# Patient Record
Sex: MALE | Race: WHITE | Employment: OTHER | URBAN - NONMETROPOLITAN AREA
[De-identification: names, ages, dates, MRNs, and addresses within clinical notes are randomized per-mention and may not be internally consistent; named-entity substitution may affect disease eponyms.]

---

## 2017-01-09 ENCOUNTER — OFFICE VISIT (OUTPATIENT)
Dept: FAMILY MEDICINE CLINIC | Age: 67
End: 2017-01-09

## 2017-01-09 VITALS
TEMPERATURE: 98.7 F | HEART RATE: 88 BPM | BODY MASS INDEX: 26.76 KG/M2 | HEIGHT: 72 IN | SYSTOLIC BLOOD PRESSURE: 132 MMHG | WEIGHT: 197.6 LBS | DIASTOLIC BLOOD PRESSURE: 72 MMHG

## 2017-01-09 DIAGNOSIS — J18.9 PNEUMONIA OF LEFT LOWER LOBE DUE TO INFECTIOUS ORGANISM: Primary | ICD-10-CM

## 2017-01-09 PROCEDURE — 99213 OFFICE O/P EST LOW 20 MIN: CPT | Performed by: FAMILY MEDICINE

## 2017-01-09 RX ORDER — CEFDINIR 300 MG/1
300 CAPSULE ORAL 2 TIMES DAILY
Qty: 20 CAPSULE | Refills: 0 | Status: SHIPPED | OUTPATIENT
Start: 2017-01-09 | End: 2017-01-19

## 2017-01-23 ENCOUNTER — OFFICE VISIT (OUTPATIENT)
Dept: FAMILY MEDICINE CLINIC | Age: 67
End: 2017-01-23

## 2017-01-23 VITALS
WEIGHT: 197.4 LBS | SYSTOLIC BLOOD PRESSURE: 128 MMHG | BODY MASS INDEX: 26.74 KG/M2 | TEMPERATURE: 97.9 F | DIASTOLIC BLOOD PRESSURE: 70 MMHG | HEART RATE: 88 BPM | HEIGHT: 72 IN

## 2017-01-23 DIAGNOSIS — J01.90 ACUTE NON-RECURRENT SINUSITIS, UNSPECIFIED LOCATION: Primary | ICD-10-CM

## 2017-01-23 PROCEDURE — 4004F PT TOBACCO SCREEN RCVD TLK: CPT | Performed by: FAMILY MEDICINE

## 2017-01-23 PROCEDURE — G8484 FLU IMMUNIZE NO ADMIN: HCPCS | Performed by: FAMILY MEDICINE

## 2017-01-23 PROCEDURE — 99212 OFFICE O/P EST SF 10 MIN: CPT | Performed by: FAMILY MEDICINE

## 2017-01-23 PROCEDURE — 1123F ACP DISCUSS/DSCN MKR DOCD: CPT | Performed by: FAMILY MEDICINE

## 2017-01-23 PROCEDURE — 3017F COLORECTAL CA SCREEN DOC REV: CPT | Performed by: FAMILY MEDICINE

## 2017-01-23 PROCEDURE — 4040F PNEUMOC VAC/ADMIN/RCVD: CPT | Performed by: FAMILY MEDICINE

## 2017-01-23 PROCEDURE — G8427 DOCREV CUR MEDS BY ELIG CLIN: HCPCS | Performed by: FAMILY MEDICINE

## 2017-01-23 PROCEDURE — G8420 CALC BMI NORM PARAMETERS: HCPCS | Performed by: FAMILY MEDICINE

## 2017-01-23 RX ORDER — AMOXICILLIN AND CLAVULANATE POTASSIUM 500; 125 MG/1; MG/1
1 TABLET, FILM COATED ORAL 2 TIMES DAILY
Qty: 20 TABLET | Refills: 0 | Status: SHIPPED | OUTPATIENT
Start: 2017-01-23 | End: 2017-02-02

## 2017-01-23 RX ORDER — PREDNISONE 10 MG/1
10 TABLET ORAL 2 TIMES DAILY
Qty: 14 TABLET | Refills: 0 | Status: SHIPPED | OUTPATIENT
Start: 2017-01-23 | End: 2017-03-06 | Stop reason: ALTCHOICE

## 2017-02-02 ENCOUNTER — TELEPHONE (OUTPATIENT)
Dept: FAMILY MEDICINE CLINIC | Age: 67
End: 2017-02-02

## 2017-02-02 DIAGNOSIS — J20.9 ACUTE BRONCHITIS, UNSPECIFIED ORGANISM: Primary | ICD-10-CM

## 2017-02-06 ENCOUNTER — TELEPHONE (OUTPATIENT)
Dept: FAMILY MEDICINE CLINIC | Age: 67
End: 2017-02-06

## 2017-02-13 ENCOUNTER — TELEPHONE (OUTPATIENT)
Dept: FAMILY MEDICINE CLINIC | Age: 67
End: 2017-02-13

## 2017-02-13 RX ORDER — LEVOFLOXACIN 500 MG/1
500 TABLET, FILM COATED ORAL DAILY
Qty: 7 TABLET | Refills: 0 | Status: SHIPPED | OUTPATIENT
Start: 2017-02-13 | End: 2017-02-20

## 2017-02-13 RX ORDER — PROMETHAZINE HYDROCHLORIDE AND CODEINE PHOSPHATE 6.25; 1 MG/5ML; MG/5ML
SYRUP ORAL
Qty: 120 ML | Refills: 1 | Status: SHIPPED | OUTPATIENT
Start: 2017-02-13 | End: 2017-03-06 | Stop reason: ALTCHOICE

## 2017-03-06 ENCOUNTER — OFFICE VISIT (OUTPATIENT)
Dept: FAMILY MEDICINE CLINIC | Age: 67
End: 2017-03-06

## 2017-03-06 VITALS
WEIGHT: 200.2 LBS | SYSTOLIC BLOOD PRESSURE: 122 MMHG | TEMPERATURE: 97.8 F | HEART RATE: 88 BPM | DIASTOLIC BLOOD PRESSURE: 72 MMHG | BODY MASS INDEX: 27.15 KG/M2

## 2017-03-06 DIAGNOSIS — J01.91 ACUTE RECURRENT SINUSITIS, UNSPECIFIED LOCATION: Primary | ICD-10-CM

## 2017-03-06 PROCEDURE — 99213 OFFICE O/P EST LOW 20 MIN: CPT | Performed by: FAMILY MEDICINE

## 2017-03-06 PROCEDURE — G8484 FLU IMMUNIZE NO ADMIN: HCPCS | Performed by: FAMILY MEDICINE

## 2017-03-06 PROCEDURE — G8420 CALC BMI NORM PARAMETERS: HCPCS | Performed by: FAMILY MEDICINE

## 2017-03-06 PROCEDURE — 1123F ACP DISCUSS/DSCN MKR DOCD: CPT | Performed by: FAMILY MEDICINE

## 2017-03-06 PROCEDURE — 1036F TOBACCO NON-USER: CPT | Performed by: FAMILY MEDICINE

## 2017-03-06 PROCEDURE — 3017F COLORECTAL CA SCREEN DOC REV: CPT | Performed by: FAMILY MEDICINE

## 2017-03-06 PROCEDURE — G8427 DOCREV CUR MEDS BY ELIG CLIN: HCPCS | Performed by: FAMILY MEDICINE

## 2017-03-06 PROCEDURE — 4040F PNEUMOC VAC/ADMIN/RCVD: CPT | Performed by: FAMILY MEDICINE

## 2017-03-06 RX ORDER — TRIAMCINOLONE ACETONIDE 55 UG/1
2 SPRAY, METERED NASAL DAILY
Qty: 1 INHALER | Refills: 3 | Status: SHIPPED | OUTPATIENT
Start: 2017-03-06 | End: 2019-05-21

## 2017-03-06 RX ORDER — SELENIUM SULFIDE 2.5 MG/100ML
LOTION TOPICAL
Qty: 1 BOTTLE | Refills: 2 | Status: SHIPPED | OUTPATIENT
Start: 2017-03-06 | End: 2017-04-05

## 2017-05-23 ENCOUNTER — OFFICE VISIT (OUTPATIENT)
Dept: FAMILY MEDICINE CLINIC | Age: 67
End: 2017-05-23

## 2017-05-23 VITALS
HEIGHT: 72 IN | DIASTOLIC BLOOD PRESSURE: 82 MMHG | BODY MASS INDEX: 27.74 KG/M2 | SYSTOLIC BLOOD PRESSURE: 144 MMHG | HEART RATE: 88 BPM | WEIGHT: 204.8 LBS

## 2017-05-23 DIAGNOSIS — I10 ESSENTIAL HYPERTENSION: Primary | ICD-10-CM

## 2017-05-23 DIAGNOSIS — Z23 NEED FOR PROPHYLACTIC VACCINATION AGAINST STREPTOCOCCUS PNEUMONIAE (PNEUMOCOCCUS): ICD-10-CM

## 2017-05-23 DIAGNOSIS — N52.31 ERECTILE DYSFUNCTION FOLLOWING RADICAL PROSTATECTOMY: ICD-10-CM

## 2017-05-23 PROCEDURE — 90670 PCV13 VACCINE IM: CPT | Performed by: FAMILY MEDICINE

## 2017-05-23 PROCEDURE — 99213 OFFICE O/P EST LOW 20 MIN: CPT | Performed by: FAMILY MEDICINE

## 2017-05-23 PROCEDURE — 1123F ACP DISCUSS/DSCN MKR DOCD: CPT | Performed by: FAMILY MEDICINE

## 2017-05-23 PROCEDURE — G8420 CALC BMI NORM PARAMETERS: HCPCS | Performed by: FAMILY MEDICINE

## 2017-05-23 PROCEDURE — G0009 ADMIN PNEUMOCOCCAL VACCINE: HCPCS | Performed by: FAMILY MEDICINE

## 2017-05-23 PROCEDURE — 1036F TOBACCO NON-USER: CPT | Performed by: FAMILY MEDICINE

## 2017-05-23 PROCEDURE — 3017F COLORECTAL CA SCREEN DOC REV: CPT | Performed by: FAMILY MEDICINE

## 2017-05-23 PROCEDURE — 4040F PNEUMOC VAC/ADMIN/RCVD: CPT | Performed by: FAMILY MEDICINE

## 2017-05-23 PROCEDURE — G8427 DOCREV CUR MEDS BY ELIG CLIN: HCPCS | Performed by: FAMILY MEDICINE

## 2017-05-23 RX ORDER — LORAZEPAM 1 MG/1
1 TABLET ORAL EVERY 8 HOURS PRN
Qty: 45 TABLET | Refills: 1 | Status: SHIPPED | OUTPATIENT
Start: 2017-05-23 | End: 2017-11-21 | Stop reason: SDUPTHER

## 2017-05-23 RX ORDER — SIMVASTATIN 10 MG
10 TABLET ORAL NIGHTLY
Qty: 90 TABLET | Refills: 1 | Status: SHIPPED | OUTPATIENT
Start: 2017-05-23 | End: 2017-11-21 | Stop reason: SDUPTHER

## 2017-05-23 RX ORDER — TADALAFIL 20 MG
20 TABLET ORAL PRN
Qty: 5 TABLET | Refills: 3 | Status: SHIPPED | OUTPATIENT
Start: 2017-05-23 | End: 2017-11-21 | Stop reason: SDUPTHER

## 2017-05-23 RX ORDER — LISINOPRIL AND HYDROCHLOROTHIAZIDE 25; 20 MG/1; MG/1
1 TABLET ORAL DAILY
Qty: 90 TABLET | Refills: 1 | Status: SHIPPED | OUTPATIENT
Start: 2017-05-23 | End: 2017-11-21 | Stop reason: SDUPTHER

## 2017-05-23 ASSESSMENT — ENCOUNTER SYMPTOMS
RESPIRATORY NEGATIVE: 1
GASTROINTESTINAL NEGATIVE: 1
ORTHOPNEA: 0
SHORTNESS OF BREATH: 0

## 2017-11-14 ENCOUNTER — TELEPHONE (OUTPATIENT)
Dept: FAMILY MEDICINE CLINIC | Age: 67
End: 2017-11-14

## 2017-11-14 DIAGNOSIS — I10 ESSENTIAL HYPERTENSION: Primary | ICD-10-CM

## 2017-11-14 DIAGNOSIS — C61 PROSTATE CANCER (HCC): ICD-10-CM

## 2017-11-14 DIAGNOSIS — E78.00 HYPERCHOLESTEROLEMIA: ICD-10-CM

## 2017-11-15 ENCOUNTER — TELEPHONE (OUTPATIENT)
Dept: FAMILY MEDICINE CLINIC | Age: 67
End: 2017-11-15

## 2017-11-15 ENCOUNTER — NURSE ONLY (OUTPATIENT)
Dept: FAMILY MEDICINE CLINIC | Age: 67
End: 2017-11-15
Payer: MEDICARE

## 2017-11-15 DIAGNOSIS — I10 ESSENTIAL HYPERTENSION: ICD-10-CM

## 2017-11-15 DIAGNOSIS — C61 PROSTATE CANCER (HCC): ICD-10-CM

## 2017-11-15 DIAGNOSIS — E78.00 HYPERCHOLESTEROLEMIA: ICD-10-CM

## 2017-11-15 LAB
ALBUMIN SERPL-MCNC: 4.1 G/DL (ref 3.5–5.1)
ALP BLD-CCNC: 48 U/L (ref 38–126)
ALT SERPL-CCNC: 33 U/L (ref 11–66)
ANION GAP SERPL CALCULATED.3IONS-SCNC: 9 MEQ/L (ref 8–16)
AST SERPL-CCNC: 26 U/L (ref 5–40)
BILIRUB SERPL-MCNC: 0.4 MG/DL (ref 0.3–1.2)
BUN BLDV-MCNC: 23 MG/DL (ref 7–22)
CALCIUM SERPL-MCNC: 8.7 MG/DL (ref 8.5–10.5)
CHLORIDE BLD-SCNC: 104 MEQ/L (ref 98–111)
CHOLESTEROL, TOTAL: 152 MG/DL (ref 100–199)
CO2: 27 MEQ/L (ref 23–33)
CREAT SERPL-MCNC: 0.9 MG/DL (ref 0.4–1.2)
GFR SERPL CREATININE-BSD FRML MDRD: 84 ML/MIN/1.73M2
GLUCOSE BLD-MCNC: 111 MG/DL (ref 70–108)
HDLC SERPL-MCNC: 46 MG/DL
LDL CHOLESTEROL CALCULATED: 77 MG/DL
POTASSIUM SERPL-SCNC: 4.3 MEQ/L (ref 3.5–5.2)
PROSTATE SPECIFIC ANTIGEN: < 0.02 NG/ML (ref 0–1)
SODIUM BLD-SCNC: 140 MEQ/L (ref 135–145)
TOTAL PROTEIN: 6.9 G/DL (ref 6.1–8)
TRIGL SERPL-MCNC: 145 MG/DL (ref 0–199)

## 2017-11-15 PROCEDURE — 36415 COLL VENOUS BLD VENIPUNCTURE: CPT | Performed by: FAMILY MEDICINE

## 2017-11-15 NOTE — PROGRESS NOTES
Venipuncture obtained from left Arm. Patient tolerated the procedure without complications or complaints.

## 2017-11-21 ENCOUNTER — OFFICE VISIT (OUTPATIENT)
Dept: FAMILY MEDICINE CLINIC | Age: 67
End: 2017-11-21
Payer: MEDICARE

## 2017-11-21 VITALS
SYSTOLIC BLOOD PRESSURE: 130 MMHG | BODY MASS INDEX: 28.47 KG/M2 | DIASTOLIC BLOOD PRESSURE: 78 MMHG | HEIGHT: 72 IN | HEART RATE: 88 BPM | WEIGHT: 210.2 LBS

## 2017-11-21 DIAGNOSIS — Z23 NEED FOR PROPHYLACTIC VACCINATION AND INOCULATION AGAINST INFLUENZA: ICD-10-CM

## 2017-11-21 DIAGNOSIS — J01.91 ACUTE RECURRENT SINUSITIS, UNSPECIFIED LOCATION: ICD-10-CM

## 2017-11-21 DIAGNOSIS — N52.31 ERECTILE DYSFUNCTION FOLLOWING RADICAL PROSTATECTOMY: ICD-10-CM

## 2017-11-21 DIAGNOSIS — I10 ESSENTIAL HYPERTENSION: Primary | ICD-10-CM

## 2017-11-21 PROCEDURE — G8510 SCR DEP NEG, NO PLAN REQD: HCPCS | Performed by: FAMILY MEDICINE

## 2017-11-21 PROCEDURE — G0008 ADMIN INFLUENZA VIRUS VAC: HCPCS | Performed by: FAMILY MEDICINE

## 2017-11-21 PROCEDURE — 90688 IIV4 VACCINE SPLT 0.5 ML IM: CPT | Performed by: FAMILY MEDICINE

## 2017-11-21 PROCEDURE — 3017F COLORECTAL CA SCREEN DOC REV: CPT | Performed by: FAMILY MEDICINE

## 2017-11-21 PROCEDURE — 3288F FALL RISK ASSESSMENT DOCD: CPT | Performed by: FAMILY MEDICINE

## 2017-11-21 PROCEDURE — 99213 OFFICE O/P EST LOW 20 MIN: CPT | Performed by: FAMILY MEDICINE

## 2017-11-21 PROCEDURE — 4040F PNEUMOC VAC/ADMIN/RCVD: CPT | Performed by: FAMILY MEDICINE

## 2017-11-21 PROCEDURE — 1036F TOBACCO NON-USER: CPT | Performed by: FAMILY MEDICINE

## 2017-11-21 PROCEDURE — 1123F ACP DISCUSS/DSCN MKR DOCD: CPT | Performed by: FAMILY MEDICINE

## 2017-11-21 PROCEDURE — G8484 FLU IMMUNIZE NO ADMIN: HCPCS | Performed by: FAMILY MEDICINE

## 2017-11-21 PROCEDURE — G8417 CALC BMI ABV UP PARAM F/U: HCPCS | Performed by: FAMILY MEDICINE

## 2017-11-21 PROCEDURE — G8427 DOCREV CUR MEDS BY ELIG CLIN: HCPCS | Performed by: FAMILY MEDICINE

## 2017-11-21 RX ORDER — SIMVASTATIN 10 MG
10 TABLET ORAL NIGHTLY
Qty: 90 TABLET | Refills: 1 | Status: SHIPPED | OUTPATIENT
Start: 2017-11-21 | End: 2018-05-22 | Stop reason: SDUPTHER

## 2017-11-21 RX ORDER — FLUTICASONE PROPIONATE 110 UG/1
2 AEROSOL, METERED RESPIRATORY (INHALATION) 2 TIMES DAILY
Qty: 1 INHALER | Refills: 0 | Status: SHIPPED | OUTPATIENT
Start: 2017-11-21 | End: 2018-05-22

## 2017-11-21 RX ORDER — VALACYCLOVIR HYDROCHLORIDE 500 MG/1
500 TABLET, FILM COATED ORAL 3 TIMES DAILY
Qty: 21 TABLET | Refills: 1 | Status: SHIPPED | OUTPATIENT
Start: 2017-11-21 | End: 2018-05-22 | Stop reason: SDUPTHER

## 2017-11-21 RX ORDER — TADALAFIL 20 MG
20 TABLET ORAL PRN
Qty: 5 TABLET | Refills: 3 | Status: SHIPPED | OUTPATIENT
Start: 2017-11-21 | End: 2019-05-21 | Stop reason: ALTCHOICE

## 2017-11-21 RX ORDER — LORAZEPAM 1 MG/1
1 TABLET ORAL EVERY 8 HOURS PRN
Qty: 45 TABLET | Refills: 1 | Status: SHIPPED | OUTPATIENT
Start: 2017-11-21 | End: 2018-05-22 | Stop reason: SDUPTHER

## 2017-11-21 RX ORDER — LISINOPRIL AND HYDROCHLOROTHIAZIDE 25; 20 MG/1; MG/1
1 TABLET ORAL DAILY
Qty: 90 TABLET | Refills: 1 | Status: SHIPPED | OUTPATIENT
Start: 2017-11-21 | End: 2018-05-22 | Stop reason: SDUPTHER

## 2017-11-21 ASSESSMENT — PATIENT HEALTH QUESTIONNAIRE - PHQ9
SUM OF ALL RESPONSES TO PHQ QUESTIONS 1-9: 0
SUM OF ALL RESPONSES TO PHQ9 QUESTIONS 1 & 2: 0
1. LITTLE INTEREST OR PLEASURE IN DOING THINGS: 0
2. FEELING DOWN, DEPRESSED OR HOPELESS: 0

## 2017-11-21 ASSESSMENT — ENCOUNTER SYMPTOMS
GASTROINTESTINAL NEGATIVE: 1
SHORTNESS OF BREATH: 0
CHOKING: 0
COUGH: 1
ORTHOPNEA: 0
WHEEZING: 1

## 2017-11-21 NOTE — PROGRESS NOTES
[facility] PROFESSIONAL SERVS  [facility]  [address] [id number]  Dept: [phone]  Dept Fax: [phone]  Loc: [phone]    Visit Date: 11/21/2017    Renita Jose is a 78 y.o. male who presents today for:   Chief Complaint   Patient presents with    6 Month Follow-Up    Hypertension         HPI:       For months now has had phlegm and cough. No SOB or CP. Wheezes. Sleeps well. Exercise with no resp symptoms. No history of asthma. No tobacco.  Failed Serevent before. Hypertension   This is a chronic problem. The current episode started more than 1 year ago. The problem has been resolved since onset. The problem is controlled. Pertinent negatives include no chest pain, orthopnea, palpitations or shortness of breath. Risk factors for coronary artery disease include family history and male gender. Past treatments include diuretics and ACE inhibitors. The current treatment provides significant improvement. There are no compliance problems. There is no history of kidney disease, CAD/MI, CVA, PVD or a thyroid problem. There is no history of chronic renal disease. Current Outpatient Prescriptions   Medication Sig Dispense Refill    Glucosamine-Chondroit-Vit C-Mn (GLUCOSAMINE 1500 COMPLEX PO) Take 1 capsule by mouth      LORazepam (ATIVAN) 1 MG tablet Take 1 tablet by mouth every 8 hours as needed for Anxiety (sleep) .  45 tablet 1    simvastatin (ZOCOR) 10 MG tablet Take 1 tablet by mouth nightly 90 tablet 1    lisinopril-hydrochlorothiazide (PRINZIDE;ZESTORETIC) 20-25 MG per tablet Take 1 tablet by mouth daily 90 tablet 1    CIALIS 20 MG tablet Take 1 tablet by mouth as needed for Erectile Dysfunction 1/2 to 1 tab prn 5 tablet 3    valACYclovir (VALTREX) 500 MG tablet Take 1 tablet by mouth 3 times daily 21 tablet 1    fluticasone (FLOVENT HFA) 110 MCG/ACT inhaler Inhale 2 puffs into the lungs 2 times daily 1 Inhaler 0    triamcinolone (NASACORT ALLERGY 24HR) 55 MCG/ACT nasal inhaler 2 sprays by Nasal route daily 1 Inhaler 3    Probiotic Product (PROBIOTIC DAILY PO) Take by mouth daily      Ketoconazole (NIZORAL PO) Take 200 mg by mouth Take two pills one hour before sweating, once weekly 2 weeks      aspirin 81 MG EC tablet Take 81 mg by mouth daily.  Ascorbic Acid (VITAMIN C) 500 MG tablet Take 500 mg by mouth daily.  fish oil-omega-3 fatty acids 1000 MG capsule Take  by mouth daily. 1400mg         Coenzyme Q10 (CO Q 10) 100 MG CAPS Take 200 mg by mouth daily. No current facility-administered medications for this visit. The patient has No Known Allergies. Subjective:      Review of Systems   Constitutional: Negative. Negative for activity change and appetite change. HENT: Negative. Respiratory: Positive for cough and wheezing. Negative for choking and shortness of breath. Cardiovascular: Negative. Negative for chest pain, palpitations and orthopnea. Gastrointestinal: Negative. Endocrine: Negative. Genitourinary: Negative. Musculoskeletal: Negative. Skin: Negative. Neurological: Negative. Hematological: Negative. Psychiatric/Behavioral: Negative. Negative for dysphoric mood. Objective:     /78 (Site: Left Arm, Position: Sitting, Cuff Size: Large Adult)   Pulse 88   Ht 6' (1.829 m)   Wt 210 lb 3.2 oz (95.3 kg)   BMI 28.51 kg/m²     Physical Exam   Constitutional: He is oriented to person, place, and time. He appears well-developed and well-nourished. Neck: Normal range of motion. Neck supple. No thyromegaly present. Cardiovascular: Normal rate, regular rhythm and normal heart sounds. Exam reveals no gallop and no friction rub. No murmur heard. Pulmonary/Chest: Effort normal and breath sounds normal.   Clear deep but at times a wheeze outside. Peak flow down 10%  500  (550). Abdominal: Soft. He exhibits no mass.  There is no splenomegaly or at 12:24 PM

## 2018-01-17 ENCOUNTER — TELEPHONE (OUTPATIENT)
Dept: FAMILY MEDICINE CLINIC | Age: 68
End: 2018-01-17

## 2018-01-17 NOTE — TELEPHONE ENCOUNTER
Left message for Asifferparticia Chatfield to call us with a date for any past colonoscopy he may have gotten and where so we can get a report.

## 2018-05-22 ENCOUNTER — OFFICE VISIT (OUTPATIENT)
Dept: FAMILY MEDICINE CLINIC | Age: 68
End: 2018-05-22
Payer: MEDICARE

## 2018-05-22 VITALS
BODY MASS INDEX: 28.12 KG/M2 | HEART RATE: 84 BPM | DIASTOLIC BLOOD PRESSURE: 88 MMHG | WEIGHT: 207.6 LBS | SYSTOLIC BLOOD PRESSURE: 136 MMHG | HEIGHT: 72 IN

## 2018-05-22 DIAGNOSIS — N52.31 ERECTILE DYSFUNCTION FOLLOWING RADICAL PROSTATECTOMY: ICD-10-CM

## 2018-05-22 DIAGNOSIS — E78.00 HYPERCHOLESTEROLEMIA: Primary | ICD-10-CM

## 2018-05-22 DIAGNOSIS — I10 ESSENTIAL HYPERTENSION: ICD-10-CM

## 2018-05-22 PROCEDURE — G8427 DOCREV CUR MEDS BY ELIG CLIN: HCPCS | Performed by: FAMILY MEDICINE

## 2018-05-22 PROCEDURE — 4040F PNEUMOC VAC/ADMIN/RCVD: CPT | Performed by: FAMILY MEDICINE

## 2018-05-22 PROCEDURE — 3017F COLORECTAL CA SCREEN DOC REV: CPT | Performed by: FAMILY MEDICINE

## 2018-05-22 PROCEDURE — 1123F ACP DISCUSS/DSCN MKR DOCD: CPT | Performed by: FAMILY MEDICINE

## 2018-05-22 PROCEDURE — 99213 OFFICE O/P EST LOW 20 MIN: CPT | Performed by: FAMILY MEDICINE

## 2018-05-22 PROCEDURE — G8417 CALC BMI ABV UP PARAM F/U: HCPCS | Performed by: FAMILY MEDICINE

## 2018-05-22 PROCEDURE — 1036F TOBACCO NON-USER: CPT | Performed by: FAMILY MEDICINE

## 2018-05-22 RX ORDER — TADALAFIL 20 MG
20 TABLET ORAL PRN
Qty: 5 TABLET | Refills: 3 | Status: SHIPPED | OUTPATIENT
Start: 2018-05-22 | End: 2018-11-20 | Stop reason: SDUPTHER

## 2018-05-22 RX ORDER — LORAZEPAM 1 MG/1
1 TABLET ORAL EVERY 8 HOURS PRN
Qty: 45 TABLET | Refills: 1 | Status: SHIPPED | OUTPATIENT
Start: 2018-05-22 | End: 2018-12-03 | Stop reason: SDUPTHER

## 2018-05-22 RX ORDER — LISINOPRIL AND HYDROCHLOROTHIAZIDE 25; 20 MG/1; MG/1
1 TABLET ORAL DAILY
Qty: 90 TABLET | Refills: 1 | Status: SHIPPED | OUTPATIENT
Start: 2018-05-22 | End: 2018-11-20 | Stop reason: SDUPTHER

## 2018-05-22 RX ORDER — VALACYCLOVIR HYDROCHLORIDE 500 MG/1
500 TABLET, FILM COATED ORAL 3 TIMES DAILY
Qty: 21 TABLET | Refills: 1 | Status: SHIPPED | OUTPATIENT
Start: 2018-05-22 | End: 2018-11-20 | Stop reason: SDUPTHER

## 2018-05-22 RX ORDER — SIMVASTATIN 10 MG
10 TABLET ORAL NIGHTLY
Qty: 90 TABLET | Refills: 1 | Status: SHIPPED | OUTPATIENT
Start: 2018-05-22 | End: 2018-11-20 | Stop reason: SDUPTHER

## 2018-05-22 ASSESSMENT — ENCOUNTER SYMPTOMS
GASTROINTESTINAL NEGATIVE: 1
ORTHOPNEA: 0
RESPIRATORY NEGATIVE: 1

## 2018-11-09 ENCOUNTER — TELEPHONE (OUTPATIENT)
Dept: FAMILY MEDICINE CLINIC | Age: 68
End: 2018-11-09

## 2018-11-09 DIAGNOSIS — I10 ESSENTIAL HYPERTENSION: Primary | ICD-10-CM

## 2018-11-09 DIAGNOSIS — C61 PROSTATE CANCER (HCC): ICD-10-CM

## 2018-11-09 DIAGNOSIS — E78.00 HYPERCHOLESTEROLEMIA: ICD-10-CM

## 2018-11-12 ENCOUNTER — NURSE ONLY (OUTPATIENT)
Dept: FAMILY MEDICINE CLINIC | Age: 68
End: 2018-11-12
Payer: MEDICARE

## 2018-11-12 ENCOUNTER — TELEPHONE (OUTPATIENT)
Dept: FAMILY MEDICINE CLINIC | Age: 68
End: 2018-11-12

## 2018-11-12 DIAGNOSIS — E78.00 HYPERCHOLESTEROLEMIA: ICD-10-CM

## 2018-11-12 DIAGNOSIS — C61 PROSTATE CANCER (HCC): ICD-10-CM

## 2018-11-12 DIAGNOSIS — I10 ESSENTIAL HYPERTENSION: ICD-10-CM

## 2018-11-12 LAB
ALBUMIN SERPL-MCNC: 4.2 G/DL (ref 3.5–5.1)
ALP BLD-CCNC: 49 U/L (ref 38–126)
ALT SERPL-CCNC: 31 U/L (ref 11–66)
ANION GAP SERPL CALCULATED.3IONS-SCNC: 13 MEQ/L (ref 8–16)
AST SERPL-CCNC: 28 U/L (ref 5–40)
BILIRUB SERPL-MCNC: 0.5 MG/DL (ref 0.3–1.2)
BUN BLDV-MCNC: 20 MG/DL (ref 7–22)
CALCIUM SERPL-MCNC: 9.7 MG/DL (ref 8.5–10.5)
CHLORIDE BLD-SCNC: 99 MEQ/L (ref 98–111)
CHOLESTEROL, TOTAL: 158 MG/DL (ref 100–199)
CO2: 26 MEQ/L (ref 23–33)
CREAT SERPL-MCNC: 1 MG/DL (ref 0.4–1.2)
GFR SERPL CREATININE-BSD FRML MDRD: 74 ML/MIN/1.73M2
GLUCOSE BLD-MCNC: 120 MG/DL (ref 70–108)
HDLC SERPL-MCNC: 46 MG/DL
LDL CHOLESTEROL CALCULATED: 81 MG/DL
POTASSIUM SERPL-SCNC: 4.3 MEQ/L (ref 3.5–5.2)
PROSTATE SPECIFIC ANTIGEN: < 0.02 NG/ML (ref 0–1)
SODIUM BLD-SCNC: 138 MEQ/L (ref 135–145)
TOTAL PROTEIN: 6.9 G/DL (ref 6.1–8)
TRIGL SERPL-MCNC: 155 MG/DL (ref 0–199)

## 2018-11-12 PROCEDURE — 36415 COLL VENOUS BLD VENIPUNCTURE: CPT | Performed by: FAMILY MEDICINE

## 2018-11-12 NOTE — TELEPHONE ENCOUNTER
----- Message from Giovani Van MD sent at 11/12/2018  4:20 PM EST -----  PSA not detectible and sugar slightly higher.

## 2018-11-20 ENCOUNTER — OFFICE VISIT (OUTPATIENT)
Dept: FAMILY MEDICINE CLINIC | Age: 68
End: 2018-11-20
Payer: MEDICARE

## 2018-11-20 VITALS
DIASTOLIC BLOOD PRESSURE: 78 MMHG | HEART RATE: 98 BPM | WEIGHT: 202.8 LBS | SYSTOLIC BLOOD PRESSURE: 130 MMHG | HEIGHT: 72 IN | BODY MASS INDEX: 27.47 KG/M2

## 2018-11-20 DIAGNOSIS — E78.00 HYPERCHOLESTEROLEMIA: ICD-10-CM

## 2018-11-20 DIAGNOSIS — I10 ESSENTIAL HYPERTENSION: Primary | ICD-10-CM

## 2018-11-20 DIAGNOSIS — R73.9 ELEVATED BLOOD SUGAR LEVEL: ICD-10-CM

## 2018-11-20 DIAGNOSIS — N52.31 ERECTILE DYSFUNCTION FOLLOWING RADICAL PROSTATECTOMY: ICD-10-CM

## 2018-11-20 DIAGNOSIS — Z23 NEED FOR PROPHYLACTIC VACCINATION AND INOCULATION AGAINST INFLUENZA: ICD-10-CM

## 2018-11-20 DIAGNOSIS — Z23 NEED FOR PROPHYLACTIC VACCINATION AGAINST STREPTOCOCCUS PNEUMONIAE (PNEUMOCOCCUS): ICD-10-CM

## 2018-11-20 LAB — HBA1C MFR BLD: 6.6 %

## 2018-11-20 PROCEDURE — 83036 HEMOGLOBIN GLYCOSYLATED A1C: CPT | Performed by: FAMILY MEDICINE

## 2018-11-20 PROCEDURE — 4040F PNEUMOC VAC/ADMIN/RCVD: CPT | Performed by: FAMILY MEDICINE

## 2018-11-20 PROCEDURE — 90732 PPSV23 VACC 2 YRS+ SUBQ/IM: CPT | Performed by: FAMILY MEDICINE

## 2018-11-20 PROCEDURE — G8482 FLU IMMUNIZE ORDER/ADMIN: HCPCS | Performed by: FAMILY MEDICINE

## 2018-11-20 PROCEDURE — G0008 ADMIN INFLUENZA VIRUS VAC: HCPCS | Performed by: FAMILY MEDICINE

## 2018-11-20 PROCEDURE — 1123F ACP DISCUSS/DSCN MKR DOCD: CPT | Performed by: FAMILY MEDICINE

## 2018-11-20 PROCEDURE — 1101F PT FALLS ASSESS-DOCD LE1/YR: CPT | Performed by: FAMILY MEDICINE

## 2018-11-20 PROCEDURE — G8510 SCR DEP NEG, NO PLAN REQD: HCPCS | Performed by: FAMILY MEDICINE

## 2018-11-20 PROCEDURE — 90662 IIV NO PRSV INCREASED AG IM: CPT | Performed by: FAMILY MEDICINE

## 2018-11-20 PROCEDURE — G8417 CALC BMI ABV UP PARAM F/U: HCPCS | Performed by: FAMILY MEDICINE

## 2018-11-20 PROCEDURE — 3017F COLORECTAL CA SCREEN DOC REV: CPT | Performed by: FAMILY MEDICINE

## 2018-11-20 PROCEDURE — 99213 OFFICE O/P EST LOW 20 MIN: CPT | Performed by: FAMILY MEDICINE

## 2018-11-20 PROCEDURE — 1036F TOBACCO NON-USER: CPT | Performed by: FAMILY MEDICINE

## 2018-11-20 PROCEDURE — G8427 DOCREV CUR MEDS BY ELIG CLIN: HCPCS | Performed by: FAMILY MEDICINE

## 2018-11-20 PROCEDURE — G0009 ADMIN PNEUMOCOCCAL VACCINE: HCPCS | Performed by: FAMILY MEDICINE

## 2018-11-20 RX ORDER — VALACYCLOVIR HYDROCHLORIDE 500 MG/1
500 TABLET, FILM COATED ORAL 3 TIMES DAILY
Qty: 21 TABLET | Refills: 1 | Status: SHIPPED | OUTPATIENT
Start: 2018-11-20 | End: 2020-10-01 | Stop reason: SDUPTHER

## 2018-11-20 RX ORDER — CYANOCOBALAMIN (VITAMIN B-12) 1000 MCG
1 TABLET, EXTENDED RELEASE ORAL 2 TIMES DAILY WITH MEALS
COMMUNITY
End: 2019-05-21

## 2018-11-20 RX ORDER — TADALAFIL 20 MG/1
20 TABLET ORAL PRN
Qty: 5 TABLET | Refills: 3 | Status: SHIPPED | OUTPATIENT
Start: 2018-11-20 | End: 2019-05-21 | Stop reason: ALTCHOICE

## 2018-11-20 RX ORDER — LISINOPRIL AND HYDROCHLOROTHIAZIDE 25; 20 MG/1; MG/1
1 TABLET ORAL DAILY
Qty: 90 TABLET | Refills: 1 | Status: SHIPPED | OUTPATIENT
Start: 2018-11-20 | End: 2019-05-21 | Stop reason: SDUPTHER

## 2018-11-20 RX ORDER — ALPRAZOLAM 0.25 MG/1
0.25 TABLET ORAL NIGHTLY PRN
Status: ON HOLD | COMMUNITY
End: 2020-12-06 | Stop reason: SDUPTHER

## 2018-11-20 RX ORDER — SIMVASTATIN 10 MG
10 TABLET ORAL NIGHTLY
Qty: 90 TABLET | Refills: 1 | Status: SHIPPED | OUTPATIENT
Start: 2018-11-20 | End: 2019-05-21 | Stop reason: SDUPTHER

## 2018-11-20 ASSESSMENT — ENCOUNTER SYMPTOMS
SHORTNESS OF BREATH: 0
RESPIRATORY NEGATIVE: 1
ORTHOPNEA: 0
GASTROINTESTINAL NEGATIVE: 1

## 2018-11-20 ASSESSMENT — PATIENT HEALTH QUESTIONNAIRE - PHQ9
1. LITTLE INTEREST OR PLEASURE IN DOING THINGS: 0
2. FEELING DOWN, DEPRESSED OR HOPELESS: 0
SUM OF ALL RESPONSES TO PHQ9 QUESTIONS 1 & 2: 0
SUM OF ALL RESPONSES TO PHQ QUESTIONS 1-9: 0
SUM OF ALL RESPONSES TO PHQ QUESTIONS 1-9: 0

## 2019-03-18 ENCOUNTER — OFFICE VISIT (OUTPATIENT)
Dept: FAMILY MEDICINE CLINIC | Age: 69
End: 2019-03-18
Payer: MEDICARE

## 2019-03-18 VITALS
HEART RATE: 96 BPM | SYSTOLIC BLOOD PRESSURE: 108 MMHG | DIASTOLIC BLOOD PRESSURE: 78 MMHG | BODY MASS INDEX: 28.2 KG/M2 | HEIGHT: 72 IN | WEIGHT: 208.2 LBS

## 2019-03-18 DIAGNOSIS — M10.9 ACUTE GOUT OF RIGHT ANKLE, UNSPECIFIED CAUSE: Primary | ICD-10-CM

## 2019-03-18 PROCEDURE — G8427 DOCREV CUR MEDS BY ELIG CLIN: HCPCS | Performed by: FAMILY MEDICINE

## 2019-03-18 PROCEDURE — 4040F PNEUMOC VAC/ADMIN/RCVD: CPT | Performed by: FAMILY MEDICINE

## 2019-03-18 PROCEDURE — G8417 CALC BMI ABV UP PARAM F/U: HCPCS | Performed by: FAMILY MEDICINE

## 2019-03-18 PROCEDURE — 1123F ACP DISCUSS/DSCN MKR DOCD: CPT | Performed by: FAMILY MEDICINE

## 2019-03-18 PROCEDURE — 1101F PT FALLS ASSESS-DOCD LE1/YR: CPT | Performed by: FAMILY MEDICINE

## 2019-03-18 PROCEDURE — 1036F TOBACCO NON-USER: CPT | Performed by: FAMILY MEDICINE

## 2019-03-18 PROCEDURE — 99213 OFFICE O/P EST LOW 20 MIN: CPT | Performed by: FAMILY MEDICINE

## 2019-03-18 PROCEDURE — 3017F COLORECTAL CA SCREEN DOC REV: CPT | Performed by: FAMILY MEDICINE

## 2019-03-18 PROCEDURE — G8510 SCR DEP NEG, NO PLAN REQD: HCPCS | Performed by: FAMILY MEDICINE

## 2019-03-18 PROCEDURE — G8482 FLU IMMUNIZE ORDER/ADMIN: HCPCS | Performed by: FAMILY MEDICINE

## 2019-03-18 RX ORDER — PREDNISONE 10 MG/1
10 TABLET ORAL 2 TIMES DAILY
Qty: 14 TABLET | Refills: 0 | Status: SHIPPED | OUTPATIENT
Start: 2019-03-18 | End: 2019-03-26 | Stop reason: ALTCHOICE

## 2019-03-26 ENCOUNTER — OFFICE VISIT (OUTPATIENT)
Dept: FAMILY MEDICINE CLINIC | Age: 69
End: 2019-03-26
Payer: MEDICARE

## 2019-03-26 ENCOUNTER — HOSPITAL ENCOUNTER (OUTPATIENT)
Dept: GENERAL RADIOLOGY | Age: 69
Discharge: HOME | End: 2019-03-26
Payer: MEDICARE

## 2019-03-26 ENCOUNTER — HOSPITAL ENCOUNTER (OUTPATIENT)
Age: 69
Discharge: HOME | End: 2019-03-26
Payer: MEDICARE

## 2019-03-26 VITALS
HEIGHT: 72 IN | HEART RATE: 94 BPM | DIASTOLIC BLOOD PRESSURE: 84 MMHG | SYSTOLIC BLOOD PRESSURE: 126 MMHG | BODY MASS INDEX: 28.04 KG/M2 | WEIGHT: 207 LBS

## 2019-03-26 DIAGNOSIS — M25.571 ACUTE RIGHT ANKLE PAIN: Primary | ICD-10-CM

## 2019-03-26 DIAGNOSIS — C61 PROSTATE CANCER (HCC): ICD-10-CM

## 2019-03-26 DIAGNOSIS — F41.9 ANXIETY: ICD-10-CM

## 2019-03-26 DIAGNOSIS — M25.571 ACUTE RIGHT ANKLE PAIN: ICD-10-CM

## 2019-03-26 LAB
BASOPHILS # BLD: 0.7 %
BASOPHILS ABSOLUTE: 0.1 THOU/MM3 (ref 0–0.1)
EOSINOPHIL # BLD: 0.5 %
EOSINOPHILS ABSOLUTE: 0.1 THOU/MM3 (ref 0–0.4)
ERYTHROCYTE [DISTWIDTH] IN BLOOD BY AUTOMATED COUNT: 13.3 % (ref 11.5–14.5)
ERYTHROCYTE [DISTWIDTH] IN BLOOD BY AUTOMATED COUNT: 45 FL (ref 35–45)
HCT VFR BLD CALC: 49.4 % (ref 42–52)
HEMOGLOBIN: 16.5 GM/DL (ref 14–18)
IMMATURE GRANS (ABS): 0.52 THOU/MM3 (ref 0–0.07)
IMMATURE GRANULOCYTES: 3.9 %
LYMPHOCYTES # BLD: 22.2 %
LYMPHOCYTES ABSOLUTE: 3 THOU/MM3 (ref 1–4.8)
MCH RBC QN AUTO: 30.9 PG (ref 26–33)
MCHC RBC AUTO-ENTMCNC: 33.4 GM/DL (ref 32.2–35.5)
MCV RBC AUTO: 92.5 FL (ref 80–94)
MONOCYTES # BLD: 6.3 %
MONOCYTES ABSOLUTE: 0.8 THOU/MM3 (ref 0.4–1.3)
NUCLEATED RED BLOOD CELLS: 0 /100 WBC
PLATELET # BLD: 375 THOU/MM3 (ref 130–400)
PLATELET ESTIMATE: ADEQUATE
PMV BLD AUTO: 9.9 FL (ref 9.4–12.4)
RBC # BLD: 5.34 MILL/MM3 (ref 4.7–6.1)
SCAN OF BLOOD SMEAR: NORMAL
SEDIMENTATION RATE, ERYTHROCYTE: 1 MM/HR (ref 0–10)
SEG NEUTROPHILS: 66.4 %
SEGMENTED NEUTROPHILS ABSOLUTE COUNT: 8.8 THOU/MM3 (ref 1.8–7.7)
URIC ACID: 6.8 MG/DL (ref 3.7–7)
WBC # BLD: 13.3 THOU/MM3 (ref 4.8–10.8)

## 2019-03-26 PROCEDURE — 4040F PNEUMOC VAC/ADMIN/RCVD: CPT | Performed by: FAMILY MEDICINE

## 2019-03-26 PROCEDURE — G8427 DOCREV CUR MEDS BY ELIG CLIN: HCPCS | Performed by: FAMILY MEDICINE

## 2019-03-26 PROCEDURE — 3017F COLORECTAL CA SCREEN DOC REV: CPT | Performed by: FAMILY MEDICINE

## 2019-03-26 PROCEDURE — G8417 CALC BMI ABV UP PARAM F/U: HCPCS | Performed by: FAMILY MEDICINE

## 2019-03-26 PROCEDURE — 99213 OFFICE O/P EST LOW 20 MIN: CPT | Performed by: FAMILY MEDICINE

## 2019-03-26 PROCEDURE — 73600 X-RAY EXAM OF ANKLE: CPT

## 2019-03-26 PROCEDURE — 1036F TOBACCO NON-USER: CPT | Performed by: FAMILY MEDICINE

## 2019-03-26 PROCEDURE — 1123F ACP DISCUSS/DSCN MKR DOCD: CPT | Performed by: FAMILY MEDICINE

## 2019-03-26 PROCEDURE — 1101F PT FALLS ASSESS-DOCD LE1/YR: CPT | Performed by: FAMILY MEDICINE

## 2019-03-26 PROCEDURE — G8482 FLU IMMUNIZE ORDER/ADMIN: HCPCS | Performed by: FAMILY MEDICINE

## 2019-03-26 RX ORDER — LORAZEPAM 1 MG/1
1 TABLET ORAL EVERY 8 HOURS PRN
Qty: 45 TABLET | Refills: 0 | Status: SHIPPED | OUTPATIENT
Start: 2019-03-26 | End: 2019-05-21 | Stop reason: SDUPTHER

## 2019-03-27 ENCOUNTER — TELEPHONE (OUTPATIENT)
Dept: FAMILY MEDICINE CLINIC | Age: 69
End: 2019-03-27

## 2019-05-16 ENCOUNTER — TELEPHONE (OUTPATIENT)
Dept: FAMILY MEDICINE CLINIC | Age: 69
End: 2019-05-16

## 2019-05-16 NOTE — TELEPHONE ENCOUNTER
Vanessa Burger has an appointment on 5/21 and wants to know if you want any labs drawn before. He had labs labs last Nov and some this last March. We are to call him back with the answer, and can leave a message on his voicemail.

## 2019-05-21 ENCOUNTER — OFFICE VISIT (OUTPATIENT)
Dept: FAMILY MEDICINE CLINIC | Age: 69
End: 2019-05-21
Payer: MEDICARE

## 2019-05-21 VITALS
DIASTOLIC BLOOD PRESSURE: 82 MMHG | WEIGHT: 207.4 LBS | SYSTOLIC BLOOD PRESSURE: 120 MMHG | BODY MASS INDEX: 28.09 KG/M2 | HEIGHT: 72 IN | HEART RATE: 88 BPM

## 2019-05-21 DIAGNOSIS — I10 ESSENTIAL HYPERTENSION: ICD-10-CM

## 2019-05-21 DIAGNOSIS — E78.00 HYPERCHOLESTEROLEMIA: ICD-10-CM

## 2019-05-21 DIAGNOSIS — C61 PROSTATE CANCER (HCC): ICD-10-CM

## 2019-05-21 DIAGNOSIS — F41.9 ANXIETY: ICD-10-CM

## 2019-05-21 PROCEDURE — 99213 OFFICE O/P EST LOW 20 MIN: CPT | Performed by: FAMILY MEDICINE

## 2019-05-21 PROCEDURE — G8417 CALC BMI ABV UP PARAM F/U: HCPCS | Performed by: FAMILY MEDICINE

## 2019-05-21 PROCEDURE — 1036F TOBACCO NON-USER: CPT | Performed by: FAMILY MEDICINE

## 2019-05-21 PROCEDURE — 1123F ACP DISCUSS/DSCN MKR DOCD: CPT | Performed by: FAMILY MEDICINE

## 2019-05-21 PROCEDURE — 4040F PNEUMOC VAC/ADMIN/RCVD: CPT | Performed by: FAMILY MEDICINE

## 2019-05-21 PROCEDURE — G8427 DOCREV CUR MEDS BY ELIG CLIN: HCPCS | Performed by: FAMILY MEDICINE

## 2019-05-21 PROCEDURE — 3017F COLORECTAL CA SCREEN DOC REV: CPT | Performed by: FAMILY MEDICINE

## 2019-05-21 RX ORDER — SIMVASTATIN 10 MG
10 TABLET ORAL NIGHTLY
Qty: 90 TABLET | Refills: 1 | Status: SHIPPED | OUTPATIENT
Start: 2019-05-21 | End: 2019-11-04 | Stop reason: SDUPTHER

## 2019-05-21 RX ORDER — LISINOPRIL AND HYDROCHLOROTHIAZIDE 25; 20 MG/1; MG/1
1 TABLET ORAL DAILY
Qty: 90 TABLET | Refills: 1 | Status: SHIPPED | OUTPATIENT
Start: 2019-05-21 | End: 2019-11-04 | Stop reason: SDUPTHER

## 2019-05-21 RX ORDER — VARDENAFIL HYDROCHLORIDE 20 MG/1
20 TABLET ORAL PRN
Qty: 30 TABLET | Refills: 1 | Status: SHIPPED | OUTPATIENT
Start: 2019-05-21 | End: 2019-11-04 | Stop reason: SDUPTHER

## 2019-05-21 RX ORDER — LORAZEPAM 1 MG/1
1 TABLET ORAL EVERY 8 HOURS PRN
Qty: 45 TABLET | Refills: 0 | Status: SHIPPED | OUTPATIENT
Start: 2019-05-21 | End: 2019-12-06 | Stop reason: SDUPTHER

## 2019-05-21 ASSESSMENT — ENCOUNTER SYMPTOMS
SHORTNESS OF BREATH: 0
GASTROINTESTINAL NEGATIVE: 1
ORTHOPNEA: 0
RESPIRATORY NEGATIVE: 1

## 2019-05-21 NOTE — PROGRESS NOTES
[facility] PROFESSIONAL SERVS  [facility]  [address] [facility] [address]  Dept: [phone]  Dept Fax: [phone]  Loc: [phone]    Visit Date: 5/21/2019    David Encarnacion is a 77 y.o.male who presents today for:   Chief Complaint   Patient presents with    6 Month Follow-Up    Hypertension         HPI:      Hypertension   This is a chronic problem. The problem has been resolved since onset. The problem is controlled. Pertinent negatives include no chest pain, orthopnea, palpitations or shortness of breath. Risk factors for coronary artery disease include family history and dyslipidemia. There are no compliance problems. There is no history of kidney disease, CAD/MI or CVA. There is no history of chronic renal disease or a thyroid problem. Hyperlipidemia   This is a chronic problem. The current episode started more than 1 year ago. The problem is controlled. Recent lipid tests were reviewed and are normal. He has no history of chronic renal disease, diabetes or hypothyroidism. Pertinent negatives include no chest pain, myalgias or shortness of breath. Current antihyperlipidemic treatment includes statins. The current treatment provides significant improvement of lipids. There are no compliance problems. There are no known risk factors for coronary artery disease. Current Outpatient Medications   Medication Sig Dispense Refill    vardenafil (LEVITRA) 20 MG tablet Take 1 tablet by mouth as needed for Erectile Dysfunction 30 tablet 1    simvastatin (ZOCOR) 10 MG tablet Take 1 tablet by mouth nightly 90 tablet 1    lisinopril-hydrochlorothiazide (PRINZIDE;ZESTORETIC) 20-25 MG per tablet Take 1 tablet by mouth daily 90 tablet 1    LORazepam (ATIVAN) 1 MG tablet Take 1 tablet by mouth every 8 hours as needed for Anxiety (sleep) for up to 30 days. 45 tablet 0    ALPRAZolam (XANAX) 0.25 MG tablet Take 0.25 mg by mouth nightly as needed for Sleep. Lord Ortiz  valACYclovir (VALTREX) 500 MG tablet Take 1 tablet by mouth 3 times daily 21 tablet 1    Glucosamine-Chondroit-Vit C-Mn (GLUCOSAMINE 1500 COMPLEX PO) Take 1 capsule by mouth      Probiotic Product (PROBIOTIC DAILY PO) Take by mouth daily      Ketoconazole (NIZORAL PO) Take 200 mg by mouth Take two pills one hour before sweating, once weekly 2 weeks      aspirin 81 MG EC tablet Take 81 mg by mouth daily.  Ascorbic Acid (VITAMIN C) 500 MG tablet Take 500 mg by mouth daily.  fish oil-omega-3 fatty acids 1000 MG capsule Take  by mouth daily. 1400mg         Coenzyme Q10 (CO Q 10) 100 MG CAPS Take 200 mg by mouth daily. No current facility-administered medications for this visit. The patient has No Known Allergies. Subjective:      Review of Systems   Constitutional: Negative. Negative for activity change, appetite change and unexpected weight change. HENT: Negative. Respiratory: Negative. Negative for shortness of breath. Cardiovascular: Negative. Negative for chest pain, palpitations and orthopnea. Gastrointestinal: Negative. Endocrine: Negative. Genitourinary: Negative. Musculoskeletal: Negative. Negative for myalgias. Skin: Negative. Neurological: Negative. Hematological: Negative. Psychiatric/Behavioral: Negative. Negative for dysphoric mood. Objective:     /82 (Site: Left Upper Arm, Position: Sitting, Cuff Size: Large Adult)   Pulse 88   Ht 6' (1.829 m)   Wt 207 lb 6.4 oz (94.1 kg)   BMI 28.13 kg/m²     Physical Exam   Constitutional: He is oriented to person, place, and time. He appears well-developed and well-nourished. Neck: Normal range of motion. Neck supple. No thyromegaly present. Cardiovascular: Normal rate, regular rhythm, normal heart sounds and intact distal pulses. Exam reveals no gallop and no friction rub. No murmur heard. Pulmonary/Chest: Effort normal and breath sounds normal.   Abdominal: Soft.  He exhibits no mass. There is no splenomegaly or hepatomegaly. There is no tenderness. Musculoskeletal: He exhibits no edema or tenderness. Lymphadenopathy:     He has no cervical adenopathy. Neurological: He is alert and oriented to person, place, and time. Ambulatory. No tremors. Skin: Skin is warm and dry. No rash noted. Psychiatric: He has a normal mood and affect. Vitals reviewed. Assessment:       Diagnosis Orders   1. Hypercholesterolemia  simvastatin (ZOCOR) 10 MG tablet   2. Essential hypertension  lisinopril-hydrochlorothiazide (PRINZIDE;ZESTORETIC) 20-25 MG per tablet   3. Prostate cancer (HCC)  vardenafil (LEVITRA) 20 MG tablet    LORazepam (ATIVAN) 1 MG tablet   4. Anxiety  LORazepam (ATIVAN) 1 MG tablet       Plan:       Return in about 6 months (around 11/21/2019) for HTN, check-up. Orders Placed:  No orders of the defined types were placed in this encounter. Medications Prescribed:  Orders Placed This Encounter   Medications    vardenafil (LEVITRA) 20 MG tablet     Sig: Take 1 tablet by mouth as needed for Erectile Dysfunction     Dispense:  30 tablet     Refill:  1    simvastatin (ZOCOR) 10 MG tablet     Sig: Take 1 tablet by mouth nightly     Dispense:  90 tablet     Refill:  1    lisinopril-hydrochlorothiazide (PRINZIDE;ZESTORETIC) 20-25 MG per tablet     Sig: Take 1 tablet by mouth daily     Dispense:  90 tablet     Refill:  1    LORazepam (ATIVAN) 1 MG tablet     Sig: Take 1 tablet by mouth every 8 hours as needed for Anxiety (sleep) for up to 30 days.      Dispense:  45 tablet     Refill:  0         Electronically signed by Ten Treviño 5/21/2019 at 10:42 AM

## 2019-10-18 ENCOUNTER — TELEPHONE (OUTPATIENT)
Dept: FAMILY MEDICINE CLINIC | Age: 69
End: 2019-10-18

## 2019-10-18 DIAGNOSIS — R73.9 ELEVATED BLOOD SUGAR LEVEL: ICD-10-CM

## 2019-10-18 DIAGNOSIS — I10 ESSENTIAL HYPERTENSION: ICD-10-CM

## 2019-10-18 DIAGNOSIS — E78.00 HYPERCHOLESTEROLEMIA: Primary | ICD-10-CM

## 2019-10-31 ENCOUNTER — NURSE ONLY (OUTPATIENT)
Dept: FAMILY MEDICINE CLINIC | Age: 69
End: 2019-10-31
Payer: MEDICARE

## 2019-10-31 DIAGNOSIS — E78.00 HYPERCHOLESTEROLEMIA: ICD-10-CM

## 2019-10-31 DIAGNOSIS — R73.9 ELEVATED BLOOD SUGAR LEVEL: ICD-10-CM

## 2019-10-31 DIAGNOSIS — I10 ESSENTIAL HYPERTENSION: ICD-10-CM

## 2019-10-31 LAB
ALBUMIN SERPL-MCNC: 4.3 G/DL (ref 3.5–5.1)
ALP BLD-CCNC: 54 U/L (ref 38–126)
ALT SERPL-CCNC: 32 U/L (ref 11–66)
ANION GAP SERPL CALCULATED.3IONS-SCNC: 14 MEQ/L (ref 8–16)
AST SERPL-CCNC: 26 U/L (ref 5–40)
AVERAGE GLUCOSE: 132 MG/DL (ref 70–126)
BILIRUB SERPL-MCNC: 0.6 MG/DL (ref 0.3–1.2)
BUN BLDV-MCNC: 21 MG/DL (ref 7–22)
CALCIUM SERPL-MCNC: 10 MG/DL (ref 8.5–10.5)
CHLORIDE BLD-SCNC: 96 MEQ/L (ref 98–111)
CHOLESTEROL, TOTAL: 176 MG/DL (ref 100–199)
CO2: 27 MEQ/L (ref 23–33)
CREAT SERPL-MCNC: 1 MG/DL (ref 0.4–1.2)
GFR SERPL CREATININE-BSD FRML MDRD: 74 ML/MIN/1.73M2
GLUCOSE BLD-MCNC: 126 MG/DL (ref 70–108)
HBA1C MFR BLD: 6.4 % (ref 4.4–6.4)
HDLC SERPL-MCNC: 47 MG/DL
LDL CHOLESTEROL CALCULATED: 89 MG/DL
POTASSIUM SERPL-SCNC: 4.2 MEQ/L (ref 3.5–5.2)
SODIUM BLD-SCNC: 137 MEQ/L (ref 135–145)
TOTAL PROTEIN: 7.3 G/DL (ref 6.1–8)
TRIGL SERPL-MCNC: 201 MG/DL (ref 0–199)

## 2019-10-31 PROCEDURE — 36415 COLL VENOUS BLD VENIPUNCTURE: CPT | Performed by: FAMILY MEDICINE

## 2019-11-04 ENCOUNTER — OFFICE VISIT (OUTPATIENT)
Dept: FAMILY MEDICINE CLINIC | Age: 69
End: 2019-11-04
Payer: MEDICARE

## 2019-11-04 VITALS
BODY MASS INDEX: 27.3 KG/M2 | DIASTOLIC BLOOD PRESSURE: 60 MMHG | WEIGHT: 201.6 LBS | HEIGHT: 72 IN | HEART RATE: 92 BPM | SYSTOLIC BLOOD PRESSURE: 122 MMHG

## 2019-11-04 DIAGNOSIS — I10 ESSENTIAL HYPERTENSION: Primary | ICD-10-CM

## 2019-11-04 DIAGNOSIS — E78.00 HYPERCHOLESTEROLEMIA: ICD-10-CM

## 2019-11-04 DIAGNOSIS — Z23 NEED FOR IMMUNIZATION AGAINST INFLUENZA: ICD-10-CM

## 2019-11-04 DIAGNOSIS — C61 PROSTATE CANCER (HCC): ICD-10-CM

## 2019-11-04 PROCEDURE — 4040F PNEUMOC VAC/ADMIN/RCVD: CPT | Performed by: FAMILY MEDICINE

## 2019-11-04 PROCEDURE — G8482 FLU IMMUNIZE ORDER/ADMIN: HCPCS | Performed by: FAMILY MEDICINE

## 2019-11-04 PROCEDURE — G8427 DOCREV CUR MEDS BY ELIG CLIN: HCPCS | Performed by: FAMILY MEDICINE

## 2019-11-04 PROCEDURE — 1036F TOBACCO NON-USER: CPT | Performed by: FAMILY MEDICINE

## 2019-11-04 PROCEDURE — 3017F COLORECTAL CA SCREEN DOC REV: CPT | Performed by: FAMILY MEDICINE

## 2019-11-04 PROCEDURE — G0008 ADMIN INFLUENZA VIRUS VAC: HCPCS | Performed by: FAMILY MEDICINE

## 2019-11-04 PROCEDURE — 1123F ACP DISCUSS/DSCN MKR DOCD: CPT | Performed by: FAMILY MEDICINE

## 2019-11-04 PROCEDURE — 90688 IIV4 VACCINE SPLT 0.5 ML IM: CPT | Performed by: FAMILY MEDICINE

## 2019-11-04 PROCEDURE — 99213 OFFICE O/P EST LOW 20 MIN: CPT | Performed by: FAMILY MEDICINE

## 2019-11-04 PROCEDURE — G8417 CALC BMI ABV UP PARAM F/U: HCPCS | Performed by: FAMILY MEDICINE

## 2019-11-04 RX ORDER — LISINOPRIL AND HYDROCHLOROTHIAZIDE 25; 20 MG/1; MG/1
1 TABLET ORAL DAILY
Qty: 90 TABLET | Refills: 3 | Status: SHIPPED | OUTPATIENT
Start: 2019-11-04 | End: 2020-10-01 | Stop reason: SDUPTHER

## 2019-11-04 RX ORDER — VARDENAFIL HYDROCHLORIDE 20 MG/1
20 TABLET ORAL PRN
Qty: 30 TABLET | Refills: 1 | Status: SHIPPED | OUTPATIENT
Start: 2019-11-04 | End: 2020-10-01 | Stop reason: SDUPTHER

## 2019-11-04 RX ORDER — SIMVASTATIN 10 MG
10 TABLET ORAL NIGHTLY
Qty: 90 TABLET | Refills: 3 | Status: SHIPPED | OUTPATIENT
Start: 2019-11-04 | End: 2020-10-01 | Stop reason: SDUPTHER

## 2019-11-04 ASSESSMENT — ENCOUNTER SYMPTOMS
GASTROINTESTINAL NEGATIVE: 1
SHORTNESS OF BREATH: 0
ORTHOPNEA: 0
RESPIRATORY NEGATIVE: 1

## 2019-11-25 ENCOUNTER — OFFICE VISIT (OUTPATIENT)
Dept: FAMILY MEDICINE CLINIC | Age: 69
End: 2019-11-25
Payer: MEDICARE

## 2019-11-25 VITALS
SYSTOLIC BLOOD PRESSURE: 130 MMHG | HEART RATE: 100 BPM | BODY MASS INDEX: 27.5 KG/M2 | HEIGHT: 72 IN | DIASTOLIC BLOOD PRESSURE: 64 MMHG | WEIGHT: 203 LBS

## 2019-11-25 DIAGNOSIS — Z00.00 ROUTINE GENERAL MEDICAL EXAMINATION AT A HEALTH CARE FACILITY: Primary | ICD-10-CM

## 2019-11-25 PROCEDURE — 4040F PNEUMOC VAC/ADMIN/RCVD: CPT | Performed by: FAMILY MEDICINE

## 2019-11-25 PROCEDURE — G0438 PPPS, INITIAL VISIT: HCPCS | Performed by: FAMILY MEDICINE

## 2019-11-25 PROCEDURE — G8482 FLU IMMUNIZE ORDER/ADMIN: HCPCS | Performed by: FAMILY MEDICINE

## 2019-11-25 PROCEDURE — 3017F COLORECTAL CA SCREEN DOC REV: CPT | Performed by: FAMILY MEDICINE

## 2019-11-25 PROCEDURE — 1123F ACP DISCUSS/DSCN MKR DOCD: CPT | Performed by: FAMILY MEDICINE

## 2019-11-25 ASSESSMENT — LIFESTYLE VARIABLES
HAS A RELATIVE, FRIEND, DOCTOR, OR ANOTHER HEALTH PROFESSIONAL EXPRESSED CONCERN ABOUT YOUR DRINKING OR SUGGESTED YOU CUT DOWN: 0
HOW OFTEN DURING THE LAST YEAR HAVE YOU NEEDED AN ALCOHOLIC DRINK FIRST THING IN THE MORNING TO GET YOURSELF GOING AFTER A NIGHT OF HEAVY DRINKING: 0
HOW OFTEN DURING THE LAST YEAR HAVE YOU FOUND THAT YOU WERE NOT ABLE TO STOP DRINKING ONCE YOU HAD STARTED: 0
AUDIT TOTAL SCORE: 2
HOW OFTEN DURING THE LAST YEAR HAVE YOU BEEN UNABLE TO REMEMBER WHAT HAPPENED THE NIGHT BEFORE BECAUSE YOU HAD BEEN DRINKING: 0
HOW OFTEN DURING THE LAST YEAR HAVE YOU HAD A FEELING OF GUILT OR REMORSE AFTER DRINKING: 0
HOW MANY STANDARD DRINKS CONTAINING ALCOHOL DO YOU HAVE ON A TYPICAL DAY: 0
HOW OFTEN DURING THE LAST YEAR HAVE YOU FAILED TO DO WHAT WAS NORMALLY EXPECTED FROM YOU BECAUSE OF DRINKING: 0
AUDIT-C TOTAL SCORE: 2
HOW OFTEN DO YOU HAVE A DRINK CONTAINING ALCOHOL: 2
HAVE YOU OR SOMEONE ELSE BEEN INJURED AS A RESULT OF YOUR DRINKING: 0
HOW OFTEN DO YOU HAVE SIX OR MORE DRINKS ON ONE OCCASION: 0

## 2019-11-25 ASSESSMENT — PATIENT HEALTH QUESTIONNAIRE - PHQ9
SUM OF ALL RESPONSES TO PHQ QUESTIONS 1-9: 0
SUM OF ALL RESPONSES TO PHQ QUESTIONS 1-9: 0

## 2019-11-26 ENCOUNTER — NURSE ONLY (OUTPATIENT)
Dept: FAMILY MEDICINE CLINIC | Age: 69
End: 2019-11-26
Payer: MEDICARE

## 2019-11-26 DIAGNOSIS — C61 PROSTATE CANCER (HCC): ICD-10-CM

## 2019-11-26 LAB — PROSTATE SPECIFIC ANTIGEN: < 0.02 NG/ML (ref 0–1)

## 2019-11-26 PROCEDURE — 36415 COLL VENOUS BLD VENIPUNCTURE: CPT | Performed by: FAMILY MEDICINE

## 2020-03-20 PROBLEM — Z85.46 H/O PROSTATE CANCER: Status: ACTIVE | Noted: 2020-03-20

## 2020-05-20 RX ORDER — LORAZEPAM 1 MG/1
1 TABLET ORAL EVERY 8 HOURS PRN
Qty: 45 TABLET | Refills: 0 | OUTPATIENT
Start: 2020-05-20 | End: 2020-06-19

## 2020-10-01 ENCOUNTER — OFFICE VISIT (OUTPATIENT)
Dept: FAMILY MEDICINE CLINIC | Age: 70
End: 2020-10-01
Payer: MEDICARE

## 2020-10-01 VITALS
BODY MASS INDEX: 26.41 KG/M2 | SYSTOLIC BLOOD PRESSURE: 118 MMHG | WEIGHT: 195 LBS | TEMPERATURE: 96.8 F | HEART RATE: 80 BPM | DIASTOLIC BLOOD PRESSURE: 68 MMHG | HEIGHT: 72 IN

## 2020-10-01 PROBLEM — B00.1 COLD SORE: Status: ACTIVE | Noted: 2020-10-01

## 2020-10-01 PROBLEM — N52.8 OTHER MALE ERECTILE DYSFUNCTION: Status: ACTIVE | Noted: 2020-10-01

## 2020-10-01 LAB
ALBUMIN SERPL-MCNC: 4.3 G/DL (ref 3.5–5.1)
ALP BLD-CCNC: 49 U/L (ref 38–126)
ALT SERPL-CCNC: 28 U/L (ref 11–66)
ANION GAP SERPL CALCULATED.3IONS-SCNC: 13 MEQ/L (ref 8–16)
AST SERPL-CCNC: 26 U/L (ref 5–40)
AVERAGE GLUCOSE: 135 MG/DL (ref 70–126)
BILIRUB SERPL-MCNC: 0.9 MG/DL (ref 0.3–1.2)
BUN BLDV-MCNC: 28 MG/DL (ref 7–22)
CALCIUM SERPL-MCNC: 9.7 MG/DL (ref 8.5–10.5)
CHLORIDE BLD-SCNC: 103 MEQ/L (ref 98–111)
CHOLESTEROL, TOTAL: 215 MG/DL (ref 100–199)
CO2: 22 MEQ/L (ref 23–33)
CREAT SERPL-MCNC: 0.9 MG/DL (ref 0.4–1.2)
GFR SERPL CREATININE-BSD FRML MDRD: 83 ML/MIN/1.73M2
GLUCOSE BLD-MCNC: 133 MG/DL (ref 70–108)
HBA1C MFR BLD: 6.5 % (ref 4.4–6.4)
HDLC SERPL-MCNC: 46 MG/DL
LDL CHOLESTEROL CALCULATED: 126 MG/DL
POTASSIUM SERPL-SCNC: 4.3 MEQ/L (ref 3.5–5.2)
SODIUM BLD-SCNC: 138 MEQ/L (ref 135–145)
TOTAL PROTEIN: 7.1 G/DL (ref 6.1–8)
TRIGL SERPL-MCNC: 215 MG/DL (ref 0–199)
TSH SERPL DL<=0.05 MIU/L-ACNC: 1.01 UIU/ML (ref 0.4–4.2)

## 2020-10-01 PROCEDURE — 1036F TOBACCO NON-USER: CPT | Performed by: FAMILY MEDICINE

## 2020-10-01 PROCEDURE — 4040F PNEUMOC VAC/ADMIN/RCVD: CPT | Performed by: FAMILY MEDICINE

## 2020-10-01 PROCEDURE — G8417 CALC BMI ABV UP PARAM F/U: HCPCS | Performed by: FAMILY MEDICINE

## 2020-10-01 PROCEDURE — G8427 DOCREV CUR MEDS BY ELIG CLIN: HCPCS | Performed by: FAMILY MEDICINE

## 2020-10-01 PROCEDURE — 3017F COLORECTAL CA SCREEN DOC REV: CPT | Performed by: FAMILY MEDICINE

## 2020-10-01 PROCEDURE — 99214 OFFICE O/P EST MOD 30 MIN: CPT | Performed by: FAMILY MEDICINE

## 2020-10-01 PROCEDURE — G8484 FLU IMMUNIZE NO ADMIN: HCPCS | Performed by: FAMILY MEDICINE

## 2020-10-01 PROCEDURE — 1123F ACP DISCUSS/DSCN MKR DOCD: CPT | Performed by: FAMILY MEDICINE

## 2020-10-01 RX ORDER — LISINOPRIL AND HYDROCHLOROTHIAZIDE 25; 20 MG/1; MG/1
1 TABLET ORAL DAILY
Qty: 90 TABLET | Refills: 3 | Status: SHIPPED | OUTPATIENT
Start: 2020-10-01 | End: 2021-09-29 | Stop reason: SDUPTHER

## 2020-10-01 RX ORDER — VALACYCLOVIR HYDROCHLORIDE 500 MG/1
500 TABLET, FILM COATED ORAL 3 TIMES DAILY
Qty: 21 TABLET | Refills: 5 | Status: SHIPPED | OUTPATIENT
Start: 2020-10-01 | End: 2020-12-10 | Stop reason: ALTCHOICE

## 2020-10-01 RX ORDER — VARDENAFIL HYDROCHLORIDE 20 MG/1
20 TABLET ORAL PRN
Qty: 30 TABLET | Refills: 5 | Status: ON HOLD
Start: 2020-10-01 | End: 2020-12-06 | Stop reason: HOSPADM

## 2020-10-01 RX ORDER — SIMVASTATIN 10 MG
10 TABLET ORAL NIGHTLY
Qty: 90 TABLET | Refills: 3 | Status: SHIPPED | OUTPATIENT
Start: 2020-10-01 | End: 2021-09-29 | Stop reason: SDUPTHER

## 2020-10-01 RX ORDER — ZINC SULFATE 50(220)MG
50 CAPSULE ORAL DAILY
COMMUNITY
End: 2021-05-24

## 2020-10-01 SDOH — ECONOMIC STABILITY: FOOD INSECURITY: WITHIN THE PAST 12 MONTHS, THE FOOD YOU BOUGHT JUST DIDN'T LAST AND YOU DIDN'T HAVE MONEY TO GET MORE.: NEVER TRUE

## 2020-10-01 SDOH — ECONOMIC STABILITY: INCOME INSECURITY: HOW HARD IS IT FOR YOU TO PAY FOR THE VERY BASICS LIKE FOOD, HOUSING, MEDICAL CARE, AND HEATING?: NOT HARD AT ALL

## 2020-10-01 SDOH — ECONOMIC STABILITY: FOOD INSECURITY: WITHIN THE PAST 12 MONTHS, YOU WORRIED THAT YOUR FOOD WOULD RUN OUT BEFORE YOU GOT MONEY TO BUY MORE.: NEVER TRUE

## 2020-10-01 SDOH — ECONOMIC STABILITY: TRANSPORTATION INSECURITY
IN THE PAST 12 MONTHS, HAS LACK OF TRANSPORTATION KEPT YOU FROM MEETINGS, WORK, OR FROM GETTING THINGS NEEDED FOR DAILY LIVING?: NO

## 2020-10-01 SDOH — ECONOMIC STABILITY: TRANSPORTATION INSECURITY
IN THE PAST 12 MONTHS, HAS THE LACK OF TRANSPORTATION KEPT YOU FROM MEDICAL APPOINTMENTS OR FROM GETTING MEDICATIONS?: NO

## 2020-10-01 ASSESSMENT — PATIENT HEALTH QUESTIONNAIRE - PHQ9
SUM OF ALL RESPONSES TO PHQ QUESTIONS 1-9: 2
SUM OF ALL RESPONSES TO PHQ QUESTIONS 1-9: 2
1. LITTLE INTEREST OR PLEASURE IN DOING THINGS: 1
2. FEELING DOWN, DEPRESSED OR HOPELESS: 1
SUM OF ALL RESPONSES TO PHQ9 QUESTIONS 1 & 2: 2

## 2020-10-01 ASSESSMENT — ENCOUNTER SYMPTOMS: SHORTNESS OF BREATH: 0

## 2020-10-01 NOTE — PROGRESS NOTES
[address] [place] [address] [address], [address], [place]  Phone:  [phone]  RLW:[phone]       Name: Jeri Tanner  : 1950    Chief Complaint   Patient presents with    Hypertension     check up    Hyperlipidemia       HPI:     Jeri Tanner is a 72 y.o. male who presents today for     HPI    Follow up of chronic medical conditions     HTN  -- 138/85 at the pharmacy recently   HLD -- on stating well ocntrolled  ED medicine works fine. Cold sore medication -- needs refills, works well. Neck pain, some left more than right. Off and on. Plays golf a lot. Tension muscles, grinding. No radiating pain into arms   Prefers doing stretches than medication. Anxiety -- feels managing well. Less stress. No flare up for ketoconazole in last few years. Current Outpatient Medications:     zinc sulfate (ZINCATE) 220 (50 Zn) MG capsule, Take 50 mg by mouth daily, Disp: , Rfl:     vardenafil (LEVITRA) 20 MG tablet, Take 1 tablet by mouth as needed for Erectile Dysfunction, Disp: 30 tablet, Rfl: 5    simvastatin (ZOCOR) 10 MG tablet, Take 1 tablet by mouth nightly, Disp: 90 tablet, Rfl: 3    lisinopril-hydroCHLOROthiazide (PRINZIDE;ZESTORETIC) 20-25 MG per tablet, Take 1 tablet by mouth daily, Disp: 90 tablet, Rfl: 3    valACYclovir (VALTREX) 500 MG tablet, Take 1 tablet by mouth 3 times daily, Disp: 21 tablet, Rfl: 5    Glucosamine-Chondroit-Vit C-Mn (GLUCOSAMINE 1500 COMPLEX PO), Take 1 capsule by mouth, Disp: , Rfl:     Probiotic Product (PROBIOTIC DAILY PO), Take by mouth daily, Disp: , Rfl:     Ketoconazole (NIZORAL PO), Take 200 mg by mouth Take two pills one hour before sweating, once weekly 2 weeks, Disp: , Rfl:     aspirin 81 MG EC tablet, Take 81 mg by mouth daily. , Disp: , Rfl:     Ascorbic Acid (VITAMIN C) 500 MG tablet, Take 500 mg by mouth daily. , Disp: , Rfl:     fish oil-omega-3 fatty acids 1000 MG capsule, Take  by mouth daily.  1400mg  , Disp: , Rfl:     Coenzyme Q10 (CO Q 10) 100 MG CAPS, Take 200 mg by mouth daily. , Disp: , Rfl:     ALPRAZolam (XANAX) 0.25 MG tablet, Take 0.25 mg by mouth nightly as needed for Sleep. ., Disp: , Rfl:     No Known Allergies    Review of Systems   Constitutional: Negative for fatigue and fever. Respiratory: Negative for shortness of breath. Cardiovascular: Negative for chest pain and leg swelling.   + neck pain    Objective:     /68 (Site: Right Upper Arm, Position: Sitting, Cuff Size: Medium Adult)   Pulse 80   Temp 96.8 °F (36 °C) (Temporal)   Ht 6' (1.829 m)   Wt 195 lb (88.5 kg)   BMI 26.45 kg/m²     Physical Exam  Constitutional:       General: He is not in acute distress. Appearance: Normal appearance. He is not ill-appearing. Neck:      Musculoskeletal: No muscular tenderness. Cardiovascular:      Rate and Rhythm: Normal rate and regular rhythm. Heart sounds: No murmur. Pulmonary:      Effort: Pulmonary effort is normal. No respiratory distress. Breath sounds: Normal breath sounds. No wheezing. Musculoskeletal:         General: No swelling. Neurological:      Mental Status: He is alert and oriented to person, place, and time.    Psychiatric:         Mood and Affect: Mood normal.         Lab Results   Component Value Date    LABA1C 6.4 10/31/2019     No results found for: EAG  Lab Results   Component Value Date    LABA1C 6.4 10/31/2019    LABA1C 6.6 11/20/2018       Lab Results   Component Value Date     10/31/2019    K 4.2 10/31/2019    CL 96 (L) 10/31/2019    CO2 27 10/31/2019    BUN 21 10/31/2019    CREATININE 1.0 10/31/2019    CALCIUM 10.0 10/31/2019    GLUCOSE 126 (H) 10/31/2019        Lab Results   Component Value Date    CHOL 176 10/31/2019    CHOL 158 11/12/2018    CHOL 152 11/15/2017     Lab Results   Component Value Date    TRIG 201 (H) 10/31/2019    TRIG 155 11/12/2018    TRIG 145 11/15/2017     Lab Results   Component Value Date    HDL 47 10/31/2019    HDL 46 11/12/2018    HDL 46 11/15/2017     Lab Results   Component Value Date    LDLCALC 89 10/31/2019    LDLCALC 81 11/12/2018    LDLCALC 77 11/15/2017     Lab Results   Component Value Date    LABVLDL 34 (H) 11/23/2016    LABVLDL 44 (H) 11/23/2015    LABVLDL 18 11/17/2014     Lab Results   Component Value Date    CHOLHDLRATIO 3.7 11/23/2016    CHOLHDLRATIO 4.5 11/23/2015    CHOLHDLRATIO 3.1 11/17/2014     No results found for: Orene Alpers    No results found for: TSH   No results found for: [id number]   No results found for: MG   Lab Results   Component Value Date    ALT 32 10/31/2019    AST 26 10/31/2019    ALKPHOS 54 10/31/2019    BILITOT 0.6 10/31/2019        Assessment/Plan:     Aleksandra Mills was seen today for hypertension, hyperlipidemia and labs only. Diagnoses and all orders for this visit:    Essential hypertension  -     lisinopril-hydroCHLOROthiazide (PRINZIDE;ZESTORETIC) 20-25 MG per tablet; Take 1 tablet by mouth daily  -     Comprehensive Metabolic Panel; Future  -     Lipid Panel; Future  -     TSH without Reflex; Future  -     Hemoglobin A1C; Future  -     Hemoglobin A1C  -     TSH without Reflex  -     Lipid Panel  -     Comprehensive Metabolic Panel    Prediabetes  -     Comprehensive Metabolic Panel; Future  -     Lipid Panel; Future  -     TSH without Reflex; Future  -     Hemoglobin A1C; Future  -     Hemoglobin A1C  -     TSH without Reflex  -     Lipid Panel  -     Comprehensive Metabolic Panel    Mixed hypercholesterolemia and hypertriglyceridemia  -     simvastatin (ZOCOR) 10 MG tablet; Take 1 tablet by mouth nightly  -     Comprehensive Metabolic Panel; Future  -     Lipid Panel; Future  -     TSH without Reflex; Future  -     Hemoglobin A1C; Future  -     Hemoglobin A1C  -     TSH without Reflex  -     Lipid Panel  -     Comprehensive Metabolic Panel    Other male erectile dysfunction  -     vardenafil (LEVITRA) 20 MG tablet;  Take 1 tablet by mouth as needed for Erectile Dysfunction  - Comprehensive Metabolic Panel; Future  -     Lipid Panel; Future  -     TSH without Reflex; Future  -     Hemoglobin A1C; Future  -     Hemoglobin A1C  -     TSH without Reflex  -     Lipid Panel  -     Comprehensive Metabolic Panel    Cold sore  -     valACYclovir (VALTREX) 500 MG tablet; Take 1 tablet by mouth 3 times daily    Neck pain    Other orders  -     Anion Gap  -     Glomerular Filtration Rate, Estimated      Continue current medication  Neck stretches given to improve tone/flexibility of neck and upper back; also encouraged working on all core muscles. Consider xray if persisting/worsening. Return in about 6 months (around 4/1/2021). No future appointments. This office note has been dictated. Effort was made to review for errors but some may have been missed. Please contact Alysia Devlin of note for clarification if needed.        Electronically signed by Catracho Valdez MD on 10/1/2020 at 9:46 AM

## 2020-10-01 NOTE — PATIENT INSTRUCTIONS
Check out \"Classical Stretch\" by [employer]. Technique of [employer] to gently stretch muscle and strengthen them. Aim to stretch several 5 -10 minutes daily if you have chronic joint trouble. For the next level of health, aim for 30 minutes of stretching and aerobic work out 3 times a week. She has website, streaming options and youtube videos. Www.[employer]/videos/     Here are mini-workout examples: -- there are many others:     For neck and upper back pain,     [employer] mini workout for upper body pain relief (under 5 min.)  [email or web address]    [employer] Workout - Shoulder, Upper Back & Pectoral Stretch (under 5 min.)  [email or web address]    [employer] Workout - Upper Back and Shoulder Pain-Relief Exercises (10 min.)  Sc[employer] [email or web address].br

## 2020-10-02 ENCOUNTER — PATIENT MESSAGE (OUTPATIENT)
Dept: FAMILY MEDICINE CLINIC | Age: 70
End: 2020-10-02

## 2020-10-02 NOTE — TELEPHONE ENCOUNTER
From: Miguel Bruce  To: Shaq Vaca MD  Sent: 10/2/2020 2:10 PM EDT  Subject: Test Results Question    i just read my test results (anion gap and TSH)from 10/1/20. Could you please explain exactly what they mean?

## 2020-10-03 ENCOUNTER — PATIENT MESSAGE (OUTPATIENT)
Dept: FAMILY MEDICINE CLINIC | Age: 70
End: 2020-10-03

## 2020-10-05 NOTE — TELEPHONE ENCOUNTER
From: Jose Luis Bruce  To: Bruce Sever, MD  Sent: 10/3/2020 10:21 PM EDT  Subject: Visit Follow-Up Question    Did the blood draw include a psa test? Its been about a year since my last and i usually have one every year. Do I need to schedule that? ?

## 2020-12-02 ENCOUNTER — APPOINTMENT (OUTPATIENT)
Dept: GENERAL RADIOLOGY | Age: 70
DRG: 854 | End: 2020-12-02
Payer: MEDICARE

## 2020-12-02 ENCOUNTER — APPOINTMENT (OUTPATIENT)
Dept: ULTRASOUND IMAGING | Age: 70
DRG: 854 | End: 2020-12-02
Payer: MEDICARE

## 2020-12-02 ENCOUNTER — APPOINTMENT (OUTPATIENT)
Dept: CT IMAGING | Age: 70
DRG: 854 | End: 2020-12-02
Payer: MEDICARE

## 2020-12-02 ENCOUNTER — HOSPITAL ENCOUNTER (INPATIENT)
Age: 70
LOS: 4 days | Discharge: HOME | DRG: 854 | End: 2020-12-06
Attending: INTERNAL MEDICINE | Admitting: INTERNAL MEDICINE
Payer: MEDICARE

## 2020-12-02 PROBLEM — A41.9 SEPSIS (HCC): Status: ACTIVE | Noted: 2020-12-02

## 2020-12-02 LAB
BASOPHILS # BLD: 0.1 %
BASOPHILS ABSOLUTE: 0 THOU/MM3 (ref 0–0.1)
BILIRUBIN URINE: NEGATIVE
BLOOD, URINE: ABNORMAL
BUN, WHOLE BLOOD: 27 MG/DL (ref 8–26)
CHARACTER, URINE: CLEAR
CHLORIDE, WHOLE BLOOD: 101 MEQ/L (ref 98–109)
COLOR: ABNORMAL
CREAT SERPL-MCNC: 0.9 MG/DL (ref 0.5–1.2)
EKG ATRIAL RATE: 113 BPM
EKG P AXIS: 54 DEGREES
EKG P-R INTERVAL: 158 MS
EKG Q-T INTERVAL: 346 MS
EKG QRS DURATION: 92 MS
EKG QTC CALCULATION (BAZETT): 474 MS
EKG R AXIS: -63 DEGREES
EKG T AXIS: 77 DEGREES
EKG VENTRICULAR RATE: 113 BPM
EOSINOPHIL # BLD: 0 %
EOSINOPHILS ABSOLUTE: 0 THOU/MM3 (ref 0–0.4)
GFR, ESTIMATED: 89 ML/MIN/1.73M2
GLUCOSE URINE: NEGATIVE MG/DL
GLUCOSE, WHOLE BLOOD: 184 MG/DL (ref 70–108)
HCT VFR BLD CALC: 45 % (ref 42–52)
HEMOGLOBIN: 15.3 GM/DL (ref 14–18)
IMMATURE GRANS (ABS): 0.18 THOU/MM3 (ref 0–0.07)
IMMATURE GRANULOCYTES: 0.8 %
KETONES, URINE: NEGATIVE
LACTIC ACID, SEPSIS: 1.6 MMOL/L (ref 0.5–1.9)
LACTIC ACID, SEPSIS: 2.5 MMOL/L (ref 0.5–1.9)
LEUKOCYTE ESTERASE, URINE: NEGATIVE
LYMPHOCYTES # BLD: 3.4 %
LYMPHOCYTES ABSOLUTE: 0.7 THOU/MM3 (ref 1–4.8)
MCH RBC QN AUTO: 31.4 PG (ref 27–31)
MCHC RBC AUTO-ENTMCNC: 34 GM/DL (ref 33–37)
MCV RBC AUTO: 92 FL (ref 80–94)
MONOCYTES # BLD: 4.8 %
MONOCYTES ABSOLUTE: 1 THOU/MM3 (ref 0.4–1.3)
NITRITE, URINE: NEGATIVE
NUCLEATED RED BLOOD CELLS: 0 /100 WBC
PDW BLD-RTO: 13.3 % (ref 11.5–14.5)
PH UA: 5.5 (ref 5–9)
PLATELET # BLD: 330 THOU/MM3 (ref 130–400)
PMV BLD AUTO: 10.3 FL (ref 7.4–10.4)
POTASSIUM, WHOLE BLOOD: 4.1 MEQ/L (ref 3.5–4.9)
PROCALCITONIN: 0.41 NG/ML (ref 0.01–0.09)
PROTEIN UA: >= 300 MG/DL
RBC # BLD: 4.87 MILL/MM3 (ref 4.7–6.1)
SARS-COV-2, NAAT: NOT DETECTED
SEG NEUTROPHILS: 90.9 %
SEGMENTED NEUTROPHILS ABSOLUTE COUNT: 19.4 THOU/MM3 (ref 1.8–7.7)
SODIUM BLD-SCNC: 134 MEQ/L (ref 138–146)
SPECIFIC GRAVITY, URINE: >= 1.03 (ref 1–1.03)
TOTAL CO2, WHOLE BLOOD: 26 MEQ/L (ref 23–33)
UROBILINOGEN, URINE: 0.2 EU/DL (ref 0.2–1)
WBC # BLD: 21.3 THOU/MM3 (ref 4.8–10.8)

## 2020-12-02 PROCEDURE — 93010 ELECTROCARDIOGRAM REPORT: CPT | Performed by: INTERNAL MEDICINE

## 2020-12-02 PROCEDURE — 84520 ASSAY OF UREA NITROGEN: CPT

## 2020-12-02 PROCEDURE — 93005 ELECTROCARDIOGRAM TRACING: CPT | Performed by: PHYSICIAN ASSISTANT

## 2020-12-02 PROCEDURE — 6360000002 HC RX W HCPCS: Performed by: INTERNAL MEDICINE

## 2020-12-02 PROCEDURE — 2580000003 HC RX 258: Performed by: PHYSICIAN ASSISTANT

## 2020-12-02 PROCEDURE — 6360000004 HC RX CONTRAST MEDICATION: Performed by: PHYSICIAN ASSISTANT

## 2020-12-02 PROCEDURE — 76870 US EXAM SCROTUM: CPT

## 2020-12-02 PROCEDURE — 82947 ASSAY GLUCOSE BLOOD QUANT: CPT

## 2020-12-02 PROCEDURE — 99215 OFFICE O/P EST HI 40 MIN: CPT

## 2020-12-02 PROCEDURE — 87077 CULTURE AEROBIC IDENTIFY: CPT

## 2020-12-02 PROCEDURE — 81003 URINALYSIS AUTO W/O SCOPE: CPT

## 2020-12-02 PROCEDURE — 36415 COLL VENOUS BLD VENIPUNCTURE: CPT

## 2020-12-02 PROCEDURE — 6360000002 HC RX W HCPCS: Performed by: PHYSICIAN ASSISTANT

## 2020-12-02 PROCEDURE — 96361 HYDRATE IV INFUSION ADD-ON: CPT

## 2020-12-02 PROCEDURE — 6370000000 HC RX 637 (ALT 250 FOR IP): Performed by: PHYSICIAN ASSISTANT

## 2020-12-02 PROCEDURE — 85025 COMPLETE CBC W/AUTO DIFF WBC: CPT

## 2020-12-02 PROCEDURE — 83605 ASSAY OF LACTIC ACID: CPT

## 2020-12-02 PROCEDURE — 71045 X-RAY EXAM CHEST 1 VIEW: CPT

## 2020-12-02 PROCEDURE — 87801 DETECT AGNT MULT DNA AMPLI: CPT

## 2020-12-02 PROCEDURE — 6370000000 HC RX 637 (ALT 250 FOR IP): Performed by: INTERNAL MEDICINE

## 2020-12-02 PROCEDURE — 87040 BLOOD CULTURE FOR BACTERIA: CPT

## 2020-12-02 PROCEDURE — 82565 ASSAY OF CREATININE: CPT

## 2020-12-02 PROCEDURE — 2580000003 HC RX 258: Performed by: INTERNAL MEDICINE

## 2020-12-02 PROCEDURE — U0002 COVID-19 LAB TEST NON-CDC: HCPCS

## 2020-12-02 PROCEDURE — 1200000003 HC TELEMETRY R&B

## 2020-12-02 PROCEDURE — 72193 CT PELVIS W/DYE: CPT

## 2020-12-02 PROCEDURE — 84145 PROCALCITONIN (PCT): CPT

## 2020-12-02 PROCEDURE — 80051 ELECTROLYTE PANEL: CPT

## 2020-12-02 PROCEDURE — 99221 1ST HOSP IP/OBS SF/LOW 40: CPT | Performed by: UROLOGY

## 2020-12-02 PROCEDURE — 96365 THER/PROPH/DIAG IV INF INIT: CPT

## 2020-12-02 PROCEDURE — 96366 THER/PROPH/DIAG IV INF ADDON: CPT

## 2020-12-02 PROCEDURE — 99285 EMERGENCY DEPT VISIT HI MDM: CPT

## 2020-12-02 PROCEDURE — 96375 TX/PRO/DX INJ NEW DRUG ADDON: CPT

## 2020-12-02 PROCEDURE — 87186 SC STD MICRODIL/AGAR DIL: CPT

## 2020-12-02 RX ORDER — LIDOCAINE 4 G/G
1 PATCH TOPICAL ONCE
Status: COMPLETED | OUTPATIENT
Start: 2020-12-02 | End: 2020-12-03

## 2020-12-02 RX ORDER — ASCORBIC ACID 500 MG
500 TABLET ORAL DAILY
Status: DISCONTINUED | OUTPATIENT
Start: 2020-12-02 | End: 2020-12-06 | Stop reason: HOSPADM

## 2020-12-02 RX ORDER — LEVOFLOXACIN 5 MG/ML
500 INJECTION, SOLUTION INTRAVENOUS EVERY 24 HOURS
Status: DISCONTINUED | OUTPATIENT
Start: 2020-12-02 | End: 2020-12-03

## 2020-12-02 RX ORDER — SODIUM CHLORIDE 9 MG/ML
INJECTION, SOLUTION INTRAVENOUS CONTINUOUS
Status: DISCONTINUED | OUTPATIENT
Start: 2020-12-02 | End: 2020-12-05

## 2020-12-02 RX ORDER — IBUPROFEN 200 MG
600 TABLET ORAL ONCE
Status: COMPLETED | OUTPATIENT
Start: 2020-12-02 | End: 2020-12-02

## 2020-12-02 RX ORDER — ALPRAZOLAM 0.5 MG/1
0.25 TABLET ORAL NIGHTLY PRN
Status: DISCONTINUED | OUTPATIENT
Start: 2020-12-02 | End: 2020-12-06 | Stop reason: HOSPADM

## 2020-12-02 RX ORDER — ACETAMINOPHEN 500 MG
1000 TABLET ORAL ONCE
Status: COMPLETED | OUTPATIENT
Start: 2020-12-02 | End: 2020-12-02

## 2020-12-02 RX ORDER — ONDANSETRON 2 MG/ML
4 INJECTION INTRAMUSCULAR; INTRAVENOUS ONCE
Status: COMPLETED | OUTPATIENT
Start: 2020-12-02 | End: 2020-12-02

## 2020-12-02 RX ORDER — 0.9 % SODIUM CHLORIDE 0.9 %
30 INTRAVENOUS SOLUTION INTRAVENOUS ONCE
Status: COMPLETED | OUTPATIENT
Start: 2020-12-02 | End: 2020-12-02

## 2020-12-02 RX ORDER — MORPHINE SULFATE 4 MG/ML
4 INJECTION, SOLUTION INTRAMUSCULAR; INTRAVENOUS ONCE
Status: COMPLETED | OUTPATIENT
Start: 2020-12-02 | End: 2020-12-02

## 2020-12-02 RX ORDER — CALCIUM CARBONATE 200(500)MG
500 TABLET,CHEWABLE ORAL 3 TIMES DAILY PRN
Status: DISCONTINUED | OUTPATIENT
Start: 2020-12-02 | End: 2020-12-06 | Stop reason: HOSPADM

## 2020-12-02 RX ORDER — ATORVASTATIN CALCIUM 10 MG/1
10 TABLET, FILM COATED ORAL NIGHTLY
Status: DISCONTINUED | OUTPATIENT
Start: 2020-12-02 | End: 2020-12-06 | Stop reason: HOSPADM

## 2020-12-02 RX ORDER — ACETAMINOPHEN 650 MG/1
650 SUPPOSITORY RECTAL EVERY 4 HOURS PRN
Status: DISCONTINUED | OUTPATIENT
Start: 2020-12-02 | End: 2020-12-06 | Stop reason: HOSPADM

## 2020-12-02 RX ORDER — LISINOPRIL 20 MG/1
20 TABLET ORAL DAILY
Status: DISCONTINUED | OUTPATIENT
Start: 2020-12-02 | End: 2020-12-04

## 2020-12-02 RX ADMIN — LEVOFLOXACIN 500 MG: 5 INJECTION, SOLUTION INTRAVENOUS at 18:56

## 2020-12-02 RX ADMIN — ATORVASTATIN CALCIUM 10 MG: 10 TABLET, FILM COATED ORAL at 22:25

## 2020-12-02 RX ADMIN — LISINOPRIL 20 MG: 20 TABLET ORAL at 18:55

## 2020-12-02 RX ADMIN — IOPAMIDOL 80 ML: 755 INJECTION, SOLUTION INTRAVENOUS at 11:02

## 2020-12-02 RX ADMIN — OXYCODONE HYDROCHLORIDE AND ACETAMINOPHEN 500 MG: 500 TABLET ORAL at 18:55

## 2020-12-02 RX ADMIN — HYDROMORPHONE HYDROCHLORIDE 0.5 MG: 1 INJECTION, SOLUTION INTRAMUSCULAR; INTRAVENOUS; SUBCUTANEOUS at 16:39

## 2020-12-02 RX ADMIN — ACETAMINOPHEN 1000 MG: 500 TABLET ORAL at 10:12

## 2020-12-02 RX ADMIN — ANTACID TABLETS 500 MG: 500 TABLET, CHEWABLE ORAL at 22:56

## 2020-12-02 RX ADMIN — ENOXAPARIN SODIUM 40 MG: 40 INJECTION SUBCUTANEOUS at 18:56

## 2020-12-02 RX ADMIN — ONDANSETRON 4 MG: 2 INJECTION INTRAMUSCULAR; INTRAVENOUS at 10:13

## 2020-12-02 RX ADMIN — HYDROMORPHONE HYDROCHLORIDE 1 MG: 1 INJECTION, SOLUTION INTRAMUSCULAR; INTRAVENOUS; SUBCUTANEOUS at 12:00

## 2020-12-02 RX ADMIN — SODIUM CHLORIDE 2517 ML: 9 INJECTION, SOLUTION INTRAVENOUS at 10:12

## 2020-12-02 RX ADMIN — MORPHINE SULFATE 4 MG: 4 INJECTION, SOLUTION INTRAMUSCULAR; INTRAVENOUS at 10:15

## 2020-12-02 RX ADMIN — ALPRAZOLAM 0.25 MG: 0.5 TABLET ORAL at 22:22

## 2020-12-02 RX ADMIN — SODIUM CHLORIDE: 9 INJECTION, SOLUTION INTRAVENOUS at 18:55

## 2020-12-02 RX ADMIN — IBUPROFEN 600 MG: 200 TABLET, FILM COATED ORAL at 12:00

## 2020-12-02 RX ADMIN — PIPERACILLIN AND TAZOBACTAM 3.38 G: 3; .375 INJECTION, POWDER, LYOPHILIZED, FOR SOLUTION INTRAVENOUS at 12:28

## 2020-12-02 RX ADMIN — HYDROMORPHONE HYDROCHLORIDE 0.5 MG: 1 INJECTION, SOLUTION INTRAMUSCULAR; INTRAVENOUS; SUBCUTANEOUS at 22:22

## 2020-12-02 ASSESSMENT — PAIN DESCRIPTION - PAIN TYPE
TYPE: ACUTE PAIN

## 2020-12-02 ASSESSMENT — ENCOUNTER SYMPTOMS
WHEEZING: 0
SHORTNESS OF BREATH: 0
RHINORRHEA: 0
BLOOD IN STOOL: 0
VOMITING: 1
CHEST TIGHTNESS: 0
VOICE CHANGE: 0
ABDOMINAL PAIN: 0
NAUSEA: 1
VOMITING: 0
COUGH: 0
NAUSEA: 0
ANAL BLEEDING: 0
DIARRHEA: 1
COUGH: 1

## 2020-12-02 ASSESSMENT — PAIN SCALES - GENERAL
PAINLEVEL_OUTOF10: 8
PAINLEVEL_OUTOF10: 5
PAINLEVEL_OUTOF10: 4
PAINLEVEL_OUTOF10: 4
PAINLEVEL_OUTOF10: 1
PAINLEVEL_OUTOF10: 3
PAINLEVEL_OUTOF10: 5
PAINLEVEL_OUTOF10: 6

## 2020-12-02 ASSESSMENT — PAIN DESCRIPTION - PROGRESSION: CLINICAL_PROGRESSION: NOT CHANGED

## 2020-12-02 ASSESSMENT — PAIN DESCRIPTION - ONSET: ONSET: AWAKENED FROM SLEEP

## 2020-12-02 ASSESSMENT — PAIN DESCRIPTION - LOCATION
LOCATION: BACK
LOCATION: SCROTUM
LOCATION: BACK

## 2020-12-02 ASSESSMENT — PAIN DESCRIPTION - DESCRIPTORS
DESCRIPTORS: ACHING
DESCRIPTORS: ACHING

## 2020-12-02 ASSESSMENT — PAIN - FUNCTIONAL ASSESSMENT: PAIN_FUNCTIONAL_ASSESSMENT: ACTIVITIES ARE NOT PREVENTED

## 2020-12-02 ASSESSMENT — PAIN DESCRIPTION - ORIENTATION
ORIENTATION: MID;LOWER
ORIENTATION: LEFT

## 2020-12-02 ASSESSMENT — PAIN DESCRIPTION - FREQUENCY: FREQUENCY: CONTINUOUS

## 2020-12-02 NOTE — PROGRESS NOTES
Pharmacy Medication History Note      List of current medications patient is taking is complete. Source of information: patient    Changes made to medication list:  Medications removed (include reason, ex. therapy complete or physician discontinued):  ketoconazole      Medications added/doses adjusted:  Valtrex takes only as needed for cold sore      Other notes (ex. Recent course of antibiotics, Coumadin dosing):    Denies use of other OTC or herbal medications.       Allergies reviewed      Electronically signed by Chandra Arboleda, [facility] on 12/2/2020 at 3:39 PM

## 2020-12-02 NOTE — PROGRESS NOTES
Pt taken to car via wheel chair. Talked with pt and wife about going directly to the ER. Pt and wife voiced understanding.

## 2020-12-02 NOTE — ED NOTES
Ultrasound to bedside for imaging. Patient provided with small amount of ice chips for comfort.       Deena Zhu RN  12/02/20 [id number]

## 2020-12-02 NOTE — ED NOTES
ED nurse-to-nurse bedside report    Chief Complaint   Patient presents with    Fever     3 days, swollen testical, vomiting, pain with urination, trouble urinating      LOC: alert and orientated to name, place, date  Vital signs   Vitals:    12/02/20 1129 12/02/20 1144 12/02/20 1226 12/02/20 1349   BP: (!) 158/78  (!) 142/81    Pulse: 105  105 111   Resp: 22 26 22   Temp:  101.6 °F (38.7 °C)     TempSrc:       SpO2: 92%  94% 99%   Weight:       Height:          Pain:    Pain Interventions: Morphine, Dilaudid, Positioning  Pain Goal: 4  Oxygen: No    Current needs required Room Air   Telemetry: Yes  LDAs:   Peripheral IV 12/02/20 Left Antecubital (Active)   Site Assessment Clean;Dry; Intact 12/02/20 1006   Dressing Status Clean;Dry; Intact 12/02/20 1006     Continuous Infusions:   Mobility: Requires assistance * 1  Ornelas Fall Risk Score:    Fall Risk 11/25/2019 3/18/2019 11/21/2017 5/31/2016   2 or more falls in past year? no no no no   Fall with injury in past year? no no no no     Fall Interventions: Hourly Rounding  Report given to: William Diamond RN  12/02/20 4714

## 2020-12-02 NOTE — ED TRIAGE NOTES
Pt walked to room, with wife at side, tolerated well. Pt stated that about 3 days ago he started to have fever, left testicle swelling. Pt state that this morning he was vomiting and extreme thirst.   Pt is dizzy and cant stand long. Pt vomited yellow fluid unmeasured amount. rolling walker

## 2020-12-02 NOTE — ED NOTES
ED nurse-to-nurse bedside report    Chief Complaint   Patient presents with    Fever     3 days, swollen testical, vomiting, pain with urination, trouble urinating      LOC: alert and orientated to name, place, date  Vital signs   Vitals:    12/02/20 0842 12/02/20 0844 12/02/20 1006   BP:  (!) 165/85 (!) 160/79   Pulse:  122 119   Resp: 24 16 20   Temp:  97.8 °F (36.6 °C) 102.9 °F (39.4 °C)   TempSrc: Tympanic Tympanic Oral   SpO2:  96% 94%   Weight: 185 lb (83.9 kg)     Height: 6' (1.829 m)        Pain:    Pain Interventions: morphine  Pain Goal: 0  Oxygen: No    Current needs required none   Telemetry: Yes  LDAs:   Peripheral IV 12/02/20 Left Antecubital (Active)   Site Assessment Clean;Dry; Intact 12/02/20 1006   Dressing Status Clean;Dry; Intact 12/02/20 1006     Continuous Infusions:   Mobility: Requires assistance * 1  Ornelas Fall Risk Score:    Fall Risk 11/25/2019 3/18/2019 11/21/2017 5/31/2016   2 or more falls in past year? no no no no   Fall with injury in past year? no no no no     Report given to: Anny Morales RN  12/02/20 0264

## 2020-12-02 NOTE — ED NOTES
Pt moved to room 37 at this time and bedside report given to this nurse by Jimmy Summers. Pt resting in bed with admitting provider and Clint TORRES at bedside for assessment at this time. No distress noted at this time.        Rhonda, [address] [place] [address]  12/02/20 9540

## 2020-12-02 NOTE — ED NOTES
Patient laid back for ultrasound and noted to be drowsy after medication administration. Pulse oximetry noted to be fluctuating in the 80s. Supplemental oxygen applied at 2 L via nasal cannula to improve saturation.      Luna Mejia RN  12/02/20 3853

## 2020-12-02 NOTE — ED NOTES
Patient resting on cart more comfortably than previous interactions. Provided with drink of water for comfort and updated on plan of care.      Eduin Lerner RN  12/02/20 0827
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
## 2020-12-02 NOTE — ED PROVIDER NOTES
[facility] [address] [address] EMERGENCY DEPT  Urgent Care Encounter       CHIEF COMPLAINT       Chief Complaint   Patient presents with    Fever     3 days, swollen testical, vomiting, pain with urination, trouble urinating       Nurses Notes reviewed and I agree except as noted in the HPI. HISTORY OF PRESENT ILLNESS   Barbara Van is a 72 y.o. male who presents with complaints of fever, nausea and vomiting and a left swollen testicle. The patient was kicked in the testicles 7 to 10 days ago by his 10year-old child while playing. He stated she kicked him pretty hard. Pain resolved however 3 to 4 days ago he noticed pain was returning as it was well as swelling of the left testicle. He then began having fevers up to 101°F with a lot of chills. Fever was responsive to Tylenol. This morning, around 5:30 AM, he began having nausea and vomiting. Patient reports testicular pain is not too bad when he is a still but with any touching of the testicle the pain increases. He also reports some mild difficulty urinating with a unusual feeling in his urethra when he pees. It is not a burning sensation but just feels \"weird. \"    The history is provided by the patient and the spouse. REVIEW OF SYSTEMS     Review of Systems   Constitutional: Positive for chills, fatigue and fever. HENT: Negative. Respiratory: Positive for cough (Occasional). Negative for shortness of breath. Cardiovascular: Negative for chest pain. Gastrointestinal: Negative for nausea and vomiting. Genitourinary: Positive for difficulty urinating, scrotal swelling and testicular pain. Negative for dysuria, frequency and urgency. History of prostate cancer   Skin: Negative for rash. Neurological: Positive for dizziness (Occasional, positional). Negative for headaches.        PAST MEDICAL HISTORY         Diagnosis Date    Hyperlipidemia     Hypertension     Prostate cancer ([facility] [address] 8/9/2011      External Beam radiation + Intersititial Brachytherapy for Delicia score 4+3, PSA 3.6, stage T2A. Adenocarcinoma of the Prostate completed in October 2010. SURGICALHISTORY     Patient  has a past surgical history that includes Total hip arthroplasty () and Prostate surgery (2010). CURRENT MEDICATIONS       Current Discharge Medication List      CONTINUE these medications which have NOT CHANGED    Details   zinc sulfate (ZINCATE) 220 (50 Zn) MG capsule Take 50 mg by mouth daily      vardenafil (LEVITRA) 20 MG tablet Take 1 tablet by mouth as needed for Erectile Dysfunction  Qty: 30 tablet, Refills: 5    Associated Diagnoses: Other male erectile dysfunction      simvastatin (ZOCOR) 10 MG tablet Take 1 tablet by mouth nightly  Qty: 90 tablet, Refills: 3    Associated Diagnoses: Mixed hypercholesterolemia and hypertriglyceridemia      lisinopril-hydroCHLOROthiazide (PRINZIDE;ZESTORETIC) 20-25 MG per tablet Take 1 tablet by mouth daily  Qty: 90 tablet, Refills: 3    Associated Diagnoses: Essential hypertension      valACYclovir (VALTREX) 500 MG tablet Take 1 tablet by mouth 3 times daily  Qty: 21 tablet, Refills: 5    Associated Diagnoses: Cold sore      ALPRAZolam (XANAX) 0.25 MG tablet Take 0.25 mg by mouth nightly as needed for Sleep. .      Glucosamine-Chondroit-Vit C-Mn (GLUCOSAMINE 1500 COMPLEX PO) Take 1 capsule by mouth      Probiotic Product (PROBIOTIC DAILY PO) Take by mouth daily      Ketoconazole (NIZORAL PO) Take 200 mg by mouth Take two pills one hour before sweating, once weekly 2 weeks      aspirin 81 MG EC tablet Take 81 mg by mouth daily. Ascorbic Acid (VITAMIN C) 500 MG tablet Take 500 mg by mouth daily. fish oil-omega-3 fatty acids 1000 MG capsule Take  by mouth daily. 1400mg         Coenzyme Q10 (CO Q 10) 100 MG CAPS Take 200 mg by mouth daily. ALLERGIES     Patient is has No Known Allergies.     Patients   Immunization History   Administered Date(s) Administered    Hepatitis B 01/29/1993, 03/04/1993, 06/29/1993    Influenza Virus Vaccine 11/25/2014, 12/01/2015, 11/21/2017, 11/04/2019    Influenza, High Dose (Fluzone 65 yrs and older) 11/20/2018    Influenza, Espinal Crum, IM, (6 mo and older Fluzone, Flulaval, Fluarix and 3 yrs and older Afluria) 11/21/2017, 11/04/2019    Pneumococcal Conjugate 13-valent ([id number]) 05/23/2017    Pneumococcal Polysaccharide ([id number]) 11/20/2018    Tdap (Boostrix, Adacel) 11/25/2014    Zoster Live (Zostavax) 01/15/2015       FAMILY HISTORY     Patient's family history includes Cancer (age of onset: 80) in his father and mother. SOCIAL HISTORY     Patient  reports that he quit smoking about 3 years ago. His smoking use included cigarettes. He started smoking about 40 years ago. He has a 7.50 pack-year smoking history. He has never used smokeless tobacco. He reports current alcohol use. He reports that he does not use drugs. PHYSICAL EXAM     ED TRIAGE VITALS  BP: (!) 165/85, Temp: 97.8 °F (36.6 °C), Pulse: 122, Resp: 16, SpO2: 96 %,Estimated body mass index is 25.09 kg/m² as calculated from the following:    Height as of this encounter: 6' (1.829 m). Weight as of this encounter: 185 lb (83.9 kg). ,No LMP for male patient. Physical Exam  Vitals signs and nursing note reviewed. Constitutional:       General: He is not in acute distress. Appearance: He is well-developed. HENT:      Head: Normocephalic and atraumatic. Cardiovascular:      Rate and Rhythm: Regular rhythm. Tachycardia present. Heart sounds: Normal heart sounds, S1 normal and S2 normal.   Pulmonary:      Effort: Pulmonary effort is normal. No respiratory distress. Breath sounds: Normal breath sounds and air entry. Genitourinary:     Penis: Normal and uncircumcised. Scrotum/Testes:         Left: Tenderness and swelling present. Epididymis:      Left: Normal.   Skin:     General: Skin is warm and dry. Neurological:      General: No focal deficit present. Mental Status: He is alert and oriented to person, place, and time. Psychiatric:         Mood and Affect: Mood normal.         Speech: Speech normal.         Behavior: Behavior normal. Behavior is cooperative.          DIAGNOSTIC RESULTS     Labs:  Results for orders placed or performed during the hospital encounter of 12/02/20   CBC auto differential   Result Value Ref Range    WBC 21.3 (H) 4.8 - 10.8 thou/mm3    RBC 4.87 4.70 - 6.10 mill/mm3    Hemoglobin 15.3 14.0 - 18.0 gm/dl    Hematocrit 45.0 42.0 - 52.0 %    MCV 92 80 - 94 fL    MCH 31.4 (H) 27.0 - 31.0 pg    MCHC 34.0 33.0 - 37.0 gm/dl    RDW 13.3 11.5 - 14.5 %    Platelets 728 554 - 181 thou/mm3    MPV 10.3 7.4 - 10.4 fL   POC Basic Metabol Panel without Calcium   Result Value Ref Range    Sodium 134 (L) 138 - 146 meq/l    Potassium, Whole Blood 4.1 3.5 - 4.9 meq/l    Chloride, Whole Blood 101 98 - 109 meq/l    TOTAL CO2, WHOLE BLOOD 26 23 - 33 meq/l    Glucose, Whole Blood 184 (H) 70 - 108 mg/dl    BUN, WHOLE BLOOD 27 (H) 8 - 26 mg/dl    CREATININE 0.9 0.5 - 1.2 mg/dl   Urinalysis   Result Value Ref Range    Glucose, Ur Negative NEGATIVE mg/dl    Bilirubin Urine Negative NEGATIVE    Ketones, Urine Negative NEGATIVE    Specific Gravity, Urine >= 1.030 1.002 - 1.030    Blood, Urine Large (A) NEGATIVE    pH, UA 5.50 5.0 - 9.0    Protein, UA >= 300 (A) NEGATIVE mg/dl    Urobilinogen, Urine 0.20 0.2 - 1.0 eu/dl    Nitrite, Urine Negative NEGATIVE    Leukocyte Esterase, Urine Negative NEGATIVE    Color, UA Dark yellow (A) STRAW-YELLOW    Character, Urine Clear CLEAR-SL CLOUD   Glomerular Filtration Rate, Estimated   Result Value Ref Range    GFR, Estimated 89 ml/min/1.73m2       IMAGING:    No orders to display         EKG:      URGENT CARE COURSE:     Vitals:    12/02/20 0842 12/02/20 0844   BP:  (!) 165/85   Pulse:  122   Resp: 24 16   Temp:  97.8 °F (36.6 °C)   TempSrc: Tympanic Tympanic   SpO2:  96%   Weight: 185 lb (83.9 kg)    Height: 6' (1.829 m) Medications - No data to display         PROCEDURES:  None    FINAL IMPRESSION      1. Acute febrile illness    2. Swelling of left testicle    3. Non-intractable vomiting with nausea, unspecified vomiting type          DISPOSITION/ PLAN     Patient presents with complaints of fever, left testicular pain and swelling and nausea and vomiting. Patient was kicked in the testicles 7 to 10 days ago. Current symptoms present for the last 3 to 4 days with nausea and vomiting starting today. Concern for testicular injury or possible torsion of the testicle. Additionally, patient is febrile with a systemic symptoms, concerning for infection and possible sepsis. [id number]. UA negative for infection but did have a large amount of blood and protein. BMP with an elevated BUN indicating mild degree of dehydration. Condition was discussed with the patient and his wife and it was felt the patient needs emergent evaluation in the emergency department and testicular ultrasound to evaluate the scrotal/testicular swelling. Patient also will need a sepsis work-up as well. Patient and wife did agree to the transfer and chose to go to [facility]. He will travel via private car with his wife. All questions were answered and the patient left the urgent care center in good condition. Report called to the ER charge nurse.       PATIENT REFERRED TO:  MD Susan Guadalupe [address] [id number]      DISCHARGE MEDICATIONS:  Current Discharge Medication List          Current Discharge Medication List          Current Discharge Medication List          CAROLINE Nava CNP    (Please note that portions of this note were completed with a voice recognition program. Efforts were made to edit the dictations but occasionally words are mis-transcribed.)         CAROLINE Nava CNP  12/02/20 0450  [address]  CAROLINE Devlin CNP  12/02/20 4903

## 2020-12-02 NOTE — ED NOTES
Bedside report received from [address], [facility]. Still awaiting antibiotics from pharmacy. Pharmacy contacted.      Anthony García RN  12/02/20 7501

## 2020-12-02 NOTE — H&P
Dr. Chowdhury Course ( Internal Medicine Specialties )  H&P  12/2/2020  4:02 PM    Patient:  Phoenix Elder  YOB: 1950    MRN: [id number]   Sindy:  [id number]   [id number]/[id number]  Primary Care Physician: Claudia Willingham MD  Over 45 mins spent on this evaluation.   dictated    Electronically signed by Kady Funk MD on 12/2/2020 at 4:02 PM         Copy: Primary Care Physician: Claudia Willingham MD

## 2020-12-02 NOTE — ED NOTES
Patient updated on admission status and general surgery consult.       Frida Harvey RN  12/02/20 0374

## 2020-12-03 ENCOUNTER — ANESTHESIA EVENT (OUTPATIENT)
Dept: OPERATING ROOM | Age: 70
DRG: 854 | End: 2020-12-03
Payer: MEDICARE

## 2020-12-03 ENCOUNTER — ANESTHESIA (OUTPATIENT)
Dept: OPERATING ROOM | Age: 70
DRG: 854 | End: 2020-12-03
Payer: MEDICARE

## 2020-12-03 VITALS
OXYGEN SATURATION: 99 % | TEMPERATURE: 96.8 F | SYSTOLIC BLOOD PRESSURE: 115 MMHG | DIASTOLIC BLOOD PRESSURE: 53 MMHG | RESPIRATION RATE: 24 BRPM

## 2020-12-03 LAB
ACINETOBACTER BAUMANNII FILM ARRAY: NOT DETECTED
APTT: 27.2 SECONDS (ref 22–38)
BASOPHILS # BLD: 0.2 %
BASOPHILS ABSOLUTE: 0.1 THOU/MM3 (ref 0–0.1)
BOTTLE TYPE: ABNORMAL
CANDIDA ALBICANS FILM ARRAY: NOT DETECTED
CANDIDA GLABRATA FILM ARRAY: NOT DETECTED
CANDIDA KRUSEI FILM ARRAY: NOT DETECTED
CANDIDA PARAPSILOSIS FILM ARRAY: NOT DETECTED
CANDIDA TROPICALIS FILM ARRAY: NOT DETECTED
CARBAPENEM RESITANT FILM ARRAY: NOT DETECTED
EKG ATRIAL RATE: 290 BPM
EKG P AXIS: -103 DEGREES
EKG Q-T INTERVAL: 336 MS
EKG QRS DURATION: 94 MS
EKG QTC CALCULATION (BAZETT): 521 MS
EKG R AXIS: -56 DEGREES
EKG T AXIS: -10 DEGREES
EKG VENTRICULAR RATE: 145 BPM
ENTERBACTER CLOACAE FILM ARRAY: NOT DETECTED
ENTERBACTERIACEAE FILM ARRAY: NOT DETECTED
ENTEROCOCCUS FILM ARRAY: NOT DETECTED
EOSINOPHIL # BLD: 0.1 %
EOSINOPHILS ABSOLUTE: 0 THOU/MM3 (ref 0–0.4)
ERYTHROCYTE [DISTWIDTH] IN BLOOD BY AUTOMATED COUNT: 13.2 % (ref 11.5–14.5)
ERYTHROCYTE [DISTWIDTH] IN BLOOD BY AUTOMATED COUNT: 46.5 FL (ref 35–45)
ESCHERICHIA COLI FILM ARRAY: NOT DETECTED
HAEMOPHILUS INFLUENZA FILM ARRAY: NOT DETECTED
HCT VFR BLD CALC: 39.6 % (ref 42–52)
HCT VFR BLD CALC: 39.7 % (ref 42–52)
HEMOGLOBIN: 13 GM/DL (ref 14–18)
HEMOGLOBIN: 13.2 GM/DL (ref 14–18)
IMMATURE GRANS (ABS): 1.62 THOU/MM3 (ref 0–0.07)
IMMATURE GRANULOCYTES: 4.9 %
KLEBSIELLA OXYTOCA FILM ARRAY: NOT DETECTED
KLEBSIELLA PNEUMONIAE FILM ARRAY: NOT DETECTED
LISTERIA MONOCYTOGENES FILM ARRAY: NOT DETECTED
LYMPHOCYTES # BLD: 3.7 %
LYMPHOCYTES ABSOLUTE: 1.2 THOU/MM3 (ref 1–4.8)
MCH RBC QN AUTO: 31.7 PG (ref 26–33)
MCHC RBC AUTO-ENTMCNC: 33.2 GM/DL (ref 32.2–35.5)
MCV RBC AUTO: 95.2 FL (ref 80–94)
METHICILLIN RESISTANT FILM ARRAY: ABNORMAL
MONOCYTES # BLD: 7.8 %
MONOCYTES ABSOLUTE: 2.6 THOU/MM3 (ref 0.4–1.3)
NEISSERIA MENIGITIDIS FILM ARRAY: NOT DETECTED
NUCLEATED RED BLOOD CELLS: 0 /100 WBC
PATHOLOGIST REVIEW: ABNORMAL
PLATELET # BLD: 263 THOU/MM3 (ref 130–400)
PMV BLD AUTO: 9.8 FL (ref 9.4–12.4)
PROTEUS FILM ARRAY: NOT DETECTED
PSEUDOMONAS AERUGINOSA FILM ARRAY: DETECTED
RBC # BLD: 4.17 MILL/MM3 (ref 4.7–6.1)
SCAN OF BLOOD SMEAR: NORMAL
SEG NEUTROPHILS: 83.3 %
SEGMENTED NEUTROPHILS ABSOLUTE COUNT: 27.7 THOU/MM3 (ref 1.8–7.7)
SERRATIA MARCESCENS FILM ARRAY: NOT DETECTED
SOURCE OF BLOOD CULTURE: ABNORMAL
STAPH AUREUS FILM ARRAY: NOT DETECTED
STAPHYLOCOCCUS FILM ARRAY: NOT DETECTED
STREP AGALACTIAE FILM ARRAY: NOT DETECTED
STREP PNEUMONIAE FILM ARRAY: NOT DETECTED
STREP PYOCGENES FILM ARRAY: NOT DETECTED
STREPTOCOCCUS FILM ARRAY: NOT DETECTED
T4 FREE: 1.4 NG/DL (ref 0.93–1.76)
TROPONIN T: < 0.01 NG/ML
TROPONIN T: < 0.01 NG/ML
TSH SERPL DL<=0.05 MIU/L-ACNC: 0.38 UIU/ML (ref 0.4–4.2)
VANCOMYCIN RESISTANT FILM ARRAY: ABNORMAL
WBC # BLD: 33.3 THOU/MM3 (ref 4.8–10.8)

## 2020-12-03 PROCEDURE — 4A023N8 MEASUREMENT OF CARDIAC SAMPLING AND PRESSURE, BILATERAL, PERCUTANEOUS APPROACH: ICD-10-PCS | Performed by: INTERNAL MEDICINE

## 2020-12-03 PROCEDURE — 84439 ASSAY OF FREE THYROXINE: CPT

## 2020-12-03 PROCEDURE — 36415 COLL VENOUS BLD VENIPUNCTURE: CPT

## 2020-12-03 PROCEDURE — 2060000000 HC ICU INTERMEDIATE R&B

## 2020-12-03 PROCEDURE — B246ZZ4 ULTRASONOGRAPHY OF RIGHT AND LEFT HEART, TRANSESOPHAGEAL: ICD-10-PCS | Performed by: INTERNAL MEDICINE

## 2020-12-03 PROCEDURE — 0VJ84ZZ INSPECTION OF SCROTUM AND TUNICA VAGINALIS, PERCUTANEOUS ENDOSCOPIC APPROACH: ICD-10-PCS | Performed by: UROLOGY

## 2020-12-03 PROCEDURE — 6370000000 HC RX 637 (ALT 250 FOR IP): Performed by: INTERNAL MEDICINE

## 2020-12-03 PROCEDURE — 6360000002 HC RX W HCPCS: Performed by: INTERNAL MEDICINE

## 2020-12-03 PROCEDURE — 87205 SMEAR GRAM STAIN: CPT

## 2020-12-03 PROCEDURE — 85730 THROMBOPLASTIN TIME PARTIAL: CPT

## 2020-12-03 PROCEDURE — 2580000003 HC RX 258: Performed by: INTERNAL MEDICINE

## 2020-12-03 PROCEDURE — 5A2204Z RESTORATION OF CARDIAC RHYTHM, SINGLE: ICD-10-PCS | Performed by: INTERNAL MEDICINE

## 2020-12-03 PROCEDURE — 87075 CULTR BACTERIA EXCEPT BLOOD: CPT

## 2020-12-03 PROCEDURE — 87070 CULTURE OTHR SPECIMN AEROBIC: CPT

## 2020-12-03 PROCEDURE — APPNB30 APP NON BILLABLE TIME 0-30 MINS: Performed by: UROLOGY

## 2020-12-03 PROCEDURE — 2500000003 HC RX 250 WO HCPCS: Performed by: INTERNAL MEDICINE

## 2020-12-03 PROCEDURE — 87077 CULTURE AEROBIC IDENTIFY: CPT

## 2020-12-03 PROCEDURE — 87186 SC STD MICRODIL/AGAR DIL: CPT

## 2020-12-03 PROCEDURE — 93010 ELECTROCARDIOGRAM REPORT: CPT | Performed by: INTERNAL MEDICINE

## 2020-12-03 PROCEDURE — 93005 ELECTROCARDIOGRAM TRACING: CPT | Performed by: INTERNAL MEDICINE

## 2020-12-03 PROCEDURE — 3700000001 HC ADD 15 MINUTES (ANESTHESIA): Performed by: UROLOGY

## 2020-12-03 PROCEDURE — 85025 COMPLETE CBC W/AUTO DIFF WBC: CPT

## 2020-12-03 PROCEDURE — 2500000003 HC RX 250 WO HCPCS: Performed by: NURSE ANESTHETIST, CERTIFIED REGISTERED

## 2020-12-03 PROCEDURE — 2709999900 HC NON-CHARGEABLE SUPPLY: Performed by: UROLOGY

## 2020-12-03 PROCEDURE — 85014 HEMATOCRIT: CPT

## 2020-12-03 PROCEDURE — 3600000012 HC SURGERY LEVEL 2 ADDTL 15MIN: Performed by: UROLOGY

## 2020-12-03 PROCEDURE — 88307 TISSUE EXAM BY PATHOLOGIST: CPT

## 2020-12-03 PROCEDURE — 0VTB0ZZ RESECTION OF LEFT TESTIS, OPEN APPROACH: ICD-10-PCS | Performed by: UROLOGY

## 2020-12-03 PROCEDURE — 85018 HEMOGLOBIN: CPT

## 2020-12-03 PROCEDURE — 84443 ASSAY THYROID STIM HORMONE: CPT

## 2020-12-03 PROCEDURE — 7100000000 HC PACU RECOVERY - FIRST 15 MIN: Performed by: UROLOGY

## 2020-12-03 PROCEDURE — 84484 ASSAY OF TROPONIN QUANT: CPT

## 2020-12-03 PROCEDURE — 6360000002 HC RX W HCPCS: Performed by: NURSE ANESTHETIST, CERTIFIED REGISTERED

## 2020-12-03 PROCEDURE — 3E0T3BZ INTRODUCTION OF ANESTHETIC AGENT INTO PERIPHERAL NERVES AND PLEXI, PERCUTANEOUS APPROACH: ICD-10-PCS | Performed by: UROLOGY

## 2020-12-03 PROCEDURE — 7100000001 HC PACU RECOVERY - ADDTL 15 MIN: Performed by: UROLOGY

## 2020-12-03 PROCEDURE — 3700000000 HC ANESTHESIA ATTENDED CARE: Performed by: UROLOGY

## 2020-12-03 PROCEDURE — 3600000002 HC SURGERY LEVEL 2 BASE: Performed by: UROLOGY

## 2020-12-03 RX ORDER — HEPARIN SODIUM 10000 [USP'U]/100ML
18 INJECTION, SOLUTION INTRAVENOUS CONTINUOUS
Status: DISCONTINUED | OUTPATIENT
Start: 2020-12-03 | End: 2020-12-04

## 2020-12-03 RX ORDER — 0.9 % SODIUM CHLORIDE 0.9 %
500 INTRAVENOUS SOLUTION INTRAVENOUS ONCE
Status: COMPLETED | OUTPATIENT
Start: 2020-12-03 | End: 2020-12-03

## 2020-12-03 RX ORDER — ONDANSETRON 2 MG/ML
INJECTION INTRAMUSCULAR; INTRAVENOUS PRN
Status: DISCONTINUED | OUTPATIENT
Start: 2020-12-03 | End: 2020-12-03 | Stop reason: SDUPTHER

## 2020-12-03 RX ORDER — DILTIAZEM HYDROCHLORIDE 5 MG/ML
10 INJECTION INTRAVENOUS ONCE
Status: DISCONTINUED | OUTPATIENT
Start: 2020-12-03 | End: 2020-12-03

## 2020-12-03 RX ORDER — HEPARIN SODIUM 1000 [USP'U]/ML
80 INJECTION, SOLUTION INTRAVENOUS; SUBCUTANEOUS ONCE
Status: COMPLETED | OUTPATIENT
Start: 2020-12-03 | End: 2020-12-03

## 2020-12-03 RX ORDER — PROMETHAZINE HYDROCHLORIDE 25 MG/ML
12.5 INJECTION, SOLUTION INTRAMUSCULAR; INTRAVENOUS
Status: DISCONTINUED | OUTPATIENT
Start: 2020-12-03 | End: 2020-12-03 | Stop reason: HOSPADM

## 2020-12-03 RX ORDER — EPHEDRINE SULFATE/0.9% NACL/PF 50 MG/5 ML
SYRINGE (ML) INTRAVENOUS PRN
Status: DISCONTINUED | OUTPATIENT
Start: 2020-12-03 | End: 2020-12-03 | Stop reason: SDUPTHER

## 2020-12-03 RX ORDER — FENTANYL CITRATE 50 UG/ML
25 INJECTION, SOLUTION INTRAMUSCULAR; INTRAVENOUS EVERY 5 MIN PRN
Status: DISCONTINUED | OUTPATIENT
Start: 2020-12-03 | End: 2020-12-03 | Stop reason: HOSPADM

## 2020-12-03 RX ORDER — FENTANYL CITRATE 50 UG/ML
INJECTION, SOLUTION INTRAMUSCULAR; INTRAVENOUS PRN
Status: DISCONTINUED | OUTPATIENT
Start: 2020-12-03 | End: 2020-12-03 | Stop reason: SDUPTHER

## 2020-12-03 RX ORDER — HEPARIN SODIUM 1000 [USP'U]/ML
40 INJECTION, SOLUTION INTRAVENOUS; SUBCUTANEOUS PRN
Status: DISCONTINUED | OUTPATIENT
Start: 2020-12-03 | End: 2020-12-04 | Stop reason: HOSPADM

## 2020-12-03 RX ORDER — LABETALOL 20 MG/4 ML (5 MG/ML) INTRAVENOUS SYRINGE
10 EVERY 10 MIN PRN
Status: DISCONTINUED | OUTPATIENT
Start: 2020-12-03 | End: 2020-12-03 | Stop reason: HOSPADM

## 2020-12-03 RX ORDER — HEPARIN SODIUM 1000 [USP'U]/ML
80 INJECTION, SOLUTION INTRAVENOUS; SUBCUTANEOUS PRN
Status: DISCONTINUED | OUTPATIENT
Start: 2020-12-03 | End: 2020-12-04 | Stop reason: HOSPADM

## 2020-12-03 RX ORDER — HEPARIN SODIUM 10000 [USP'U]/100ML
18 INJECTION, SOLUTION INTRAVENOUS CONTINUOUS
Status: DISCONTINUED | OUTPATIENT
Start: 2020-12-03 | End: 2020-12-03 | Stop reason: SDUPTHER

## 2020-12-03 RX ORDER — DEXAMETHASONE SODIUM PHOSPHATE 10 MG/ML
INJECTION, EMULSION INTRAMUSCULAR; INTRAVENOUS PRN
Status: DISCONTINUED | OUTPATIENT
Start: 2020-12-03 | End: 2020-12-03 | Stop reason: SDUPTHER

## 2020-12-03 RX ORDER — FENTANYL CITRATE 50 UG/ML
50 INJECTION, SOLUTION INTRAMUSCULAR; INTRAVENOUS EVERY 5 MIN PRN
Status: DISCONTINUED | OUTPATIENT
Start: 2020-12-03 | End: 2020-12-03 | Stop reason: HOSPADM

## 2020-12-03 RX ORDER — PROPOFOL 10 MG/ML
INJECTION, EMULSION INTRAVENOUS PRN
Status: DISCONTINUED | OUTPATIENT
Start: 2020-12-03 | End: 2020-12-03 | Stop reason: SDUPTHER

## 2020-12-03 RX ORDER — LIDOCAINE HCL/PF 100 MG/5ML
SYRINGE (ML) INJECTION PRN
Status: DISCONTINUED | OUTPATIENT
Start: 2020-12-03 | End: 2020-12-03 | Stop reason: SDUPTHER

## 2020-12-03 RX ADMIN — DEXAMETHASONE SODIUM PHOSPHATE 10 MG: 10 INJECTION, EMULSION INTRAMUSCULAR; INTRAVENOUS at 11:54

## 2020-12-03 RX ADMIN — PROPOFOL 200 MG: 10 INJECTION, EMULSION INTRAVENOUS at 11:48

## 2020-12-03 RX ADMIN — HYDROMORPHONE HYDROCHLORIDE 1 MG: 1 INJECTION, SOLUTION INTRAMUSCULAR; INTRAVENOUS; SUBCUTANEOUS at 21:50

## 2020-12-03 RX ADMIN — FENTANYL CITRATE 50 MCG: 50 INJECTION, SOLUTION INTRAMUSCULAR; INTRAVENOUS at 12:09

## 2020-12-03 RX ADMIN — DILTIAZEM HYDROCHLORIDE 5 MG/HR: 5 INJECTION INTRAVENOUS at 18:38

## 2020-12-03 RX ADMIN — ATORVASTATIN CALCIUM 10 MG: 10 TABLET, FILM COATED ORAL at 21:08

## 2020-12-03 RX ADMIN — PHENYLEPHRINE HYDROCHLORIDE 200 MCG: 10 INJECTION INTRAVENOUS at 12:22

## 2020-12-03 RX ADMIN — ONDANSETRON HYDROCHLORIDE 4 MG: 4 INJECTION, SOLUTION INTRAMUSCULAR; INTRAVENOUS at 12:40

## 2020-12-03 RX ADMIN — PIPERACILLIN AND TAZOBACTAM 3.38 G: 3; .375 INJECTION, POWDER, LYOPHILIZED, FOR SOLUTION INTRAVENOUS at 10:58

## 2020-12-03 RX ADMIN — LISINOPRIL 20 MG: 20 TABLET ORAL at 08:12

## 2020-12-03 RX ADMIN — FENTANYL CITRATE 50 MCG: 50 INJECTION, SOLUTION INTRAMUSCULAR; INTRAVENOUS at 12:08

## 2020-12-03 RX ADMIN — PHENYLEPHRINE HYDROCHLORIDE 100 MCG: 10 INJECTION INTRAVENOUS at 12:24

## 2020-12-03 RX ADMIN — AMIODARONE HYDROCHLORIDE 150 MG: 1.5 INJECTION, SOLUTION INTRAVENOUS at 20:34

## 2020-12-03 RX ADMIN — HEPARIN SODIUM 7090 UNITS: 1000 INJECTION INTRAVENOUS; SUBCUTANEOUS at 20:38

## 2020-12-03 RX ADMIN — Medication 20 MG: at 12:26

## 2020-12-03 RX ADMIN — AMIODARONE HYDROCHLORIDE 1 MG/MIN: 1.8 INJECTION, SOLUTION INTRAVENOUS at 20:51

## 2020-12-03 RX ADMIN — Medication 100 MG: at 11:48

## 2020-12-03 RX ADMIN — PHENYLEPHRINE HYDROCHLORIDE 200 MCG: 10 INJECTION INTRAVENOUS at 12:01

## 2020-12-03 RX ADMIN — HEPARIN SODIUM 18 UNITS/KG/HR: 10000 INJECTION, SOLUTION INTRAVENOUS at 20:37

## 2020-12-03 RX ADMIN — PIPERACILLIN AND TAZOBACTAM 3.38 G: 3; .375 INJECTION, POWDER, LYOPHILIZED, FOR SOLUTION INTRAVENOUS at 18:46

## 2020-12-03 RX ADMIN — HYDROMORPHONE HYDROCHLORIDE 0.5 MG: 1 INJECTION, SOLUTION INTRAMUSCULAR; INTRAVENOUS; SUBCUTANEOUS at 04:04

## 2020-12-03 RX ADMIN — PHENYLEPHRINE HYDROCHLORIDE 200 MCG: 10 INJECTION INTRAVENOUS at 12:16

## 2020-12-03 RX ADMIN — SODIUM CHLORIDE 500 ML: 9 INJECTION, SOLUTION INTRAVENOUS at 20:19

## 2020-12-03 RX ADMIN — HYDROMORPHONE HYDROCHLORIDE 1 MG: 1 INJECTION, SOLUTION INTRAMUSCULAR; INTRAVENOUS; SUBCUTANEOUS at 17:41

## 2020-12-03 RX ADMIN — OXYCODONE HYDROCHLORIDE AND ACETAMINOPHEN 500 MG: 500 TABLET ORAL at 08:13

## 2020-12-03 RX ADMIN — Medication 10 MG: at 13:00

## 2020-12-03 RX ADMIN — ALPRAZOLAM 0.25 MG: 0.5 TABLET ORAL at 21:50

## 2020-12-03 RX ADMIN — SODIUM CHLORIDE 500 ML: 9 INJECTION, SOLUTION INTRAVENOUS at 16:19

## 2020-12-03 RX ADMIN — PHENYLEPHRINE HYDROCHLORIDE 100 MCG: 10 INJECTION INTRAVENOUS at 11:58

## 2020-12-03 RX ADMIN — Medication 10 MG: at 12:56

## 2020-12-03 RX ADMIN — PHENYLEPHRINE HYDROCHLORIDE 200 MCG: 10 INJECTION INTRAVENOUS at 12:05

## 2020-12-03 RX ADMIN — Medication 10 MG: at 12:25

## 2020-12-03 RX ADMIN — SODIUM CHLORIDE: 9 INJECTION, SOLUTION INTRAVENOUS at 13:11

## 2020-12-03 RX ADMIN — HYDROMORPHONE HYDROCHLORIDE 1 MG: 1 INJECTION, SOLUTION INTRAMUSCULAR; INTRAVENOUS; SUBCUTANEOUS at 08:09

## 2020-12-03 RX ADMIN — SODIUM CHLORIDE 500 ML: 9 INJECTION, SOLUTION INTRAVENOUS at 17:16

## 2020-12-03 ASSESSMENT — PULMONARY FUNCTION TESTS
PIF_VALUE: 10
PIF_VALUE: 11
PIF_VALUE: 11
PIF_VALUE: 1
PIF_VALUE: 10
PIF_VALUE: 3
PIF_VALUE: 11
PIF_VALUE: 10
PIF_VALUE: 17
PIF_VALUE: 12
PIF_VALUE: 11
PIF_VALUE: 12
PIF_VALUE: 11
PIF_VALUE: 11
PIF_VALUE: 10
PIF_VALUE: 29
PIF_VALUE: 10
PIF_VALUE: 20
PIF_VALUE: 11
PIF_VALUE: 22
PIF_VALUE: 11
PIF_VALUE: 11
PIF_VALUE: 1
PIF_VALUE: 11
PIF_VALUE: 17
PIF_VALUE: 11
PIF_VALUE: 15
PIF_VALUE: 10
PIF_VALUE: 11
PIF_VALUE: 2
PIF_VALUE: 10
PIF_VALUE: 11
PIF_VALUE: 16
PIF_VALUE: 11
PIF_VALUE: 18
PIF_VALUE: 11
PIF_VALUE: 11
PIF_VALUE: 27
PIF_VALUE: 11
PIF_VALUE: 10
PIF_VALUE: 11
PIF_VALUE: 12
PIF_VALUE: 0
PIF_VALUE: 11
PIF_VALUE: 1
PIF_VALUE: 0
PIF_VALUE: 5
PIF_VALUE: 17
PIF_VALUE: 0
PIF_VALUE: 1
PIF_VALUE: 11
PIF_VALUE: 17
PIF_VALUE: 12
PIF_VALUE: 11
PIF_VALUE: 2
PIF_VALUE: 17
PIF_VALUE: 11
PIF_VALUE: 17
PIF_VALUE: 11
PIF_VALUE: 17
PIF_VALUE: 1
PIF_VALUE: 17
PIF_VALUE: 10

## 2020-12-03 ASSESSMENT — PAIN SCALES - GENERAL
PAINLEVEL_OUTOF10: 1
PAINLEVEL_OUTOF10: 3
PAINLEVEL_OUTOF10: 7
PAINLEVEL_OUTOF10: 5
PAINLEVEL_OUTOF10: 3
PAINLEVEL_OUTOF10: 5
PAINLEVEL_OUTOF10: 0
PAINLEVEL_OUTOF10: 4
PAINLEVEL_OUTOF10: 6
PAINLEVEL_OUTOF10: 7

## 2020-12-03 ASSESSMENT — PAIN DESCRIPTION - PROGRESSION
CLINICAL_PROGRESSION: NOT CHANGED
CLINICAL_PROGRESSION: NOT CHANGED

## 2020-12-03 ASSESSMENT — PAIN DESCRIPTION - ONSET
ONSET: ON-GOING
ONSET: ON-GOING

## 2020-12-03 ASSESSMENT — PAIN DESCRIPTION - FREQUENCY
FREQUENCY: CONTINUOUS
FREQUENCY: CONTINUOUS

## 2020-12-03 ASSESSMENT — PAIN DESCRIPTION - DESCRIPTORS
DESCRIPTORS: ACHING
DESCRIPTORS: ACHING;DULL

## 2020-12-03 ASSESSMENT — PAIN DESCRIPTION - DIRECTION: RADIATING_TOWARDS: GROIN

## 2020-12-03 ASSESSMENT — PAIN DESCRIPTION - PAIN TYPE
TYPE: ACUTE PAIN
TYPE: ACUTE PAIN;SURGICAL PAIN

## 2020-12-03 ASSESSMENT — PAIN - FUNCTIONAL ASSESSMENT: PAIN_FUNCTIONAL_ASSESSMENT: ACTIVITIES ARE NOT PREVENTED

## 2020-12-03 ASSESSMENT — PAIN DESCRIPTION - LOCATION
LOCATION: SCROTUM
LOCATION: SCROTUM

## 2020-12-03 ASSESSMENT — PAIN DESCRIPTION - ORIENTATION
ORIENTATION: LEFT
ORIENTATION: LEFT

## 2020-12-03 NOTE — CARE COORDINATION
DISASTER CHARTING    12/3/20, 10:38 AM EST    DISCHARGE ONGOING EVALUATION:     Arpit Logan day: 1  Location: -11/651-M Reason for admit: Sepsis [facility]) [A41.9]   Barriers to Discharge: +BC, WBC 33.3,PCT 0.41. Tmax 102.9. IVF, IV zosyn. Urology consulted, planning testicle exploration. PCP: Junior Rodriguez MD  Patient Goals/Plan/Treatment Preferences: Spoke with [employer], he plans return home with his wife. He currently doesn't use DME or HH services, but states he may need HH for wound care. Wife states they would like [facility] if wound care is needed.

## 2020-12-03 NOTE — BRIEF OP NOTE
Brief Postoperative Note      Patient: Reece Hwang  YOB: 1950  MRN: [id number]    Date of Procedure: 12/3/2020    Pre-Op Diagnosis: RUPTURED TESTICLE, SEPSIS    Post-Op Diagnosis: Sepsis, Testicular Contusion/Infection       Procedure: Left Orchiectomy, Scrotal Exploration    Surgeon(s):  Anita Alex MD    Assistant: None    Anesthesia: General    Estimated Blood Loss (mL): Minimal    Complications: None    Specimens:   ID Type Source Tests Collected by Time Destination   1 : LEFT HYDROCELE FLUID Swab Testis CULTURE, ANAEROBIC AND AEROBIC Anita Alex MD 12/3/2020 1236    A : LEFT TESTICLE Tissue Testis SURGICAL PATHOLOGY Anita Alex MD 12/3/2020 1240        Implants:  * No implants in log *      Drains:   Open Drain Left Other (Comment) (Active)       Findings: See dictated operative note    Electronically signed by Odilon Najera PA-C on 12/3/2020 at 1:04 PM

## 2020-12-03 NOTE — ANESTHESIA POSTPROCEDURE EVALUATION
Department of Anesthesiology  Postprocedure Note    Patient: Kristin Beavers  MRN: [id number]  YOB: 1950  Date of evaluation: 12/3/2020  Time:  2:00 PM     Procedure Summary     Date:  12/03/20 Room / Location:  [address] [place]    Anesthesia Start:  6080 Anesthesia Stop:  7095    Procedure:  LEFT TESTICLE  EXPLORATION AND [address] OUT (Left ) Diagnosis:  (RUPTURED TESTICLE, SEPSIS)    Surgeon:  Vena Duane, MD Responsible Provider:  Isaac Kumar DO    Anesthesia Type:  general ASA Status:  2          Anesthesia Type: general    Efrain Phase I: Efrain Score: 5    Efrain Phase II:      Last vitals: Reviewed and per EMR flowsheets.        Anesthesia Post Evaluation    Patient location during evaluation: PACU  Patient participation: complete - patient participated  Level of consciousness: awake  Airway patency: patent  Nausea & Vomiting: no vomiting and no nausea  Cardiovascular status: hemodynamically stable  Respiratory status: acceptable  Hydration status: stable

## 2020-12-03 NOTE — PROGRESS NOTES
Bilateral nares swabbed. Patient's temporal temperature 99.3. Patient to OR without any personal belongings.

## 2020-12-03 NOTE — PROGRESS NOTES
500ml bolus completed and pt HR continues to sustain in the 140's and as high as 150's. Dr. Stewart Section notified. Orders received from another 500ml bolus, and stat EKG and H&H    1720 EKG states \"atrial flutter and ST abnormality\". This RN notified Dr. Dennie Chow, the on call physician, and updated him on patient condition and to review EKG in the computer. 56 Dr. Dennie Chow put in new orders for patient. Consulted Dr. Laurence Peacock.

## 2020-12-03 NOTE — PROGRESS NOTES
Patient up for first time to use restroom since surgery. Patient sore but denies needing anything for pain at this time. Patient able to void yellow urine. Dressing bloody in the compression underwear and was changed. Upon returning to bed 's and pt states he feels hot and sweaty.   approx 5 minutes later and heart rate had not changed, notified Dr. Amy Pollard and an order for 500ml bolus was received

## 2020-12-03 NOTE — PROGRESS NOTES
Patient visited  WBC increased overnight  He denies fevers, but had an episode of chills  Pain controlled  Left testicle swelling slightly increased  No pain, swelling, bruising in thighs or underneath scrotum  Surgery scheduled today with Dr Janis Jang, who visited patient prior to me this AM to discuss surgery

## 2020-12-03 NOTE — PROGRESS NOTES
Pt admitted to  6K9 from ED. Complaints: Left Testicular swelling. IV normal saline infusing into the antecubital left, condition patent and no redness at a rate of 100 mls/ hour with about 700 mls in the bag still. IV site free of s/s of infection or infiltration. Vital signs obtained. Assessment and data collection initiated. Nurse skin assessment performed by Lavanda Sandhoff. Oriented to room. Policies and procedures for 6K explained. Colleen Duggan RN discussed hourly rounding with patient addressing 5 P's. Fall prevention and safety brochure discussed with patient. Bed alarm on. Call light in reach.

## 2020-12-03 NOTE — ANESTHESIA PRE PROCEDURE
Department of Anesthesiology  Preprocedure Note       Name:  Mara Gan   Age:  72 y.o.  :  1950                                          MRN:  [id number]         Date:  12/3/2020      Surgeon: Mirella Antunez):  Birgit Anderson MD    Procedure: Procedure(s):  LEFT TESTICLE  EXPLORATION AND [address] OUT    Medications prior to admission:   Prior to Admission medications    Medication Sig Start Date End Date Taking? Authorizing Provider   zinc sulfate (ZINCATE) 220 (50 Zn) MG capsule Take 50 mg by mouth daily   Yes Historical Provider, MD   vardenafil (LEVITRA) 20 MG tablet Take 1 tablet by mouth as needed for Erectile Dysfunction 10/1/20  Yes Asha Wells MD   simvastatin (ZOCOR) 10 MG tablet Take 1 tablet by mouth nightly 10/1/20  Yes Asha Wells MD   lisinopril-hydroCHLOROthiazide (PRINZIDE;ZESTORETIC) 20-25 MG per tablet Take 1 tablet by mouth daily 10/1/20  Yes Asha Wells MD   ALPRAZolam (XANAX) 0.25 MG tablet Take 0.25 mg by mouth nightly as needed for Sleep. .   Yes Historical Provider, MD   Glucosamine-Chondroit-Vit C-Mn (GLUCOSAMINE 1500 COMPLEX PO) Take 1 capsule by mouth daily    Yes Historical Provider, MD   Probiotic Product (PROBIOTIC DAILY PO) Take 1 tablet by mouth daily    Yes Historical Provider, MD   aspirin 81 MG EC tablet Take 81 mg by mouth daily. Yes Historical Provider, MD   Ascorbic Acid (VITAMIN C) 500 MG tablet Take 500 mg by mouth daily. Yes Historical Provider, MD   fish oil-omega-3 fatty acids 1000 MG capsule Take 1 g by mouth daily    Yes Historical Provider, MD   Coenzyme Q10 (CO Q 10) 100 MG CAPS Take 200 mg by mouth daily.      Yes Historical Provider, MD   valACYclovir (VALTREX) 500 MG tablet Take 1 tablet by mouth 3 times daily 10/1/20   Asha Wells MD       Current medications:    Current Facility-Administered Medications   Medication Dose Route Frequency Provider Last Rate Last Dose    HYDROmorphone (DILAUDID) injection 1 mg  1 mg Intravenous Q4H PRN Flaquita Conroy MD   1 mg at 12/03/20 0809    piperacillin-tazobactam (ZOSYN) 3.375 g in dextrose 5 % 50 mL IVPB extended infusion (mini-bag)  3.375 g Intravenous Q8H Flaquita Conroy MD 12.5 mL/hr at 12/03/20 1058 3.375 g at 12/03/20 1058    ALPRAZolam (XANAX) tablet 0.25 mg  0.25 mg Oral Nightly PRN Flaquita Conroy MD   0.25 mg at 12/02/20 2222    vitamin C (ASCORBIC ACID) tablet 500 mg  500 mg Oral Daily Flaquita Conroy MD   500 mg at 12/03/20 0813    lisinopril (PRINIVIL;ZESTRIL) tablet 20 mg  20 mg Oral Daily Flaquita Conroy MD   20 mg at 12/03/20 0526    atorvastatin (LIPITOR) tablet 10 mg  10 mg Oral Nightly Flaquita Conroy MD   10 mg at 12/02/20 2225    enoxaparin (LOVENOX) injection 40 mg  40 mg Subcutaneous Daily Flaquita Conroy MD   Stopped at 12/03/20 0801    0.9 % sodium chloride infusion   Intravenous Continuous Flaquita Conroy  mL/hr at 12/02/20 1855      acetaminophen (TYLENOL) suppository 650 mg  650 mg Rectal Q4H PRN Flaquita Conroy MD        calcium carbonate (TUMS) chewable tablet 500 mg  500 mg Oral TID PRN Flaquita Conroy MD   500 mg at 12/02/20 2256       Allergies:  No Known Allergies    Problem List:    Patient Active Problem List   Diagnosis Code    Essential hypertension I10    Mixed hypercholesterolemia and hypertriglyceridemia E78.2    H/O prostate cancer Z85.46    Other male erectile dysfunction N52.8    Cold sore B00.1    Sepsis ([address] A41.9       Past Medical History:        Diagnosis Date    Gout     Hyperlipidemia     Hypertension     Prostate cancer ([facility] [address] 8/9/2011      External Beam radiation + Intersititial Brachytherapy for Delicia score 4+3, PSA 3.6, stage T2A. Adenocarcinoma of the Prostate completed in October 2010.            Past Surgical History:        Procedure Laterality Date    COLONOSCOPY      JOINT REPLACEMENT      right hip    PROSTATE SURGERY  2010    brachytherapy    TONSILLECTOMY      TOTAL HIP ARTHROPLASTY      right       Social History: Social History     Tobacco Use    Smoking status: Former Smoker     Packs/day: 0.50     Years: 15.00     Pack years: 7.50     Types: Cigarettes     Start date: 1/1/1980     Last attempt to quit: 1/1/2017     Years since quitting: 3.9    Smokeless tobacco: Never Used    Tobacco comment: 10 cigarettes per day   Substance Use Topics    Alcohol use: Yes     Alcohol/week: 0.0 standard drinks                                Counseling given: Not Answered  Comment: 10 cigarettes per day      Vital Signs (Current):   Vitals:    12/02/20 2139 12/02/20 2300 12/03/20 0400 12/03/20 0800   BP:  109/80 (!) 127/57 139/71   Pulse:  99 105 97   Resp:  18 18 18   Temp:  99 °F (37.2 °C) 98 °F (36.7 °C) 99.5 °F (37.5 °C)   TempSrc:  Oral Oral Oral   SpO2: 97% 95% 96% 96%   Weight: 195 lb 4.8 oz (88.6 kg)      Height:                                                  BP Readings from Last 3 Encounters:   12/03/20 139/71   10/01/20 118/68   11/25/19 130/64       NPO Status:                                                                                 BMI:   Wt Readings from Last 3 Encounters:   12/02/20 195 lb 4.8 oz (88.6 kg)   10/01/20 195 lb (88.5 kg)   11/25/19 203 lb (92.1 kg)     Body mass index is 26.49 kg/m².     CBC:   Lab Results   Component Value Date    WBC 33.3 12/03/2020    RBC 4.17 12/03/2020    HGB 13.2 12/03/2020    HCT 39.7 12/03/2020    MCV 95.2 12/03/2020    RDW 13.3 12/02/2020     12/03/2020       CMP:   Lab Results   Component Value Date     12/02/2020    K 4.1 12/02/2020    K 4.3 10/01/2020     10/01/2020    CO2 22 10/01/2020    BUN 28 10/01/2020    CREATININE 0.9 12/02/2020    LABGLOM 83 10/01/2020    GLUCOSE 133 10/01/2020    GLUCOSE 109 11/23/2016    PROT 7.1 10/01/2020    CALCIUM 9.7 10/01/2020    BILITOT 0.9 10/01/2020    ALKPHOS 49 10/01/2020    AST 26 10/01/2020    ALT 28 10/01/2020       POC Tests: No results for input(s): POCGLU, POCNA, POCK, POCCL, POCBUN, POCHEMO, POCHCT in the last 72 hours. Coags: No results found for: PROTIME, INR, APTT    HCG (If Applicable): No results found for: PREGTESTUR, PREGSERUM, HCG, HCGQUANT     ABGs: No results found for: PHART, PO2ART, JMV8CBN, BQE0SVT, BEART, K8XHRZQM     Type & Screen (If Applicable):  No results found for: LABABO, LABRH    Drug/Infectious Status (If Applicable):  No results found for: HIV, HEPCAB    COVID-19 Screening (If Applicable):   Lab Results   Component Value Date    COVID19 NOT DETECTED 12/02/2020         Anesthesia Evaluation  Patient summary reviewed  Airway: Mallampati: II  TM distance: >3 FB   Neck ROM: full  Mouth opening: > = 3 FB Dental:          Pulmonary:                              Cardiovascular:    (+) hypertension:,       ECG reviewed                        Neuro/Psych:               GI/Hepatic/Renal:             Endo/Other:                     Abdominal:           Vascular:                                        Anesthesia Plan      general     ASA 2       Induction: intravenous. MIPS: Postoperative opioids intended and Prophylactic antiemetics administered. Anesthetic plan and risks discussed with patient and spouse. Plan discussed with MEREDITH. Liza Gaucher.  [facility]   12/3/2020

## 2020-12-03 NOTE — ED NOTES
ED to inpatient nurses report    Chief Complaint   Patient presents with    Fever     3 days, swollen testical, vomiting, pain with urination, trouble urinating      Present to ED from home  LOC: alert and orientated to name, place, date  Vital signs   Vitals:    12/02/20 1547 12/02/20 1605 12/02/20 1855 12/02/20 1904   BP:  (!) 100/54 120/71 120/71   Pulse: 110 108  99   Resp:  18  18   Temp: 98 °F (36.7 °C)      TempSrc: Oral      SpO2:  96%  96%   Weight:       Height:          Oxygen Baseline room air     Current needs required None  Bipap/Cpap No  LDAs:   Peripheral IV 12/02/20 Left Antecubital (Active)   Site Assessment Clean;Dry; Intact 12/02/20 1006   Dressing Status Clean;Dry; Intact 12/02/20 1006     Mobility: Requires assistance * 1  Pending ED orders: None  Present condition: Stable     Electronically signed by Marika Joya RN on 12/2/2020 at 8:18 PM       Marika Joya RN  12/02/20 2019

## 2020-12-03 NOTE — PROGRESS NOTES
Pharmacy Note  [facility] Result    Fletcher Garcia is a 72 y.o. male, with a positive blood culture result    [employer] message received from laboratory employee on 12/3/2020 at 11:29 AM    First Gram stain result: gram negative bacilli    [facility] BCID result: pseudomonas aeruginosa, KPC NOT detected     [facility] BCID and gram stain congruent? Yes    Suspected source? Testicle rupture/ abscess    Patient chart reviewed for signs/symptoms of infection: Yes  /71   Pulse 97   Temp 99.5 °F (37.5 °C) (Oral)   Resp 18   Ht 6' (1.829 m)   Wt 195 lb 4.8 oz (88.6 kg)   SpO2 96%   BMI 26.49 kg/m²   Lab Results   Component Value Date    WBC 33.3 (HH) 12/03/2020       Allergies reviewed  Patient has no known allergies. Renal function reviewed  Estimated Creatinine Clearance: 85 mL/min (based on SCr of 0.9 mg/dL). Current antibiotic regimen: Zosyn    Intervention needed: No    Individual contacted: [id number] MARTÍNEZ Hauser, nurse    Recommendations: Continue Zosyn, will follow sensitivity. Recommendations accepted?  N/A    Travis Mancini  12/3/2020 11:29 AM
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
## 2020-12-04 ENCOUNTER — APPOINTMENT (OUTPATIENT)
Dept: CT IMAGING | Age: 70
DRG: 854 | End: 2020-12-04
Payer: MEDICARE

## 2020-12-04 ENCOUNTER — APPOINTMENT (OUTPATIENT)
Dept: CARDIAC CATH/INVASIVE PROCEDURES | Age: 70
DRG: 854 | End: 2020-12-04
Payer: MEDICARE

## 2020-12-04 PROBLEM — S31.30XA RUPTURE OF TESTIS: Status: ACTIVE | Noted: 2020-12-04

## 2020-12-04 PROBLEM — I48.91 ATRIAL FIBRILLATION, NEW ONSET (HCC): Status: ACTIVE | Noted: 2020-12-04

## 2020-12-04 LAB
APTT: 40.2 SECONDS (ref 22–38)
APTT: 40.3 SECONDS (ref 22–38)
APTT: 52.1 SECONDS (ref 22–38)
BLOOD CULTURE, ROUTINE: ABNORMAL
EKG ATRIAL RATE: 78 BPM
EKG P AXIS: 44 DEGREES
EKG P-R INTERVAL: 162 MS
EKG Q-T INTERVAL: 400 MS
EKG QRS DURATION: 98 MS
EKG QTC CALCULATION (BAZETT): 456 MS
EKG R AXIS: -45 DEGREES
EKG T AXIS: 26 DEGREES
EKG VENTRICULAR RATE: 78 BPM
LV EF: 55 %
LVEF MODALITY: NORMAL
ORGANISM: ABNORMAL
TROPONIN T: < 0.01 NG/ML

## 2020-12-04 PROCEDURE — 85730 THROMBOPLASTIN TIME PARTIAL: CPT

## 2020-12-04 PROCEDURE — 6360000002 HC RX W HCPCS

## 2020-12-04 PROCEDURE — 2580000003 HC RX 258: Performed by: INTERNAL MEDICINE

## 2020-12-04 PROCEDURE — 2500000003 HC RX 250 WO HCPCS: Performed by: INTERNAL MEDICINE

## 2020-12-04 PROCEDURE — 93010 ELECTROCARDIOGRAM REPORT: CPT | Performed by: INTERNAL MEDICINE

## 2020-12-04 PROCEDURE — 84484 ASSAY OF TROPONIN QUANT: CPT

## 2020-12-04 PROCEDURE — 2060000000 HC ICU INTERMEDIATE R&B

## 2020-12-04 PROCEDURE — 93320 DOPPLER ECHO COMPLETE: CPT

## 2020-12-04 PROCEDURE — 93325 DOPPLER ECHO COLOR FLOW MAPG: CPT

## 2020-12-04 PROCEDURE — 6360000004 HC RX CONTRAST MEDICATION: Performed by: INTERNAL MEDICINE

## 2020-12-04 PROCEDURE — 99024 POSTOP FOLLOW-UP VISIT: CPT | Performed by: UROLOGY

## 2020-12-04 PROCEDURE — 92960 CARDIOVERSION ELECTRIC EXT: CPT

## 2020-12-04 PROCEDURE — 93005 ELECTROCARDIOGRAM TRACING: CPT | Performed by: INTERNAL MEDICINE

## 2020-12-04 PROCEDURE — 6370000000 HC RX 637 (ALT 250 FOR IP): Performed by: INTERNAL MEDICINE

## 2020-12-04 PROCEDURE — 6360000002 HC RX W HCPCS: Performed by: INTERNAL MEDICINE

## 2020-12-04 PROCEDURE — 2500000003 HC RX 250 WO HCPCS

## 2020-12-04 PROCEDURE — 93312 ECHO TRANSESOPHAGEAL: CPT

## 2020-12-04 PROCEDURE — 6370000000 HC RX 637 (ALT 250 FOR IP)

## 2020-12-04 PROCEDURE — 36415 COLL VENOUS BLD VENIPUNCTURE: CPT

## 2020-12-04 PROCEDURE — 71275 CT ANGIOGRAPHY CHEST: CPT

## 2020-12-04 PROCEDURE — 92960 CARDIOVERSION ELECTRIC EXT: CPT | Performed by: INTERNAL MEDICINE

## 2020-12-04 PROCEDURE — 99223 1ST HOSP IP/OBS HIGH 75: CPT | Performed by: INTERNAL MEDICINE

## 2020-12-04 RX ORDER — HEPARIN SODIUM 10000 [USP'U]/100ML
23 INJECTION, SOLUTION INTRAVENOUS CONTINUOUS
Status: DISCONTINUED | OUTPATIENT
Start: 2020-12-04 | End: 2020-12-05

## 2020-12-04 RX ORDER — HEPARIN SODIUM 1000 [USP'U]/ML
40 INJECTION, SOLUTION INTRAVENOUS; SUBCUTANEOUS ONCE
Status: COMPLETED | OUTPATIENT
Start: 2020-12-04 | End: 2020-12-04

## 2020-12-04 RX ORDER — SODIUM CHLORIDE 0.9 % (FLUSH) 0.9 %
10 SYRINGE (ML) INJECTION PRN
Status: DISCONTINUED | OUTPATIENT
Start: 2020-12-04 | End: 2020-12-06 | Stop reason: HOSPADM

## 2020-12-04 RX ORDER — HEPARIN SODIUM 10000 [USP'U]/100ML
21 INJECTION, SOLUTION INTRAVENOUS CONTINUOUS
Status: DISCONTINUED | OUTPATIENT
Start: 2020-12-04 | End: 2020-12-04

## 2020-12-04 RX ORDER — SODIUM CHLORIDE 0.9 % (FLUSH) 0.9 %
10 SYRINGE (ML) INJECTION EVERY 12 HOURS SCHEDULED
Status: DISCONTINUED | OUTPATIENT
Start: 2020-12-04 | End: 2020-12-06 | Stop reason: HOSPADM

## 2020-12-04 RX ORDER — HEPARIN SODIUM 10000 [USP'U]/100ML
21 INJECTION, SOLUTION INTRAVENOUS CONTINUOUS
Status: DISCONTINUED | OUTPATIENT
Start: 2020-12-04 | End: 2020-12-04 | Stop reason: SDUPTHER

## 2020-12-04 RX ADMIN — HYDROMORPHONE HYDROCHLORIDE 1 MG: 1 INJECTION, SOLUTION INTRAMUSCULAR; INTRAVENOUS; SUBCUTANEOUS at 13:10

## 2020-12-04 RX ADMIN — PIPERACILLIN AND TAZOBACTAM 3.38 G: 3; .375 INJECTION, POWDER, LYOPHILIZED, FOR SOLUTION INTRAVENOUS at 03:42

## 2020-12-04 RX ADMIN — PIPERACILLIN SODIUM AND TAZOBACTAM SODIUM 4.5 G: 4; .5 INJECTION, POWDER, LYOPHILIZED, FOR SOLUTION INTRAVENOUS at 17:40

## 2020-12-04 RX ADMIN — HYDROMORPHONE HYDROCHLORIDE 1 MG: 1 INJECTION, SOLUTION INTRAMUSCULAR; INTRAVENOUS; SUBCUTANEOUS at 03:04

## 2020-12-04 RX ADMIN — AMIODARONE HYDROCHLORIDE 0.5 MG/MIN: 1.8 INJECTION, SOLUTION INTRAVENOUS at 04:00

## 2020-12-04 RX ADMIN — METOPROLOL TARTRATE 25 MG: 25 TABLET ORAL at 13:13

## 2020-12-04 RX ADMIN — HEPARIN SODIUM 23 UNITS/KG/HR: 10000 INJECTION, SOLUTION INTRAVENOUS at 18:27

## 2020-12-04 RX ADMIN — ANTACID TABLETS 500 MG: 500 TABLET, CHEWABLE ORAL at 20:26

## 2020-12-04 RX ADMIN — HYDROMORPHONE HYDROCHLORIDE 1 MG: 1 INJECTION, SOLUTION INTRAMUSCULAR; INTRAVENOUS; SUBCUTANEOUS at 08:04

## 2020-12-04 RX ADMIN — OXYCODONE HYDROCHLORIDE AND ACETAMINOPHEN 500 MG: 500 TABLET ORAL at 08:03

## 2020-12-04 RX ADMIN — HEPARIN SODIUM 21 UNITS/KG/HR: 10000 INJECTION, SOLUTION INTRAVENOUS at 11:38

## 2020-12-04 RX ADMIN — AMIODARONE HYDROCHLORIDE 0.5 MG/MIN: 1.8 INJECTION, SOLUTION INTRAVENOUS at 13:45

## 2020-12-04 RX ADMIN — PIPERACILLIN SODIUM AND TAZOBACTAM SODIUM 4.5 G: 4; .5 INJECTION, POWDER, LYOPHILIZED, FOR SOLUTION INTRAVENOUS at 13:13

## 2020-12-04 RX ADMIN — SODIUM CHLORIDE: 9 INJECTION, SOLUTION INTRAVENOUS at 20:18

## 2020-12-04 RX ADMIN — HEPARIN SODIUM 3540 UNITS: 1000 INJECTION INTRAVENOUS; SUBCUTANEOUS at 11:37

## 2020-12-04 RX ADMIN — HYDROMORPHONE HYDROCHLORIDE 1 MG: 1 INJECTION, SOLUTION INTRAMUSCULAR; INTRAVENOUS; SUBCUTANEOUS at 23:32

## 2020-12-04 RX ADMIN — METOPROLOL TARTRATE 25 MG: 25 TABLET ORAL at 20:18

## 2020-12-04 RX ADMIN — ATORVASTATIN CALCIUM 10 MG: 10 TABLET, FILM COATED ORAL at 20:18

## 2020-12-04 RX ADMIN — ALPRAZOLAM 0.25 MG: 0.5 TABLET ORAL at 23:32

## 2020-12-04 RX ADMIN — PIPERACILLIN SODIUM AND TAZOBACTAM SODIUM 4.5 G: 4; .5 INJECTION, POWDER, LYOPHILIZED, FOR SOLUTION INTRAVENOUS at 23:31

## 2020-12-04 RX ADMIN — SODIUM CHLORIDE: 9 INJECTION, SOLUTION INTRAVENOUS at 11:46

## 2020-12-04 RX ADMIN — IOPAMIDOL 80 ML: 755 INJECTION, SOLUTION INTRAVENOUS at 10:56

## 2020-12-04 RX ADMIN — LISINOPRIL 20 MG: 20 TABLET ORAL at 08:03

## 2020-12-04 ASSESSMENT — PAIN DESCRIPTION - PROGRESSION
CLINICAL_PROGRESSION: NOT CHANGED

## 2020-12-04 ASSESSMENT — PAIN DESCRIPTION - DESCRIPTORS
DESCRIPTORS: ACHING

## 2020-12-04 ASSESSMENT — PAIN SCALES - GENERAL
PAINLEVEL_OUTOF10: 1
PAINLEVEL_OUTOF10: 4
PAINLEVEL_OUTOF10: 6
PAINLEVEL_OUTOF10: 3
PAINLEVEL_OUTOF10: 4
PAINLEVEL_OUTOF10: 4
PAINLEVEL_OUTOF10: 5
PAINLEVEL_OUTOF10: 5

## 2020-12-04 ASSESSMENT — PAIN DESCRIPTION - ORIENTATION
ORIENTATION: LEFT

## 2020-12-04 ASSESSMENT — PAIN DESCRIPTION - FREQUENCY
FREQUENCY: CONTINUOUS

## 2020-12-04 ASSESSMENT — PAIN DESCRIPTION - ONSET
ONSET: ON-GOING

## 2020-12-04 ASSESSMENT — PAIN DESCRIPTION - PAIN TYPE
TYPE: ACUTE PAIN

## 2020-12-04 ASSESSMENT — PAIN - FUNCTIONAL ASSESSMENT
PAIN_FUNCTIONAL_ASSESSMENT: ACTIVITIES ARE NOT PREVENTED

## 2020-12-04 ASSESSMENT — PAIN DESCRIPTION - LOCATION
LOCATION: SCROTUM

## 2020-12-04 NOTE — PROGRESS NOTES
1900 updated Dr. Pavan Ruano on patients case and condition.     1930 received orders from Dr. Clemons Scales Dr. Mercedes Medina is okay with starting heparin drip, per Dr. Pavan Rauno request.

## 2020-12-04 NOTE — PROGRESS NOTES
Dr. Abhi Mason Progress note       Internal Medicine              Patient:  Reece Hwang  YOB: 1950    MRN: [id number]   Acct:  [de-identified]   [id number]  Primary Care Physician: Neyda Veloz MD    Admit Date: 12/2/2020           Subjective: s/p left orchiectomy. New onset fib/flutter.       Objective:      Physical Exam:    Vitals:  Patient Vitals for the past 24 hrs:   BP Temp Temp src Pulse Resp SpO2   12/04/20 0749 113/76 97.5 °F (36.4 °C) Oral 102 16 96 %   12/04/20 0302 109/65 98.1 °F (36.7 °C) Oral 110 16 95 %   12/04/20 0050 (!) 94/56 -- -- 131 16 93 %   12/03/20 2132 133/79 98 °F (36.7 °C) Oral 138 18 94 %   12/03/20 2045 98/72 97.8 °F (36.6 °C) Oral 118 18 94 %   12/03/20 2042 (!) 127/103 -- -- 138 -- --   12/03/20 2025 101/68 -- -- 140 -- --   12/03/20 1845 97/67 -- -- 140 -- 95 %   12/03/20 1811 101/64 -- -- 142 -- 93 %   12/03/20 1714 108/69 -- -- 146 -- 93 %   12/03/20 1657 115/72 -- -- 147 -- 97 %   12/03/20 1645 -- -- -- -- -- (!) 89 %   12/03/20 1615 118/68 98.4 °F (36.9 °C) Oral 140 18 93 %   12/03/20 1558 117/63 -- -- 140 18 94 %   12/03/20 1545 131/70 -- -- 140 18 93 %   12/03/20 1530 111/62 -- -- 94 18 94 %   12/03/20 1515 114/62 -- -- 94 16 94 %   12/03/20 1500 120/63 -- -- 96 18 94 %   12/03/20 1445 113/75 -- -- 101 18 93 %   12/03/20 1430 109/64 -- -- 104 18 94 %   12/03/20 1415 117/63 97.6 °F (36.4 °C) Oral 104 18 95 %   12/03/20 1355 109/62 -- -- 108 20 94 %   12/03/20 1350 111/65 -- -- 109 23 94 %   12/03/20 1345 115/61 -- -- 110 15 92 %   12/03/20 1340 (!) 107/58 -- -- 110 20 92 %   12/03/20 1335 (!) 106/56 -- -- 118 22 94 %   12/03/20 1330 (!) 111/55 -- -- 120 21 93 %   12/03/20 1325 (!) 105/52 -- -- 112 22 93 %   12/03/20 1320 (!) 106/53 -- -- 113 21 93 %   12/03/20 1315 (!) 99/53 -- -- 122 19 94 %   12/03/20 1313 -- 98.9 °F (37.2 °C) Temporal 120 12 94 %     Weight: Weight: 195 lb 4.8 oz (88.6 kg)     24 hour utilize dose modulation, iteractive reconstruction and/or weight based post dosing when necessary to reduce radiation dose to as low as reasonably  achievable. COMPARISON: MRI scan of the pelvis dated 29 November 2010. IVP dated 15 February 2012. FINDINGS: There are changes of brachytherapy involving the prostate gland. There is a slightly thick-walled bladder. There is a right hip replacement in place. There is atherosclerotic calcification of the abdominal aorta and iliac arteries. There is no free fluid. There is no adenopathy. There is no abscess noted. There is a probable varicocele in the left scrotum. There is a left-sided hydrocele. There are degenerative changes in the lumbar spine. 1. There is a left-sided varicocele and   hydrocele. Scrotal ultrasound may be helpful for better evaluation if clinically indicated. 2. Changes of brachytherapy involving the prostate gland and slightly thick-walled bladder. 3. Right hip replacement in place. 4. Lumbar spondylosis. 5. Atherosclerotic calcification in the abdominal aorta and iliac arteries. Final report electronically signed by DR Neo Rao on 12/2/2020 11:20 AM    Us Scrotum And Testicles    Result Date: 12/2/2020  PROCEDURE: US SCROTUM AND TESTICLES CLINICAL INFORMATION: left testicular injury/pain/swelling. assess for torsion vs abscess . COMPARISON: CT abdomen pelvis 12/2/2020 TECHNIQUE: Grayscale and color Doppler sonographic imaging of the scrotum was performed in the longitudinal and transverse planes. FINDINGS: Right Testicle- 4.9 x 3.2 x 2.0 cm homogeneous in echogenicity. Arterial and venous spectral Doppler flow is documented. No torsion. Right Epididymis- 0.77 x 0.72 x 0.98 cm Small amount of right hydrocele with minimal internal debris. Left Testicle - 4.6 x 4.1 x 3.5 cm homogeneous in echogenicity. Arterial and venous spectral Doppler flow is documented. No torsion.  Left Epididymis - 1.1 x 0.78 x 1.2 cm Complex left hydrocele with internal striations and septations suggesting complexity or hemorrhage. Poorly defined area superior to the left testicle with dirty shadowing and increased vascularity. Patient with known recent trauma and pain. Findings could represent infection, developing phlegmon/developing abscess. . Clear boundaries are difficult to assess. Correlation follow-up advised     1. No evidence of testicular torsion. 2. Abnormal left hemiscrotum with complex, heterogeneously shadowing collection superior to the left testicle which could represent infection/phlegmon or developing abscess in the correct clinical setting. Clinical correlation and follow-up as warranted. **This report has been created using voice recognition software. It may contain minor errors which are inherent in voice recognition technology. ** Final report electronically signed by Dr. Candice Jack on 12/2/2020 1:28 PM    Xr Chest Portable    Result Date: 12/2/2020  PROCEDURE: XR CHEST PORTABLE CLINICAL INFORMATION: fever. COMPARISON: February 6, 2017 TECHNIQUE: Portable. FINDINGS: Patchy infrahilar opacity greatest on the right for which developing infiltrate or atelectasis can be considered. Correlation with symptoms is advised. Costophrenic angles are preserved. Cardiomediastinal silhouette is within normal limits. No acute osseous findings. Patchy infrahilar opacity greatest on the right for which developing infiltrate or atelectasis can be considered. Correlation with symptoms is advised. **This report has been created using voice recognition software. It may contain minor errors which are inherent in voice recognition technology. ** Final report electronically signed by Dr. Candice Jack on 12/2/2020 12:36 PM      EKG:      Diet: DIET GENERAL;        Data:   Scheduled Meds: Scheduled Meds:   piperacillin-tazobactam  4.5 g Intravenous Q6H    vitamin C  500 mg Oral Daily    lisinopril  20 mg Oral Daily    atorvastatin  10 mg Oral Nightly     Continuous Infusions:   amiodarone 0.5 mg/min (12/04/20 0400)    heparin (PORCINE) Infusion 18 Units/kg/hr (12/03/20 2037)    sodium chloride 100 mL/hr at 12/02/20 1855     PRN Meds:. HYDROmorphone, heparin (porcine), heparin (porcine), ALPRAZolam, acetaminophen, calcium carbonate  Continuous Infusions:   amiodarone 0.5 mg/min (12/04/20 0400)    heparin (PORCINE) Infusion 18 Units/kg/hr (12/03/20 2037)    sodium chloride 100 mL/hr at 12/02/20 1855         Assessment   Active Problems:    Sepsis ([address]    Rupture of testis    Atrial fibrillation, new onset ([address]  Resolved Problems:    * No resolved hospital problems. *        Patient Active Problem List   Diagnosis    Essential hypertension    Mixed hypercholesterolemia and hypertriglyceridemia    H/O prostate cancer    Other male erectile dysfunction    Cold sore    Sepsis ([address]    Rupture of testis    Atrial fibrillation, new onset ([address]        Plan   Repeat cbc with diff  Adjust zosyn to 4.5G q6  Cont anticoag  Diet  Pt/ot  scheduled for cardioversion later today. Will change bp med to [employer] signed by Lee Hess MD on 12/4/2020 at 10:00 AM  Over 35 mins spent on this evaluation    Dr. Flaco Cobb is on call this weekend till next Tuesday.

## 2020-12-04 NOTE — PROGRESS NOTES
Physician Progress Note      Flaco CASTRO #:                  [id number]  :                       1950  ADMIT DATE:       2020 8:30 AM  [address] [place] DATE:  RESPONDING  PROVIDER #:        Claudeen Fabry MD          QUERY TEXT:    Pt admitted with Testicular abscess secondary to trauma./ Left testicular   rupture. Pt noted to have SIRS/Sepsis criteria as doc below. If possible,   please respond below and document in your progress notes and discharge summary   if you are evaluating and /or treating any of the following: The medical record reflects the following:  Risk Factors: Testicular abscess  Clinical Indicators: Sepsis Criteria:  Temp 102.9, 101.6, -122, Resp 26,   WBC 21.3, LA 2.5/1.6, Procal 0.41. Testicular abscess secondary to trauma./   Left testicular rupture. ED Provider Notes by Huyen Clemons PA-C at   2020  8:27 AM  --documented:  Sepsis without acute organ dysfunction, due   to unspecified organism  Treatment: Zozyn and IV fluids 100ml/hr and OR planned    Thank you. Please call if you have any questions. P) [phone]. Signed   by Anne Sandra RN Clinical , CRCR  Options provided:  -- Sepsis, present on admission  -- Sepsis, now resolved,  -- Sepsis, not present on admission,  -- No Sepsis, localized infection only  -- Sepsis was ruled out  -- Other - I will add my own diagnosis  -- Disagree - Not applicable / Not valid  -- Disagree - Clinically unable to determine / Unknown  -- Refer to Clinical Documentation Reviewer    PROVIDER RESPONSE TEXT:    This patient has sepsis which was present on admission.     Query created by: Santos Hansen on 12/3/2020 11:07 AM      Electronically signed by:  Claudeen Fabry MD 2020 9:06 AM
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
## 2020-12-04 NOTE — CARE COORDINATION
DISASTER CHARTING    12/4/20, 2:26 PM EST    DISCHARGE ONGOING EVALUATION:     Kathya Shown day: 2  Location: -16/016-A Reason for admit: Sepsis [facility]) [A41.9]   Barriers to Discharge: Cardiology consult for atrial flutter with RVR, plan is a MONIQUE today. Urology following, IV fluids, Amiodarone drip, Heparin drip, pain control, IV antibiotics. PCP: Neyda Veloz MD  Patient Goals/Plan/Treatment Preferences: Plan is to return home with spouse. Will follow for potential needs.

## 2020-12-04 NOTE — PROGRESS NOTES
Patient transfered to  Room 16 in a wheelchair from [address] [place] [address]. Complaint upon arrival to the room: pain   IV site free of s/s of infection or infiltration. Vital signs obtained. Assessment and data collection initiated. Oriented to room. Policies and procedures for  explained. All questions answered with no further questions at this time. 2 person skin check completed.

## 2020-12-04 NOTE — PROGRESS NOTES
Heparin Consult  Lab Results   Component Value Date    APTT 52.1 12/04/2020     Lab Results   Component Value Date    HGB 13.0 12/03/2020    HCT 39.6 12/03/2020     12/03/2020     Current Rate: 21 units/kg/hr    Plan:  Rate: increase to 23 units/kg/hr  Next aPTT: 12/4/20 at [address], PharmD, BCPS, RP 12/4/2020 5:50 PM

## 2020-12-04 NOTE — PRE SEDATION
[address] [address]  Sedation/Analgesia History & Physical    Pt Name: Didi Mock  Account number: [de-identified]  MRN: [id number]  YOB: 1950  Provider Performing Procedure: Savi Kennedy MD  Referring Provider: Ambrocio Schultz MD   Primary Care Physician: Phil Meeks MD  Date: 12/4/2020    PRE-PROCEDURE    Code Status: FULL CODE  Brief History/Pre-Procedure Diagnosis:      New onset atrial fibrillation   AF RVR resistant to medical therapy    Borderline/low BP   Palpitations      Consent: : I have discussed with the patient risks, benefits, and alternatives (and relevant risks, benefits, and side effects related to alternatives or not receiving care), and likelihood of the success. The patient and/or representative appear to understand and agree to proceed. The discussion encompasses risks, benefits, and side effects related to the alternatives and the risks related to not receiving the proposed care, treatment, and services. The indication, risks and benefits of the procedure and possible therapeutic consequences and alternatives were discussed with the patient. The patient was given the opportunity to ask questions and to have them answered to his/her satisfaction. Risks of the procedure include but are not limited to the following: Bleeding, hematoma including retroperitoneal hemmorhage, infection, pain and discomfort, injury to the aorta and other blood vessels, rhythm disturbance, low blood pressure, myocardial infarction, stroke, kidney damage/failure, myocardial perforation, allergic reactions to sedatives/contrast material, loss of pulse/vascular compromise requiring surgery, aneurysm/pseudoaneurysm formation, possible loss of a limb/hand/leg, needing blood transfusion, requiring emergent open heart surgery or emergent coronary intervention, the need for intubation/respiratory support, the requirement for defibrillation/cardioversion, and death.  Alternatives to and omission of the suggested procedure were discussed. The patient had no further questions and wished to proceed; the consent form was signed. MEDICAL HISTORY  []ASHD/ANGINA/MI/CHF   []Hypertension  []Diabetes  []Hyperlipidemia  []Smoking  []Family Hx of ASHD  []Additional information:       has a past medical history of Gout, Hyperlipidemia, Hypertension, and Prostate cancer ([facility] [address] SURGICAL HISTORY   has a past surgical history that includes Total hip arthroplasty (); Prostate surgery (); joint replacement; Tonsillectomy; and Colonoscopy.   Additional information:       ALLERGIES   Allergies as of 2020    (No Known Allergies)     Additional information:       MEDICATIONS   Aspirin  [] 81 mg  [] 325 mg  [] None  Antiplatelet drug therapy use last 5 days  []No []Yes  Coumadin Use Last 5 Days []No []Yes  Other anticoagulant use last 5 days  []No []Yes    Current Facility-Administered Medications:     piperacillin-tazobactam (ZOSYN) 4.5 g in dextrose 5 % 100 mL IVPB (mini-bag), 4.5 g, Intravenous, Q6H, Lee Hess MD    metoprolol tartrate (LOPRESSOR) tablet 25 mg, 25 mg, Oral, BID, Lee Hess MD    heparin 25,000 unit in sodium chloride 0.45% 250 mL infusion, 21 Units/kg/hr, Intravenous, Continuous, Gaby Peña MD, Last Rate: 18.6 mL/hr at 20 1138, 21 Units/kg/hr at 20 1138    HYDROmorphone (DILAUDID) injection 1 mg, 1 mg, Intravenous, Q4H PRN, Lee Hess MD, 1 mg at 20 0804    [COMPLETED] amiodarone (NEXTERONE) 150 mg in dextrose 5% 100 ml, 150 mg, Intravenous, Once, Stopped at 20 2044 **FOLLOWED BY** [] amiodarone (NEXTERONE) 360 mg in dextrose 5% 200 ml, 1 mg/min, Intravenous, Continuous, Stopped at 20 0135 **FOLLOWED BY** amiodarone (NEXTERONE) 360 mg in dextrose 5% 200 ml, 0.5 mg/min, Intravenous, Continuous, Gaby Peña MD, Last Rate: 16.7 mL/hr at 20 0400, 0.5 mg/min at 200    ALPRAZolam (XANAX) tablet 0.25 mg, 0.25 mg, Oral, Nightly PRN, Jairo Metcalf MD, 0.25 mg at 12/03/20 2150    vitamin C (ASCORBIC ACID) tablet 500 mg, 500 mg, Oral, Daily, Jairo Metcalf MD, 500 mg at 12/04/20 0803    atorvastatin (LIPITOR) tablet 10 mg, 10 mg, Oral, Nightly, Jairo Metcalf MD, 10 mg at 12/03/20 2108    0.9 % sodium chloride infusion, , Intravenous, Continuous, Jairo Metcalf MD, Last Rate: 100 mL/hr at 12/04/20 1146    acetaminophen (TYLENOL) suppository 650 mg, 650 mg, Rectal, Q4H PRN, Jairo Metcalf MD    calcium carbonate (TUMS) chewable tablet 500 mg, 500 mg, Oral, TID PRN, Jairo Metcalf MD, 500 mg at 12/02/20 2256  Prior to Admission medications    Medication Sig Start Date End Date Taking? Authorizing Provider   zinc sulfate (ZINCATE) 220 (50 Zn) MG capsule Take 50 mg by mouth daily   Yes Historical Provider, MD   vardenafil (LEVITRA) 20 MG tablet Take 1 tablet by mouth as needed for Erectile Dysfunction 10/1/20  Yes Nevin Quiros MD   simvastatin (ZOCOR) 10 MG tablet Take 1 tablet by mouth nightly 10/1/20  Yes Nevin Quiros MD   lisinopril-hydroCHLOROthiazide (PRINZIDE;ZESTORETIC) 20-25 MG per tablet Take 1 tablet by mouth daily 10/1/20  Yes Nevin Quiros MD   ALPRAZolam (XANAX) 0.25 MG tablet Take 0.25 mg by mouth nightly as needed for Sleep. .   Yes Historical Provider, MD   Glucosamine-Chondroit-Vit C-Mn (GLUCOSAMINE 1500 COMPLEX PO) Take 1 capsule by mouth daily    Yes Historical Provider, MD   Probiotic Product (PROBIOTIC DAILY PO) Take 1 tablet by mouth daily    Yes Historical Provider, MD   aspirin 81 MG EC tablet Take 81 mg by mouth daily. Yes Historical Provider, MD   Ascorbic Acid (VITAMIN C) 500 MG tablet Take 500 mg by mouth daily. Yes Historical Provider, MD   fish oil-omega-3 fatty acids 1000 MG capsule Take 1 g by mouth daily    Yes Historical Provider, MD   Coenzyme Q10 (CO Q 10) 100 MG CAPS Take 200 mg by mouth daily.      Yes Historical Provider, MD   valACYclovir (VALTREX) 500 MG tablet Take 1 tablet by mouth 3 times daily 10/1/20   Abe Mortimer, MD     Additional information:       VITAL SIGNS   Vitals:    12/04/20 1147   BP: 123/68   Pulse: 97   Resp: 18   Temp: 97.7 °F (36.5 °C)   SpO2: 93%       PHYSICAL:   General: No acute distress  HEENT:  Unremarkable for age  Neck: without increased JVD, carotid pulses 2+ bilaterally without bruits  Heart:IIR, S1 & S2 WNL. No murmurs   Lungs: Clear to auscultation    Abdomen: BS present, without HSM, masses, or tenderness    Extremities: without C,C,E.  Pulses 2+ bilaterally   Mental Status: Alert & Oriented        PLANNED PROCEDURE   []Cath  []PCI                []Pacemaker/AICD  [x]MONIQUE             [x]Cardioversion []Peripheral angiography/PTA  []Other:      SEDATION  Planned agent:[x]Midazolam []Meperidine []Sublimaze []Morphine  []Diazepam  []Other:     ASA Classification:  []1 [x]2 []3 []4 []5   Class 1: A normal healthy patient  Class 2: Pt with mild to moderate systemic disease  Class 3: Severe systemic disease or disturbance  Class 4: Severe systemic disorders that are already life threatening. Class 5: Moribund pt with little chances of survival, for more than 24 hours. Mallampati I Airway Classification:   []1 []2 [x]3 []4     [x]Pre-procedure diagnostic studies complete and results available. Comment:    [x]Previous sedation/anesthesia experiences assessed. Comment:    [x]The patient is an appropriate candidate to undergo the planned procedure sedation and anesthesia. (Refer to nursing sedation/analgesia documentation record)  [x]Formulation and discussion of sedation/procedure plan, risks, and expectations with patient and/or responsible adult completed. [x]Patient examined immediately prior to the procedure.  (Refer to nursing sedation/analgesia documentation record)      Chung Ramon MD, Yony Lara    Electronically signed 12/4/2020 at 12:43 PM

## 2020-12-04 NOTE — BRIEF OP NOTE
[facility]  Sedation/Analgesia Post Sedation Record    Pt Name: Barbara Van  Account number: [de-identified]  MRN: [id number]  YOB: 1950  Procedure Performed By: Siva Rojas MD   Primary Care Physician: Anni Wong MD  Date: 12/4/2020    POST-PROCEDURE    Physicians/Assistants: Siva Rojas MD     Procedure Performed:Cardioversion      Sedation/Anesthesia: Versed/ Fentanyl and 2% xylocaine local anesthesia. Estimated Blood Loss: < 50 ml. Specimens Removed: None         Disposition of Specimen: N/A        Complications: No Immediate Complications. Post-procedure Diagnosis/Findings:        Successful MONIQUE guided DCCV     Recommendations:   Continue Heparin gtt   Switch to Eliquis if no further procedures are planned, ok with surgery   Stop Amiodarone gtt in AM and then start Amiodarone 200 mg po daily   Cont Lopressor      Above findings and plan of care were discussed with patient and his wife, questions were answered, agreeable with plan.        Siva Rojas MD, Cecilia Hi   Electronically signed 12/4/2020 at 4:49 PM  Interventional Cardiology

## 2020-12-04 NOTE — PROGRESS NOTES
Urology Progress Note    Chief Complaint: left testicular pain    Subjective: \"    Patient is resting in bed, voiding yellow urine spontaneoulsy, +flatus, +BM, ambulating with assistance, tolerating regular diet, denies any nausea or vomiting. There are complaints of controlled pain at this time. He had an episode of new onset AFIB RVR with hypotension. He was placed on heparin drip. Plans for MONIQUE, cardioversion today by cardiology. He states pain is much improved in testicle with minmal drainage. Swelling decreased. He is doing well in regards to his testicular rupture surgery, they are not upset regarding the loss of his testicle and are very appreciative of our care. Vitals:  /68   Pulse 97   Temp 97.7 °F (36.5 °C) (Oral)   Resp 18   Ht 6' (1.829 m)   Wt 195 lb 4.8 oz (88.6 kg)   SpO2 93%   BMI 26.49 kg/m²   Temp  Av.9 °F (36.6 °C)  Min: 97.5 °F (36.4 °C)  Max: 98.4 °F (36.9 °C)    Intake/Output Summary (Last 24 hours) at 2020 1414  Last data filed at 2020 7846  Gross per 24 hour   Intake 1200 ml   Output 725 ml   Net 475 ml       Social History     Socioeconomic History    Marital status:      Spouse name: Blake Darden    Number of children: 1    Years of education: Not on file    Highest education level: Not on file   Occupational History    Not on file   Social Needs    Financial resource strain: Not hard at all   [employer] insecurity     Worry: Never true     Inability: Never true   [employer] [facility] [employer] needs     Medical: No     Non-medical: No   Tobacco Use    Smoking status: Former Smoker     Packs/day: 0.50     Years: 15.00     Pack years: 7.50     Types: Cigarettes     Start date: 1980     Last attempt to quit: 2017     Years since quitting: 3.9    Smokeless tobacco: Never Used    Tobacco comment: 10 cigarettes per day   Substance and Sexual Activity    Alcohol use:  Yes     Alcohol/week: 0.0 standard drinks    Drug use: Never    Sexual activity: Yes   Lifestyle    Physical activity     Days per week: Not on file     Minutes per session: Not on file    Stress: Not on file   Relationships    Social connections     Talks on phone: Not on file     Gets together: Not on file     Attends Cheondoism service: Not on file     Active member of club or organization: Not on file     Attends meetings of clubs or organizations: Not on file     Relationship status: Not on file    Intimate partner violence     Fear of current or ex partner: Not on file     Emotionally abused: Not on file     Physically abused: Not on file     Forced sexual activity: Not on file   Other Topics Concern    Not on file   Social History Narrative    Not on file     Family History   Problem Relation Age of Onset    Cancer Mother 80        Pancreas,  CHF, DM    Cancer Father 80        Lung cancer, COPD    Prostate Cancer Neg Hx      No Known Allergies      Constitutional: Alert and oriented times x3, no acute distress, and cooperative to examination with appropriate mood and affect. HEENT:   Head:         Normocephalic and atraumatic. Mucous membranes are normal.   Eyes:         EOM are normal.  Nose:    The external appearance of the nose is normal  Ears: The ears appear normal to external inspection. Cardiovascular:       AFIB, tacycardic. Pulmonary/Chest:  Normal respiratory rate and rhthym. No use of accessory muscles. Lungs clear bilaterally. Abdominal:          Soft. No tenderness. Active bowel sounds. Genitalia:    Spontaneously urinating, swelling decreased, mild tenderness to palpation, minimal drainage from penrose  Musculoskeletal:    Normal range of motion. He exhibits no edema or tenderness of lower extremities. Extremities:    No cyanosis, clubbing, or edema present. Neurological:    Alert and oriented.      Labs:  WBC:    Lab Results   Component Value Date    WBC 33.3 12/03/2020     Hemoglobin/Hematocrit:    Lab Results   Component Value Date HGB 13.0 12/03/2020    HCT 39.6 12/03/2020     BMP:    Lab Results   Component Value Date     12/02/2020    K 4.1 12/02/2020    K 4.3 10/01/2020     10/01/2020    CO2 22 10/01/2020    BUN 28 10/01/2020    LABALBU 4.3 10/01/2020    CREATININE 0.9 12/02/2020    CALCIUM 9.7 10/01/2020    LABGLOM 83 10/01/2020           Impression:  Left testicular rupture  Hematuria  New onset AFIB    Plan:  Afebrile overnight  POD # 1 left testicle exploration and wash out with orchiectomy by Dr Kathrin LOBO minimal  Our office will arrange follow up in 1 week to remove left open drain, continue scrotal support   Continue Zosyn 4.5mg  Will continue to monitor     CAROLINE Swanson  12/04/20 2:14 PM  Urology

## 2020-12-05 LAB
ANION GAP SERPL CALCULATED.3IONS-SCNC: 7 MEQ/L (ref 8–16)
APTT: 108.6 SECONDS (ref 22–38)
APTT: 55 SECONDS (ref 22–38)
APTT: 93.1 SECONDS (ref 22–38)
ATYPICAL LYMPHOCYTES: ABNORMAL %
BASOPHILS # BLD: 0.1 %
BASOPHILS ABSOLUTE: 0 THOU/MM3 (ref 0–0.1)
BUN BLDV-MCNC: 25 MG/DL (ref 7–22)
CALCIUM SERPL-MCNC: 8 MG/DL (ref 8.5–10.5)
CHLORIDE BLD-SCNC: 103 MEQ/L (ref 98–111)
CO2: 23 MEQ/L (ref 23–33)
CREAT SERPL-MCNC: 0.7 MG/DL (ref 0.4–1.2)
EOSINOPHIL # BLD: 0 %
EOSINOPHILS ABSOLUTE: 0 THOU/MM3 (ref 0–0.4)
ERYTHROCYTE [DISTWIDTH] IN BLOOD BY AUTOMATED COUNT: 13.7 % (ref 11.5–14.5)
ERYTHROCYTE [DISTWIDTH] IN BLOOD BY AUTOMATED COUNT: 49.1 FL (ref 35–45)
GFR SERPL CREATININE-BSD FRML MDRD: > 90 ML/MIN/1.73M2
GLUCOSE BLD-MCNC: 142 MG/DL (ref 70–108)
HCT VFR BLD CALC: 35.3 % (ref 42–52)
HEMOGLOBIN: 11.6 GM/DL (ref 14–18)
IMMATURE GRANS (ABS): 0.53 THOU/MM3 (ref 0–0.07)
IMMATURE GRANULOCYTES: 2.2 %
LYMPHOCYTES # BLD: 6.3 %
LYMPHOCYTES ABSOLUTE: 1.5 THOU/MM3 (ref 1–4.8)
MAGNESIUM: 2.3 MG/DL (ref 1.6–2.4)
MCH RBC QN AUTO: 31.8 PG (ref 26–33)
MCHC RBC AUTO-ENTMCNC: 32.9 GM/DL (ref 32.2–35.5)
MCV RBC AUTO: 96.7 FL (ref 80–94)
MONOCYTES # BLD: 4 %
MONOCYTES ABSOLUTE: 1 THOU/MM3 (ref 0.4–1.3)
NUCLEATED RED BLOOD CELLS: 0 /100 WBC
PLATELET # BLD: 243 THOU/MM3 (ref 130–400)
PMV BLD AUTO: 10 FL (ref 9.4–12.4)
POTASSIUM SERPL-SCNC: 4.1 MEQ/L (ref 3.5–5.2)
RBC # BLD: 3.65 MILL/MM3 (ref 4.7–6.1)
SCAN OF BLOOD SMEAR: NORMAL
SEG NEUTROPHILS: 87.4 %
SEGMENTED NEUTROPHILS ABSOLUTE COUNT: 21 THOU/MM3 (ref 1.8–7.7)
SODIUM BLD-SCNC: 133 MEQ/L (ref 135–145)
WBC # BLD: 24 THOU/MM3 (ref 4.8–10.8)

## 2020-12-05 PROCEDURE — 99232 SBSQ HOSP IP/OBS MODERATE 35: CPT | Performed by: PHYSICIAN ASSISTANT

## 2020-12-05 PROCEDURE — 2500000003 HC RX 250 WO HCPCS: Performed by: INTERNAL MEDICINE

## 2020-12-05 PROCEDURE — 85025 COMPLETE CBC W/AUTO DIFF WBC: CPT

## 2020-12-05 PROCEDURE — 6370000000 HC RX 637 (ALT 250 FOR IP): Performed by: INTERNAL MEDICINE

## 2020-12-05 PROCEDURE — 2060000000 HC ICU INTERMEDIATE R&B

## 2020-12-05 PROCEDURE — 80048 BASIC METABOLIC PNL TOTAL CA: CPT

## 2020-12-05 PROCEDURE — 6370000000 HC RX 637 (ALT 250 FOR IP): Performed by: PHYSICIAN ASSISTANT

## 2020-12-05 PROCEDURE — 83735 ASSAY OF MAGNESIUM: CPT

## 2020-12-05 PROCEDURE — 2580000003 HC RX 258: Performed by: INTERNAL MEDICINE

## 2020-12-05 PROCEDURE — 99024 POSTOP FOLLOW-UP VISIT: CPT | Performed by: UROLOGY

## 2020-12-05 PROCEDURE — 6360000002 HC RX W HCPCS: Performed by: INTERNAL MEDICINE

## 2020-12-05 PROCEDURE — 36415 COLL VENOUS BLD VENIPUNCTURE: CPT

## 2020-12-05 PROCEDURE — 85730 THROMBOPLASTIN TIME PARTIAL: CPT

## 2020-12-05 RX ORDER — AMIODARONE HYDROCHLORIDE 200 MG/1
200 TABLET ORAL DAILY
Status: DISCONTINUED | OUTPATIENT
Start: 2020-12-05 | End: 2020-12-06 | Stop reason: HOSPADM

## 2020-12-05 RX ORDER — HYDROCODONE BITARTRATE AND ACETAMINOPHEN 5; 325 MG/1; MG/1
1 TABLET ORAL EVERY 6 HOURS PRN
Status: DISCONTINUED | OUTPATIENT
Start: 2020-12-05 | End: 2020-12-06 | Stop reason: HOSPADM

## 2020-12-05 RX ORDER — SENNA PLUS 8.6 MG/1
1 TABLET ORAL NIGHTLY
Status: DISCONTINUED | OUTPATIENT
Start: 2020-12-05 | End: 2020-12-06 | Stop reason: HOSPADM

## 2020-12-05 RX ORDER — LANOLIN ALCOHOL/MO/W.PET/CERES
3 CREAM (GRAM) TOPICAL NIGHTLY PRN
Status: DISCONTINUED | OUTPATIENT
Start: 2020-12-05 | End: 2020-12-06 | Stop reason: HOSPADM

## 2020-12-05 RX ORDER — VALACYCLOVIR HYDROCHLORIDE 500 MG/1
500 TABLET, FILM COATED ORAL 3 TIMES DAILY PRN
Status: DISCONTINUED | OUTPATIENT
Start: 2020-12-05 | End: 2020-12-06 | Stop reason: HOSPADM

## 2020-12-05 RX ORDER — HEPARIN SODIUM 10000 [USP'U]/100ML
25 INJECTION, SOLUTION INTRAVENOUS CONTINUOUS
Status: ACTIVE | OUTPATIENT
Start: 2020-12-05 | End: 2020-12-05

## 2020-12-05 RX ORDER — ACYCLOVIR 50 MG/G
OINTMENT TOPICAL EVERY 6 HOURS PRN
Status: DISCONTINUED | OUTPATIENT
Start: 2020-12-05 | End: 2020-12-06 | Stop reason: HOSPADM

## 2020-12-05 RX ORDER — ACYCLOVIR 50 MG/G
OINTMENT TOPICAL
Status: DISCONTINUED | OUTPATIENT
Start: 2020-12-05 | End: 2020-12-05

## 2020-12-05 RX ADMIN — HYDROMORPHONE HYDROCHLORIDE 1 MG: 1 INJECTION, SOLUTION INTRAMUSCULAR; INTRAVENOUS; SUBCUTANEOUS at 04:58

## 2020-12-05 RX ADMIN — METOPROLOL TARTRATE 25 MG: 25 TABLET ORAL at 21:12

## 2020-12-05 RX ADMIN — HYDROCODONE BITARTRATE AND ACETAMINOPHEN 1 TABLET: 5; 325 TABLET ORAL at 21:11

## 2020-12-05 RX ADMIN — OXYCODONE HYDROCHLORIDE AND ACETAMINOPHEN 500 MG: 500 TABLET ORAL at 08:49

## 2020-12-05 RX ADMIN — VALACYCLOVIR 500 MG: 500 TABLET, FILM COATED ORAL at 12:44

## 2020-12-05 RX ADMIN — PIPERACILLIN SODIUM AND TAZOBACTAM SODIUM 4.5 G: 4; .5 INJECTION, POWDER, LYOPHILIZED, FOR SOLUTION INTRAVENOUS at 23:39

## 2020-12-05 RX ADMIN — SODIUM CHLORIDE: 9 INJECTION, SOLUTION INTRAVENOUS at 08:56

## 2020-12-05 RX ADMIN — PIPERACILLIN SODIUM AND TAZOBACTAM SODIUM 4.5 G: 4; .5 INJECTION, POWDER, LYOPHILIZED, FOR SOLUTION INTRAVENOUS at 18:03

## 2020-12-05 RX ADMIN — APIXABAN 5 MG: 5 TABLET, FILM COATED ORAL at 14:40

## 2020-12-05 RX ADMIN — ANTACID TABLETS 500 MG: 500 TABLET, CHEWABLE ORAL at 21:12

## 2020-12-05 RX ADMIN — APIXABAN 5 MG: 5 TABLET, FILM COATED ORAL at 21:11

## 2020-12-05 RX ADMIN — ATORVASTATIN CALCIUM 10 MG: 10 TABLET, FILM COATED ORAL at 21:12

## 2020-12-05 RX ADMIN — Medication 3 MG: at 21:12

## 2020-12-05 RX ADMIN — AMIODARONE HYDROCHLORIDE 0.5 MG/MIN: 1.8 INJECTION, SOLUTION INTRAVENOUS at 00:17

## 2020-12-05 RX ADMIN — PIPERACILLIN SODIUM AND TAZOBACTAM SODIUM 4.5 G: 4; .5 INJECTION, POWDER, LYOPHILIZED, FOR SOLUTION INTRAVENOUS at 05:40

## 2020-12-05 RX ADMIN — METOPROLOL TARTRATE 25 MG: 25 TABLET ORAL at 08:49

## 2020-12-05 RX ADMIN — ALPRAZOLAM 0.25 MG: 0.5 TABLET ORAL at 21:12

## 2020-12-05 RX ADMIN — Medication 10 ML: at 21:20

## 2020-12-05 RX ADMIN — AMIODARONE HYDROCHLORIDE 200 MG: 200 TABLET ORAL at 10:40

## 2020-12-05 RX ADMIN — PIPERACILLIN SODIUM AND TAZOBACTAM SODIUM 4.5 G: 4; .5 INJECTION, POWDER, LYOPHILIZED, FOR SOLUTION INTRAVENOUS at 12:42

## 2020-12-05 ASSESSMENT — PAIN SCALES - GENERAL
PAINLEVEL_OUTOF10: 4
PAINLEVEL_OUTOF10: 4

## 2020-12-05 ASSESSMENT — PAIN - FUNCTIONAL ASSESSMENT: PAIN_FUNCTIONAL_ASSESSMENT: ACTIVITIES ARE NOT PREVENTED

## 2020-12-05 ASSESSMENT — PAIN DESCRIPTION - FREQUENCY: FREQUENCY: CONTINUOUS

## 2020-12-05 ASSESSMENT — PAIN DESCRIPTION - DESCRIPTORS: DESCRIPTORS: ACHING

## 2020-12-05 ASSESSMENT — PAIN DESCRIPTION - PAIN TYPE: TYPE: ACUTE PAIN;SURGICAL PAIN

## 2020-12-05 ASSESSMENT — PAIN DESCRIPTION - LOCATION: LOCATION: SCROTUM

## 2020-12-05 ASSESSMENT — PAIN DESCRIPTION - ONSET: ONSET: ON-GOING

## 2020-12-05 ASSESSMENT — PAIN DESCRIPTION - PROGRESSION: CLINICAL_PROGRESSION: NOT CHANGED

## 2020-12-05 ASSESSMENT — PAIN DESCRIPTION - ORIENTATION: ORIENTATION: LEFT

## 2020-12-05 NOTE — FLOWSHEET NOTE
12/05/20 1000   Provider Notification   Reason for Communication Evaluate;New orders   Provider Name  [address] CATHLEEN OVERTON   Provider Notification Physician   Method of Communication Secure Message   Response No new orders   Notification Time 1000   1000:RN asked about switching heparin to eliquis per Dafne TORRES note- okay to switch to eliquis   2598: RN updated MD that family is concerned about cost of eliquis and asked if patient could be switched to plavix . 1600: Per MD Peña plavix is not a good alternative, other options are xarelto, prodaxa, and coumadin. Per  [address] CATHLEEN OVERTON consult pharmacy to help with cost of anticoagulations and social work/case management. 1615: Per pharmacy patient is to get a free 30 day trial and recommends family to check with insurance company with cost and copay or RN could call in eliquis to a pharmacy to check pricing ,  [address] CATHLEEN OVERTON okay with calling medication in at this time  1700: RN updated wife and patient of all information from pharmacy/  provided to RN and RN gave 30 day trial coupon to patient wife, at this time patient and wife okay to stay on eliquis. Per wife and patient, patient will be able to get information no pricing from daughter because she works at the pharmacy where patient fills scripts. All questions answered.
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
## 2020-12-05 NOTE — PROGRESS NOTES
Pharmacy Consult      Vanessa Cho is a 72 y.o. male for whom pharmacy has been consulted to dose Eliquis. Patient Active Problem List   Diagnosis    Essential hypertension    Mixed hypercholesterolemia and hypertriglyceridemia    H/O prostate cancer    Other male erectile dysfunction    Cold sore    Sepsis ([facility] [address]    Rupture of testis    Atrial fibrillation, new onset ([facility] [address]    Arterial hypotension    Atrial fibrillation with RVR (HCC)       Allergies:  Patient has no known allergies. Recent Labs     12/05/20  0350   CREATININE 0.7       Ht/Wt:   Ht Readings from Last 1 Encounters:   12/02/20 6' (1.829 m)        Wt Readings from Last 1 Encounters:   12/05/20 199 lb 12.8 oz (90.6 kg)         Estimated Creatinine Clearance: 109 mL/min (based on SCr of 0.7 mg/dL). Assessment/Plan:    - Will initiate Eliquis 5 mg twice daily. Will plan on stopping heparin drip 1 hour after 1st dose of Eliquis given. Thank you for the consult.   Merline Vines, PharmD, BCPS  12/5/2020  1:17 PM

## 2020-12-05 NOTE — PROGRESS NOTES
Heparin Consult  Lab Results   Component Value Date    APTT 55.0 12/05/2020     Lab Results   Component Value Date    HGB 13.0 12/03/2020    HCT 39.6 12/03/2020     12/03/2020       Current Rate: 23 units/kg/hr    Plan:  Bolus: none units/kg  Increase heparin drip rate to 25 units/kg/hr  Next aPTT: today at [address] [place]  12/5/2020   1:06 AM

## 2020-12-05 NOTE — PROGRESS NOTES
Urology Progress Note    Chief Complaint: left testicular pain    Subjective: \"    Patient is resting in bed, voiding yellow urine spontaneoulsy, +flatus, -BM, ambulating with assistance, tolerating regular diet, denies any nausea or vomiting. There are complaints of controlled pain at this time. He pain in scrotum or testicle. Drain in testicle with minmal drainage. No swelling observed. Vitals:  BP (!) 158/83   Pulse 71   Temp 98 °F (36.7 °C)   Resp 18   Ht 6' (1.829 m)   Wt 199 lb 12.8 oz (90.6 kg)   SpO2 100%   BMI 27.10 kg/m²   Temp  Av °F (36.7 °C)  Min: 97.6 °F (36.4 °C)  Max: 98.6 °F (37 °C)    Intake/Output Summary (Last 24 hours) at 2020 1442  Last data filed at 2020 1436  Gross per 24 hour   Intake 40179.95 ml   Output 1700 ml   Net 8746.95 ml       Social History     Socioeconomic History    Marital status:      Spouse name: Estefany Cornell    Number of children: 1    Years of education: Not on file    Highest education level: Not on file   Occupational History    Not on file   Social Needs    Financial resource strain: Not hard at all   [employer] insecurity     Worry: Never true     Inability: Never true   [facility] needs     Medical: No     Non-medical: No   Tobacco Use    Smoking status: Former Smoker     Packs/day: 0.50     Years: 15.00     Pack years: 7.50     Types: Cigarettes     Start date: 1980     Last attempt to quit: 2017     Years since quitting: 3.9    Smokeless tobacco: Never Used    Tobacco comment: 10 cigarettes per day   Substance and Sexual Activity    Alcohol use:  Yes     Alcohol/week: 0.0 standard drinks    Drug use: Never    Sexual activity: Yes   Lifestyle    Physical activity     Days per week: Not on file     Minutes per session: Not on file    Stress: Not on file   Relationships    Social connections     Talks on phone: Not on file     Gets together: Not on file     Attends Worship service: Not on file     Active member of club or organization: Not on file     Attends meetings of clubs or organizations: Not on file     Relationship status: Not on file    Intimate partner violence     Fear of current or ex partner: Not on file     Emotionally abused: Not on file     Physically abused: Not on file     Forced sexual activity: Not on file   Other Topics Concern    Not on file   Social History Narrative    Not on file     Family History   Problem Relation Age of Onset    Cancer Mother 80        Pancreas,  CHF, DM    Cancer Father 80        Lung cancer, COPD    Prostate Cancer Neg Hx      No Known Allergies      Constitutional: Alert and oriented times x3, no acute distress, and cooperative to examination with appropriate mood and affect. HEENT:   Head:         Normocephalic and atraumatic. Mucous membranes are normal.   Eyes:         EOM are normal.  Nose:    The external appearance of the nose is normal  Ears: The ears appear normal to external inspection. Cardiovascular:       AFIB, tacycardic. Pulmonary/Chest:  Normal respiratory rate and rhthym. No use of accessory muscles. Lungs clear bilaterally. Abdominal:          Soft. No tenderness. Active bowel sounds. Genitalia:    Spontaneously urinating, swelling decreased, mild tenderness to palpation, minimal drainage from penrose  Musculoskeletal:    Normal range of motion. He exhibits no edema or tenderness of lower extremities. Extremities:    No cyanosis, clubbing, or edema present. Neurological:    Alert and oriented.      Labs:  WBC:    Lab Results   Component Value Date    WBC 24.0 12/05/2020     Hemoglobin/Hematocrit:    Lab Results   Component Value Date    HGB 11.6 12/05/2020    HCT 35.3 12/05/2020     BMP:    Lab Results   Component Value Date     12/05/2020    K 4.1 12/05/2020     12/05/2020    CO2 23 12/05/2020    BUN 25 12/05/2020    LABALBU 4.3 10/01/2020    CREATININE 0.7 12/05/2020    CALCIUM 8.0 12/05/2020    LABGLOM >90 12/05/2020           Impression:  Left testicular rupture  Hematuria  New onset AFIB    Plan:  Afebrile overnight  POD # 2 left testicle exploration and wash out with orchiectomy by Dr Светлана Franklin  EBL minimal  Our office will arrange follow up in 1 week to remove left open drain, continue scrotal support   Continue Zosyn 4.5mg, can transition to oral antibiotics if appropriate  Will continue to monitor     CAROLINE Lira  12/05/20 2:42 PM  Urology

## 2020-12-05 NOTE — PROGRESS NOTES
for input(s): INR in the last 72 hours.     Radiology    Objective:   Vitals: /80   Pulse 69   Temp 97.8 °F (36.6 °C)   Resp 18   Ht 6' (1.829 m)   Wt 199 lb 12.8 oz (90.6 kg)   SpO2 92%   BMI 27.10 kg/m²   HEENT: Head:pupils react  Neck: supple  Lungs: clear to auscultation  Heart: regular rate and rhythm   Abdomen: soft BS heard NG NT scrotal scar noted  Extremities: warm no  edema  Neurologic:  Alert, oriented X3    Impression:   :   S/p Lt  Orchiectomy for testicular contusion  S/p cardioversion for A fib  Pseudomonas Bacteremia  Leucocytosis  Hyperlipidemia        Plan:    Cont antibiotics  F/u on CBC  Check with cardio regarding transitioning to oral anticoag  Cont billie Buckley MD

## 2020-12-05 NOTE — FLOWSHEET NOTE
12/05/20 1021   Provider Notification   Reason for Communication Evaluate;New orders   Provider Name Dr Santosh Diaz   Provider Notification Physician   Method of Communication Call   Response See orders   Notification Time [phone]: RN updated Dr Santosh Diaz that patient takes takes valacyclovir TID as needed and acyclovir cream that he has been using to put on his cold sores also - Dr Santosh Diaz stated to verify how patient takes at home and okay to order. Order placed for valacylovir for 3 doses and and acyclovir cream as needed q6hrs. RN sent home medication form that patient signed down to pharmacy  1230: RN updated Dr Santosh Diaz that urology is okay with patient starting eliquis. Dr Santosh Diaz order gave RN order to have pharmacy dose eliquis  56: RN called Dr Santosh Diaz to update about patient wanting for something to help him sleep and ask about fluids to stop since patient is eating and drinking well. MD ordered to stop fluids and melatonin 3 mg PRN nightly.

## 2020-12-06 ENCOUNTER — TELEPHONE (OUTPATIENT)
Dept: UROLOGY | Age: 70
End: 2020-12-06

## 2020-12-06 VITALS
BODY MASS INDEX: 27.06 KG/M2 | WEIGHT: 199.8 LBS | HEART RATE: 69 BPM | TEMPERATURE: 97.9 F | DIASTOLIC BLOOD PRESSURE: 78 MMHG | OXYGEN SATURATION: 98 % | HEIGHT: 72 IN | SYSTOLIC BLOOD PRESSURE: 158 MMHG | RESPIRATION RATE: 17 BRPM

## 2020-12-06 LAB
BASOPHILS # BLD: 0.3 %
BASOPHILS ABSOLUTE: 0 THOU/MM3 (ref 0–0.1)
BLOOD CULTURE, ROUTINE: ABNORMAL
BLOOD CULTURE, ROUTINE: ABNORMAL
EOSINOPHIL # BLD: 0.3 %
EOSINOPHILS ABSOLUTE: 0 THOU/MM3 (ref 0–0.4)
ERYTHROCYTE [DISTWIDTH] IN BLOOD BY AUTOMATED COUNT: 13.3 % (ref 11.5–14.5)
ERYTHROCYTE [DISTWIDTH] IN BLOOD BY AUTOMATED COUNT: 46.7 FL (ref 35–45)
HCT VFR BLD CALC: 36.9 % (ref 42–52)
HEMOGLOBIN: 12.2 GM/DL (ref 14–18)
IMMATURE GRANS (ABS): 0.17 THOU/MM3 (ref 0–0.07)
IMMATURE GRANULOCYTES: 1.7 %
LYMPHOCYTES # BLD: 16.1 %
LYMPHOCYTES ABSOLUTE: 1.6 THOU/MM3 (ref 1–4.8)
MCH RBC QN AUTO: 31.5 PG (ref 26–33)
MCHC RBC AUTO-ENTMCNC: 33.1 GM/DL (ref 32.2–35.5)
MCV RBC AUTO: 95.3 FL (ref 80–94)
MONOCYTES # BLD: 8.3 %
MONOCYTES ABSOLUTE: 0.8 THOU/MM3 (ref 0.4–1.3)
NUCLEATED RED BLOOD CELLS: 0 /100 WBC
ORGANISM: ABNORMAL
PLATELET # BLD: 259 THOU/MM3 (ref 130–400)
PMV BLD AUTO: 9.9 FL (ref 9.4–12.4)
RBC # BLD: 3.87 MILL/MM3 (ref 4.7–6.1)
SEG NEUTROPHILS: 73.3 %
SEGMENTED NEUTROPHILS ABSOLUTE COUNT: 7.3 THOU/MM3 (ref 1.8–7.7)
WBC # BLD: 10 THOU/MM3 (ref 4.8–10.8)

## 2020-12-06 PROCEDURE — 2580000003 HC RX 258: Performed by: INTERNAL MEDICINE

## 2020-12-06 PROCEDURE — 6360000002 HC RX W HCPCS: Performed by: INTERNAL MEDICINE

## 2020-12-06 PROCEDURE — 36415 COLL VENOUS BLD VENIPUNCTURE: CPT

## 2020-12-06 PROCEDURE — 6370000000 HC RX 637 (ALT 250 FOR IP): Performed by: INTERNAL MEDICINE

## 2020-12-06 PROCEDURE — 6370000000 HC RX 637 (ALT 250 FOR IP): Performed by: PHYSICIAN ASSISTANT

## 2020-12-06 PROCEDURE — 85025 COMPLETE CBC W/AUTO DIFF WBC: CPT

## 2020-12-06 RX ORDER — LISINOPRIL 20 MG/1
20 TABLET ORAL DAILY
Status: DISCONTINUED | OUTPATIENT
Start: 2020-12-06 | End: 2020-12-06 | Stop reason: HOSPADM

## 2020-12-06 RX ORDER — ASPIRIN 81 MG/1
81 TABLET ORAL DAILY
Qty: 30 TABLET | Refills: 3 | Status: SHIPPED | OUTPATIENT
Start: 2020-12-06 | End: 2020-12-06 | Stop reason: HOSPADM

## 2020-12-06 RX ORDER — SENNA PLUS 8.6 MG/1
1 TABLET ORAL NIGHTLY
Qty: 30 TABLET | Refills: 0 | Status: SHIPPED | OUTPATIENT
Start: 2020-12-06 | End: 2020-12-09

## 2020-12-06 RX ORDER — HYDROCHLOROTHIAZIDE 25 MG/1
25 TABLET ORAL DAILY
Status: DISCONTINUED | OUTPATIENT
Start: 2020-12-06 | End: 2020-12-06 | Stop reason: HOSPADM

## 2020-12-06 RX ORDER — ALPRAZOLAM 0.25 MG/1
0.25 TABLET ORAL NIGHTLY PRN
Qty: 1 TABLET | Refills: 0
Start: 2020-12-06 | End: 2020-12-07

## 2020-12-06 RX ORDER — CIPROFLOXACIN 500 MG/1
500 TABLET, FILM COATED ORAL 2 TIMES DAILY
Qty: 20 TABLET | Refills: 0 | Status: SHIPPED | OUTPATIENT
Start: 2020-12-06 | End: 2020-12-16

## 2020-12-06 RX ORDER — HYDROCODONE BITARTRATE AND ACETAMINOPHEN 5; 325 MG/1; MG/1
1 TABLET ORAL EVERY 6 HOURS PRN
Qty: 21 TABLET | Refills: 0 | Status: SHIPPED | OUTPATIENT
Start: 2020-12-06 | End: 2020-12-13

## 2020-12-06 RX ORDER — LISINOPRIL AND HYDROCHLOROTHIAZIDE 25; 20 MG/1; MG/1
1 TABLET ORAL DAILY
Status: DISCONTINUED | OUTPATIENT
Start: 2020-12-06 | End: 2020-12-06 | Stop reason: DRUGHIGH

## 2020-12-06 RX ORDER — AMIODARONE HYDROCHLORIDE 200 MG/1
200 TABLET ORAL DAILY
Qty: 30 TABLET | Refills: 0 | Status: SHIPPED | OUTPATIENT
Start: 2020-12-06 | End: 2021-01-06 | Stop reason: SDUPTHER

## 2020-12-06 RX ADMIN — PIPERACILLIN SODIUM AND TAZOBACTAM SODIUM 4.5 G: 4; .5 INJECTION, POWDER, LYOPHILIZED, FOR SOLUTION INTRAVENOUS at 06:20

## 2020-12-06 RX ADMIN — HYDROCHLOROTHIAZIDE 25 MG: 25 TABLET ORAL at 08:27

## 2020-12-06 RX ADMIN — LISINOPRIL 20 MG: 20 TABLET ORAL at 08:27

## 2020-12-06 RX ADMIN — AMIODARONE HYDROCHLORIDE 200 MG: 200 TABLET ORAL at 08:27

## 2020-12-06 RX ADMIN — METOPROLOL TARTRATE 25 MG: 25 TABLET ORAL at 08:27

## 2020-12-06 RX ADMIN — APIXABAN 5 MG: 5 TABLET, FILM COATED ORAL at 08:27

## 2020-12-06 RX ADMIN — OXYCODONE HYDROCHLORIDE AND ACETAMINOPHEN 500 MG: 500 TABLET ORAL at 08:27

## 2020-12-06 ASSESSMENT — PAIN SCALES - GENERAL: PAINLEVEL_OUTOF10: 0

## 2020-12-06 NOTE — PROGRESS NOTES
IM Progress Note  Dr. Lucia Fabian for Dr Theo Neal  12/6/2020 6:20 AM      Patient name Lauren Barrios  DOB1950  PCP: Apryl Farrar MD  Admit Date: 12/2/2020  Acct No. [de-identified]    Subjective: Interval History:   No pain   Slept well      Diet: DIET GENERAL;    I/O last 3 completed shifts: In: 6288.5 [P.O.:3820; I.V.:2468.5]  Out: 1700 [Urine:1700]  I/O this shift:  In: 204 [I.V.:204]  Out: -         Admission weight: 185 lb (83.9 kg) as of 12/2/2020  8:30 AM  Wt Readings from Last 3 Encounters:   12/05/20 199 lb 12.8 oz (90.6 kg)   10/01/20 195 lb (88.5 kg)   11/25/19 203 lb (92.1 kg)     Body mass index is 27.1 kg/m². ROS   CVS;  no cp or palpitation  Resp: no SOB or cough  Neuro:  No numbness or weakness or dizziness  Abd: no nausea or vomiting or abd pain      Medications:   Scheduled Meds:   senna  1 tablet Oral Nightly    amiodarone  200 mg Oral Daily    apixaban  5 mg Oral BID    piperacillin-tazobactam  4.5 g Intravenous Q6H    metoprolol tartrate  25 mg Oral BID    sodium chloride flush  10 mL Intravenous 2 times per day    vitamin C  500 mg Oral Daily    atorvastatin  10 mg Oral Nightly     Continuous Infusions:      Labs :     CBC:   Recent Labs     12/03/20  1742 12/05/20  0350 12/06/20  0512   WBC  --  24.0* 10.0   HGB 13.0* 11.6* 12.2*   PLT  --  243 259     BMP:    Recent Labs     12/05/20  0350   *   K 4.1      CO2 23   BUN 25*   CREATININE 0.7   GLUCOSE 142*     Hepatic: No results for input(s): AST, ALT, ALB, BILITOT, ALKPHOS in the last 72 hours. Troponin: No results for input(s): TROPONINI in the last 72 hours. BNP: No results for input(s): BNP in the last 72 hours. Lipids: No results for input(s): CHOL, HDL in the last 72 hours. Invalid input(s): LDLCALCU  INR: No results for input(s): INR in the last 72 hours.     Radiology    Objective:   Vitals: BP (!) 175/83   Pulse 61   Temp 97.6 °F (36.4 °C) (Oral)   Resp 16   Ht 6' (1.829 m)   Wt 199 lb 12.8 oz (90.6 kg)   SpO2 93%   BMI 27.10 kg/m²   HEENT: Head:pupils react  Neck: supple  Lungs: clear to auscultation  Heart: regular rate and rhythm   Abdomen: soft BS heard NG NT   Extremities: warm no  edema  Neurologic:  Alert, oriented X3    Impression:   :   S/p Lt  Orchiectomy for testicular contusion  S/p cardioversion for A fib  Pseudomonas Bacteremia  Leucocytosis resolved  Hyperlipidemia        Plan:    Change to oral antibiotics  Pt made aware of risk of eliquis and amio and appropriate follow up  If BP stable and ok with consultants discharge on cipro for 10 days and f/u velma consultants and family physician      Lin Bray MD

## 2020-12-06 NOTE — PROGRESS NOTES
Spoke with Trinh Snyder with cardiology regarding patient's discharge and whether aspirin is needed. Per Trinh Snyder patient is ok from cardiology standpoint and that the aspirin is not needed.   [facility] notified.

## 2020-12-06 NOTE — PROGRESS NOTES
Discharge teaching and instructions for diagnosis/procedure of Septic completed with patient and wife using teachback method. AVS reviewed. Printed prescriptions given to patient's wife. Patient and wife voiced understanding regarding prescriptions, follow up appointments, and care of self at home.  Discharged ambulatory and in a wheelchair to  home with support per family

## 2020-12-06 NOTE — PROGRESS NOTES
1240: Call received from pharmacist at [facility] drug states that the cipro and amiodarone have a interaction of prolonged QT, wanting to know if the atb needs to be changed. Call placed to Dr. Elvi Carrasco, message left with answering service. 1310Spoke with Dr. Elvi Carrasco regarding cipro, ok to continue with medication due to infection specifics. David Mike pharmacist notified.

## 2020-12-07 ENCOUNTER — CARE COORDINATION (OUTPATIENT)
Dept: CASE MANAGEMENT | Age: 70
End: 2020-12-07

## 2020-12-07 ENCOUNTER — TELEPHONE (OUTPATIENT)
Dept: UROLOGY | Age: 70
End: 2020-12-07

## 2020-12-07 LAB
AEROBIC CULTURE: ABNORMAL
ANAEROBIC CULTURE: ABNORMAL
GRAM STAIN RESULT: ABNORMAL
ORGANISM: ABNORMAL
ORGANISM: ABNORMAL

## 2020-12-07 RX ORDER — AMPICILLIN 500 MG/1
500 CAPSULE ORAL 4 TIMES DAILY
Qty: 28 CAPSULE | Refills: 0 | Status: SHIPPED | OUTPATIENT
Start: 2020-12-07 | End: 2020-12-14

## 2020-12-07 NOTE — TELEPHONE ENCOUNTER
Olena Christine on HIPPA advised to continue the cipro and add ampicillin QID for the culture results. She voiced understanding.

## 2020-12-07 NOTE — CARE COORDINATION
Shabbir 45 Transitions Initial Follow Up Call    Call within 2 business days of discharge: Yes    Patient: Jose Luis Bruce Patient : 1950   MRN: [id number]    Reason for Admission: RUPTURED TESTICLE, SEPSIS  Procedure: Left Orchiectomy, Scrotal Exploration    Discharge Date: 20 RARS: Readmission Risk Score: 14      Last Discharge [facility]       Complaint Diagnosis Description Type Department Provider    20 Fever Sepsis without acute organ dysfunction, due to unspecified organism ([facility] [address] . .. ED to Hosp-Admission (Discharged) (ADMITTED) [address], MD        First attempt to reach pt for the [facility] initial follow up call  Left message to call transition care nurse from [facility] - IGNACIA PARDO @ [phone].        Follow Up  Future Appointments   Date Time Provider Cam Bonds   3/31/2021  9:00 [address] [place], MD SRPX DELPHOS JAMES Loyola RN  Care Transition Nurse  [phone]

## 2020-12-07 NOTE — TELEPHONE ENCOUNTER
Will add Ampicillin 500 mg po four times daily x 7 days, #28, 0 refills per anaerobic and aerobic culture results. Continue Cipro as directed.

## 2020-12-07 NOTE — OP NOTE
delivered and the gubernacular attachments were . We opened the hydrocele sac and sent the fluid for culture. And there was milky and purulent drainage that was expressed. The testicle looked dusky and the epididymis was necrotic. The tunica albuginea was violated, likely from previous trauma. At this point we elected to perform orchiectomy as the testicle was not salvageable. Abbey clamps were placed on the cord. OR towels were placed on all sides of the incision. The cord was then transected between the clamps with scissors. The cord was then ligated with both 0-silk stick-ties and 0-silk free ties. The [facility] was removed and there was no evidence of bleeding. One of the ties was left long. The wound was inspected and exquisite hemostasis was achieved. We elected to leave a Penrose drain and this was sutured to the scrotal skin. The wound was then irrigated. The  And dartos fascia was re-approximated with 3-0 Vicryl. The skin was closed using 4-0 Monocryl in a running, subcuticular manner. 3-0 chromic was also used to close the skin. Dermabond skin glue was place on the incision and allowed to dry. Ilioinguinal nerve block for postoperative pain was performed. The patient was then cleaned off, awakened, extubated, and discharged back to the PACU in good and stable condition.      Follow-Up:  Follow-up next week for drain removal    JAVAD Montesinos
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
## 2020-12-08 ENCOUNTER — CARE COORDINATION (OUTPATIENT)
Dept: CASE MANAGEMENT | Age: 70
End: 2020-12-08

## 2020-12-08 NOTE — CARE COORDINATION
Shabbir 45 Transitions Initial Follow Up Call    Call within 2 business days of discharge: Yes    Patient: Romayne Jan Patient : 1950   MRN: [id number]  Reason for Admission: RUPTURED TESTICLE, SEPSIS  Discharge Date: 20 RARS: Readmission Risk Score: 14      Last Discharge [facility]       Complaint Diagnosis Description Type Department Provider    20 Fever Sepsis without acute organ dysfunction, due to unspecified organism ([facility] [address] . .. ED to Hosp-Admission (Discharged) (ADMITTED) Anne Solis MD           Second attempt for 24 hour call.  Left message to return call    Follow Up  Future Appointments   Date Time Provider Cam Bonds   12/10/2020  9:45 AM [address] Urology [facility] [place] [address]   2021  1:15 PM CAROLINE Izaguirre - CNP [facility] [place] [address] [place]   3/31/2021  9:00 AM Neftaly Johnson MD SRPX DELPHOS [facility] - Chayito Galvez RN

## 2020-12-09 ENCOUNTER — CARE COORDINATION (OUTPATIENT)
Dept: CASE MANAGEMENT | Age: 70
End: 2020-12-09

## 2020-12-09 ENCOUNTER — OFFICE VISIT (OUTPATIENT)
Dept: FAMILY MEDICINE CLINIC | Age: 70
End: 2020-12-09
Payer: MEDICARE

## 2020-12-09 VITALS
SYSTOLIC BLOOD PRESSURE: 132 MMHG | DIASTOLIC BLOOD PRESSURE: 80 MMHG | HEART RATE: 80 BPM | HEIGHT: 72 IN | BODY MASS INDEX: 25.49 KG/M2 | WEIGHT: 188.2 LBS | TEMPERATURE: 96.8 F

## 2020-12-09 PROCEDURE — 1111F DSCHRG MED/CURRENT MED MERGE: CPT | Performed by: FAMILY MEDICINE

## 2020-12-09 PROCEDURE — 99496 TRANSJ CARE MGMT HIGH F2F 7D: CPT | Performed by: FAMILY MEDICINE

## 2020-12-09 NOTE — PROGRESS NOTES
Post-Discharge Transitional Care Management Services or Hospital Follow Up      Bakari Barroso   YOB: 1950    Date of Office Visit:  12/9/2020  Date of Hospital Admission: 12/2/20  Date of Hospital Discharge: 12/6/20  Risk of hospital readmission (high >=14%. Medium >=10%) :Readmission Risk Score: 14    Care management risk score Rising risk (score 2-5) and Complex Care (Scores >=6): 1     Non face to face  following discharge, date last encounter closed (first attempt may have been earlier): 12/9/2020  8:33 AM    Call initiated 2 business days of discharge: Yes    Patient Active Problem List   Diagnosis    Essential hypertension    Mixed hypercholesterolemia and hypertriglyceridemia    H/O prostate cancer    Other male erectile dysfunction    Cold sore    Sepsis ([facility] [address]    Rupture of testis    Atrial fibrillation, new onset ([facility] [address]    Arterial hypotension    Atrial fibrillation with RVR ([facility] [address]       No Known Allergies    Medications listed as ordered at the time of discharge from [facility] Medication Instructions ANDREW:    Printed on:12/09/20 1020   Medication Information                      amiodarone (CORDARONE) 200 MG tablet  Take 1 tablet by mouth daily             ampicillin (PRINCIPEN) 500 MG capsule  Take 1 capsule by mouth 4 times daily for 7 days             apixaban (ELIQUIS) 5 MG TABS tablet  Take 1 tablet by mouth 2 times daily             Ascorbic Acid (VITAMIN C) 500 MG tablet  Take 500 mg by mouth daily. ciprofloxacin (CIPRO) 500 MG tablet  Take 1 tablet by mouth 2 times daily for 10 days             Coenzyme Q10 (CO Q 10) 100 MG CAPS  Take 200 mg by mouth daily.                fish oil-omega-3 fatty acids 1000 MG capsule  Take 1 g by mouth daily              Glucosamine-Chondroit-Vit C-Mn (GLUCOSAMINE 1500 COMPLEX PO)  Take 1 capsule by mouth daily              HYDROcodone-acetaminophen (NORCO) 5-325 MG per tablet  Take 1 tablet by mouth every 6 hours as needed for Pain for up to 7 days. lisinopril-hydroCHLOROthiazide (PRINZIDE;ZESTORETIC) 20-25 MG per tablet  Take 1 tablet by mouth daily             metoprolol tartrate (LOPRESSOR) 25 MG tablet  Take 1 tablet by mouth 2 times daily             Probiotic Product (PROBIOTIC DAILY PO)  Take 1 tablet by mouth daily              simvastatin (ZOCOR) 10 MG tablet  Take 1 tablet by mouth nightly             valACYclovir (VALTREX) 500 MG tablet  Take 1 tablet by mouth 3 times daily             zinc sulfate (ZINCATE) 220 (50 Zn) MG capsule  Take 50 mg by mouth daily                   Medications marked \"taking\" at this time  Outpatient Medications Marked as Taking for the 12/9/20 encounter (Office Visit) with Michael Beal MD   Medication Sig Dispense Refill    ampicillin (PRINCIPEN) 500 MG capsule Take 1 capsule by mouth 4 times daily for 7 days 28 capsule 0    metoprolol tartrate (LOPRESSOR) 25 MG tablet Take 1 tablet by mouth 2 times daily 60 tablet 0    HYDROcodone-acetaminophen (NORCO) 5-325 MG per tablet Take 1 tablet by mouth every 6 hours as needed for Pain for up to 7 days.  21 tablet 0    apixaban (ELIQUIS) 5 MG TABS tablet Take 1 tablet by mouth 2 times daily 60 tablet 0    amiodarone (CORDARONE) 200 MG tablet Take 1 tablet by mouth daily 30 tablet 0    ciprofloxacin (CIPRO) 500 MG tablet Take 1 tablet by mouth 2 times daily for 10 days 20 tablet 0    zinc sulfate (ZINCATE) 220 (50 Zn) MG capsule Take 50 mg by mouth daily      simvastatin (ZOCOR) 10 MG tablet Take 1 tablet by mouth nightly 90 tablet 3    lisinopril-hydroCHLOROthiazide (PRINZIDE;ZESTORETIC) 20-25 MG per tablet Take 1 tablet by mouth daily 90 tablet 3    valACYclovir (VALTREX) 500 MG tablet Take 1 tablet by mouth 3 times daily 21 tablet 5    Glucosamine-Chondroit-Vit C-Mn (GLUCOSAMINE 1500 COMPLEX PO) Take 1 capsule by mouth daily       Probiotic Product (PROBIOTIC DAILY PO) Take 1 tablet by mouth daily  Ascorbic Acid (VITAMIN C) 500 MG tablet Take 500 mg by mouth daily.  fish oil-omega-3 fatty acids 1000 MG capsule Take 1 g by mouth daily       Coenzyme Q10 (CO Q 10) 100 MG CAPS Take 200 mg by mouth daily. Medications patient taking as of now reconciled against medications ordered at time of hospital discharge: Yes    Chief Complaint   Patient presents with    Follow-Up from Hospital       History of Present illness - Follow up of Hospital diagnosis(es):   FINAL DIAGNOSES:  1.  Status post left orchiectomy for testicular contusion with  Pseudomonas bacteremia. 2.  AFib, status post cardioversion, in sinus rhythm and on  anticoagulation. 3.  Leukocytosis, resolved. 4.  Hyperlipidemia history. 5.  History of cold sores, is on antiviral even prior to admission. Inpatient course: Discharge summary reviewed- see chart. Interval history/Current status:   Doing well overall. Still processing the interesting events and hospitalization but feels emotionally doing okay and has good support. Continuing antibiotics. Has f/u with urology and cardiology    Concern about diabetes -- has not been able to work out. /86 at home yesterday. No symptoms at that time. This am 139/72. HR 60s --highest 92 in midday    Lab Results   Component Value Date    LABA1C 6.5 (H) 10/01/2020    LABA1C 6.4 10/31/2019    LABA1C 6.6 11/20/2018     Review of Systems   Constitutional: Negative for fatigue and fever. Respiratory: Negative for shortness of breath. Cardiovascular: Negative for chest pain and leg swelling. Vitals:    12/09/20 0950   BP: 132/80   Site: Left Upper Arm   Position: Sitting   Cuff Size: Medium Adult   Pulse: 80   Temp: 96.8 °F (36 °C)   TempSrc: Temporal   Weight: 188 lb 3.2 oz (85.4 kg)   Height: 6' (1.829 m)     Body mass index is 25.52 kg/m².    Wt Readings from Last 3 Encounters:   12/09/20 188 lb 3.2 oz (85.4 kg)   12/05/20 199 lb 12.8 oz (90.6 kg)   10/01/20 195 lb (88.5 kg)     BP Readings from Last 3 Encounters:   12/09/20 132/80   12/06/20 (!) 158/78   12/03/20 (!) 115/53        Physical Exam:  General Appearance: alert and oriented to person, place and time, well-developed and well-nourished, in no acute distress  Pulmonary/Chest: clear to auscultation bilaterally- no wheezes, rales or rhonchi, normal air movement, no respiratory distress  Cardiovascular: normal rate, normal S1 and S2 and no gallops  Genitourinary: genitals normal without hernia or inguinal adenopathy  Musculoskeletal: normal range of motion, no joint swelling, deformity or tenderness    Assessment/Plan:  1. Atrial fibrillation, new onset ([address]  Controlled currently. Has enough medication until cardiology follow up  - [address] MED LIST    2. Rupture of testis, subsequent encounter  Wound healing well. No signs of infection. Antibiotics on board. Following closely with urology  - DC DISCHARGE MEDS RECONCILED W/ CURRENT OUTPATIENT MED LIST    3. Sepsis without acute organ dysfunction, due to unspecified organism [facility])  Resolved situation, still on antibiotics   - DC DISCHARGE MEDS RECONCILED W/ CURRENT OUTPATIENT MED LIST    4. Essential hypertension  Controlled currently. Following closely    5. Weight loss  Patient did not eat much in hospital. Had need for aggressive IVFs with sepsis. He is euvolemic currently. -- discussed need for improved nutrition to enhance healing from infection and improve immune system response along with cardiovascular health  -- we discussed increasing lean proteins from plant sources and some lean animal protein. Encouraged increased vegetables and fruits. discouraged sugar and highly processed foods.     - he and wife voiced understanding    Medical Decision Making: moderate complexity

## 2020-12-09 NOTE — CARE COORDINATION
Shabbir 45 Transitions Initial Follow Up Call    Call within 2 business days of discharge: Yes    Patient: Anna Schilling Patient : 1950   MRN: [id number]  Reason for Admission: Sepsis without acute organ dysfunction, due to unspecified organism  Discharge Date: 20 RARS: Readmission Risk Score: 14      Last Discharge [facility]       Complaint Diagnosis Description Type Department Provider    20 Fever Sepsis without acute organ dysfunction, due to unspecified organism ([facility] [address] . .. ED to Hosp-Admission (Discharged) (ADMITTED) Thai Huff MD           Third attempt for 24 hour call. Left message to return call.     Follow Up  Future Appointments   Date Time Provider Cam Bonds   2020  9:40 AM MD KIMMIE Briscoe [facility] [place]   12/10/2020  9:45 AM Elizabet Ibarra Urology P - [place]   2021  1:15 PM CAROLINE Wilkins - CNP [facility] [place]   3/31/2021  9:00 AM MD KIMMIE BriscoePHOS P - Bushra Nixon RN

## 2020-12-10 ENCOUNTER — OFFICE VISIT (OUTPATIENT)
Dept: UROLOGY | Age: 70
End: 2020-12-10
Payer: MEDICARE

## 2020-12-10 VITALS — HEIGHT: 72 IN | WEIGHT: 189 LBS | BODY MASS INDEX: 25.6 KG/M2 | TEMPERATURE: 97 F

## 2020-12-10 LAB
BILIRUBIN URINE: NEGATIVE
BLOOD URINE, POC: ABNORMAL
CHARACTER, URINE: CLEAR
COLOR, URINE: YELLOW
GLUCOSE URINE: NEGATIVE MG/DL
KETONES, URINE: NEGATIVE
LEUKOCYTE CLUMPS, URINE: NEGATIVE
NITRITE, URINE: NEGATIVE
PH, URINE: 7 (ref 5–9)
PROTEIN, URINE: 30 MG/DL
SPECIFIC GRAVITY, URINE: >= 1.03 (ref 1–1.03)
UROBILINOGEN, URINE: 0.2 EU/DL (ref 0–1)

## 2020-12-10 PROCEDURE — 81003 URINALYSIS AUTO W/O SCOPE: CPT | Performed by: NURSE PRACTITIONER

## 2020-12-10 PROCEDURE — 99024 POSTOP FOLLOW-UP VISIT: CPT | Performed by: NURSE PRACTITIONER

## 2020-12-10 RX ORDER — ALPRAZOLAM 0.25 MG/1
0.25 TABLET ORAL NIGHTLY PRN
COMMUNITY
End: 2021-03-31

## 2020-12-10 NOTE — PROGRESS NOTES
(PRINZIDE;ZESTORETIC) 20-25 MG per tablet Take 1 tablet by mouth daily 90 tablet 3    Glucosamine-Chondroit-Vit C-Mn (GLUCOSAMINE 1500 COMPLEX PO) Take 1 capsule by mouth daily       Probiotic Product (PROBIOTIC DAILY PO) Take 1 tablet by mouth daily       Ascorbic Acid (VITAMIN C) 500 MG tablet Take 500 mg by mouth daily.  fish oil-omega-3 fatty acids 1000 MG capsule Take 1 g by mouth daily       Coenzyme Q10 (CO Q 10) 100 MG CAPS Take 200 mg by mouth daily. No current facility-administered medications for this visit. Past Medical History  Andrew Dimas  has a past medical history of Gout, Hyperlipidemia, Hypertension, and Prostate cancer ([facility] [address] Past Surgical History  The patient  has a past surgical history that includes Total hip arthroplasty (); Prostate surgery (2010); joint replacement; Tonsillectomy; Colonoscopy; and pr explore scrotum (Left, 12/3/2020). Family History  This patient's family history includes Cancer (age of onset: 80) in his father and mother. Social History  Andrew Dimas  reports that he quit smoking about 3 years ago. His smoking use included cigarettes. He started smoking about 40 years ago. He has a 7.50 pack-year smoking history. He has never used smokeless tobacco. He reports current alcohol use. He reports that he does not use drugs. Subjective:      Review of Systems   Constitutional: Negative for activity change, appetite change, chills, diaphoresis, fatigue, fever and unexpected weight change. Gastrointestinal: Negative for abdominal pain. Genitourinary: Positive for scrotal swelling (minimal and improving). Negative for decreased urine volume, difficulty urinating, dysuria, flank pain, frequency, hematuria, penile swelling and urgency. Musculoskeletal: Negative for back pain.        Objective:   Temp 97 °F (36.1 °C) (Temporal)   Ht 6' (1.829 m)   Wt 189 lb (85.7 kg)   BMI 25.63 kg/m²     Physical Exam  Constitutional:       General: He is not in acute distress. Appearance: Normal appearance. He is not ill-appearing or diaphoretic. HENT:      Head: Normocephalic and atraumatic. Right Ear: External ear normal.      Left Ear: External ear normal.      Nose: Nose normal.      Mouth/Throat:      Mouth: Mucous membranes are moist.   Eyes:      General: No scleral icterus. Right eye: No discharge. Left eye: No discharge. Cardiovascular:      Rate and Rhythm: Normal rate and regular rhythm. Pulmonary:      Effort: Pulmonary effort is normal. No respiratory distress. Breath sounds: Normal breath sounds. Abdominal:      Tenderness: There is no abdominal tenderness. There is no right CVA tenderness or left CVA tenderness. Genitourinary:     Comments: Scrotal incision with edges well approximated and sutures intact. Drain to left scrotum with small amount of serosanguinous drainage. Suture to drain cut and removed in entirety and drain removed intact. Minimal serosanguinous drainage present. Musculoskeletal: Normal range of motion. Skin:     General: Skin is warm and dry. Neurological:      Mental Status: He is alert and oriented to person, place, and time. Mental status is at baseline. Psychiatric:         Mood and Affect: Mood normal.         Behavior: Behavior normal.         Thought Content:  Thought content normal.         POC  Results for POC orders placed in visit on 12/10/20   POCT Urinalysis No Micro (Auto)   Result Value Ref Range    Glucose, Ur Negative NEGATIVE mg/dl    Bilirubin Urine Negative     Ketones, Urine Negative NEGATIVE    Specific Gravity, Urine >= 1.030 1.002 - 1.030    Blood, UA POC Small (A) NEGATIVE    pH, Urine 7.00 5.0 - 9.0    Protein, Urine 30 (A) NEGATIVE mg/dl    Urobilinogen, Urine 0.20 0.0 - 1.0 eu/dl    Nitrite, Urine Negative NEGATIVE    Leukocyte Clumps, Urine Negative NEGATIVE    Color, Urine Yellow YELLOW-STRAW    Character, Urine Clear [id number].[id number]         Patients recent PSA values are as follows  Lab Results   Component Value Date    PSA <0.02 11/26/2019    PSA <0.02 11/12/2018    PSA <0.02 11/15/2017        Recent BUN/Creatinine:  Lab Results   Component Value Date    BUN 25 12/05/2020    CREATININE 0.7 12/05/2020       Pathology  FINAL DIAGNOSIS:   Left testicle, orchiectomy:    Acute orchitis and epididymitis with necrosis. Assessment:   Ruptured testicle s/p L orchiectomy  Sepsis    Plan:     Scrotal incision with edges well approximated. Scrotal swelling nearly resolved. Drain removed today in office without complication. Pt to continue antibiotics to completion and f/u in the office in 4 weeks for incision check. Call if any worsening or change in symptoms prior.

## 2020-12-11 ASSESSMENT — ENCOUNTER SYMPTOMS
ABDOMINAL PAIN: 0
BACK PAIN: 0

## 2020-12-12 ASSESSMENT — ENCOUNTER SYMPTOMS: SHORTNESS OF BREATH: 0

## 2021-01-04 ENCOUNTER — TELEPHONE (OUTPATIENT)
Dept: CARDIOLOGY CLINIC | Age: 71
End: 2021-01-04

## 2021-01-04 NOTE — TELEPHONE ENCOUNTER
Spoke with Renetta Michelle. We currently do not have any samples for Eliquis. 7 day supply sent to pharmacy to get pt to appt.

## 2021-01-04 NOTE — TELEPHONE ENCOUNTER
Darren Guthrie called requesting a refill on the following medications:  Requested Prescriptions     Pending Prescriptions Disp Refills    amiodarone (CORDARONE) 200 MG tablet 30 tablet 0     Sig: Take 1 tablet by mouth daily    metoprolol tartrate (LOPRESSOR) 25 MG tablet 60 tablet 0     Sig: Take 1 tablet by mouth 2 times daily     [facility]   . pv      Date of last visit:   Date of next visit (if applicable): Visit date not found

## 2021-01-04 NOTE — TELEPHONE ENCOUNTER
DONV=01-12-21. Wife Selvin Ordonez is calling to request samples of Eliquis 5mg bid, he saw emelia in hospital, Rx originally prescribed by Camelia Bey office said to contact heart doctor. Asking for samples due to Eliquis being very expensive and not sure if will stay on same dosage, only requesing samples until his appt 01-12-21. Please call wife back at [phone].

## 2021-01-06 RX ORDER — AMIODARONE HYDROCHLORIDE 200 MG/1
200 TABLET ORAL DAILY
Qty: 30 TABLET | Refills: 0 | Status: CANCELLED | OUTPATIENT
Start: 2021-01-06

## 2021-01-06 RX ORDER — AMIODARONE HYDROCHLORIDE 200 MG/1
200 TABLET ORAL DAILY
Qty: 30 TABLET | Refills: 0 | Status: SHIPPED | OUTPATIENT
Start: 2021-01-06 | End: 2021-01-06 | Stop reason: SDUPTHER

## 2021-01-06 NOTE — TELEPHONE ENCOUNTER
Yonis Barrera called to check on these prescriptions.  She was advised that they were sent to [facility] in [place] and she stated that they were supposed to go to [address] [place] [address], can the pharmacy be changed on this request?  Please advise  [phone]

## 2021-01-07 RX ORDER — AMIODARONE HYDROCHLORIDE 200 MG/1
200 TABLET ORAL DAILY
Qty: 30 TABLET | Refills: 0 | Status: SHIPPED | OUTPATIENT
Start: 2021-01-07 | End: 2021-01-12 | Stop reason: SDUPTHER

## 2021-01-08 ENCOUNTER — OFFICE VISIT (OUTPATIENT)
Dept: UROLOGY | Age: 71
End: 2021-01-08
Payer: MEDICARE

## 2021-01-08 VITALS — HEIGHT: 72 IN | TEMPERATURE: 96.6 F | WEIGHT: 199.5 LBS | BODY MASS INDEX: 27.02 KG/M2

## 2021-01-08 DIAGNOSIS — R31.29 MICROSCOPIC HEMATURIA: ICD-10-CM

## 2021-01-08 DIAGNOSIS — N45.2 ORCHITIS: Primary | ICD-10-CM

## 2021-01-08 LAB
BILIRUBIN URINE: NEGATIVE
BLOOD URINE, POC: ABNORMAL
CHARACTER, URINE: CLEAR
COLOR, URINE: YELLOW
GLUCOSE URINE: NEGATIVE MG/DL
KETONES, URINE: NEGATIVE
LEUKOCYTE CLUMPS, URINE: NEGATIVE
NITRITE, URINE: NEGATIVE
PH, URINE: 6 (ref 5–9)
PROTEIN, URINE: NEGATIVE MG/DL
SPECIFIC GRAVITY, URINE: 1.02 (ref 1–1.03)
UROBILINOGEN, URINE: 0.2 EU/DL (ref 0–1)

## 2021-01-08 PROCEDURE — 81003 URINALYSIS AUTO W/O SCOPE: CPT | Performed by: NURSE PRACTITIONER

## 2021-01-08 PROCEDURE — 99024 POSTOP FOLLOW-UP VISIT: CPT | Performed by: NURSE PRACTITIONER

## 2021-01-08 ASSESSMENT — ENCOUNTER SYMPTOMS
ABDOMINAL PAIN: 0
BACK PAIN: 0

## 2021-01-08 NOTE — PROGRESS NOTES
 Probiotic Product (PROBIOTIC DAILY PO) Take 1 tablet by mouth daily       Ascorbic Acid (VITAMIN C) 500 MG tablet Take 500 mg by mouth daily.  fish oil-omega-3 fatty acids 1000 MG capsule Take 1 g by mouth daily       Coenzyme Q10 (CO Q 10) 100 MG CAPS Take 200 mg by mouth daily. No current facility-administered medications for this visit. Past Medical History  Radha Lopez  has a past medical history of Gout, Hyperlipidemia, Hypertension, and Prostate cancer ([address] Past Surgical History  The patient  has a past surgical history that includes Total hip arthroplasty (); Prostate surgery (2010); joint replacement; Tonsillectomy; Colonoscopy; and pr explore scrotum (Left, 12/3/2020). Family History  This patient's family history includes Cancer (age of onset: 80) in his father and mother. Social History  Radha Lopez  reports that he quit smoking about 4 years ago. His smoking use included cigarettes. He started smoking about 41 years ago. He has a 7.50 pack-year smoking history. He has never used smokeless tobacco. He reports current alcohol use. He reports that he does not use drugs. Subjective:      Review of Systems   Constitutional: Negative for activity change, appetite change, chills, diaphoresis, fatigue, fever and unexpected weight change. Gastrointestinal: Negative for abdominal pain. Genitourinary: Negative for decreased urine volume, difficulty urinating, dysuria, flank pain, frequency and urgency. Musculoskeletal: Negative for back pain. Objective:   Temp 96.6 °F (35.9 °C) (Temporal)   Ht 6' (1.829 m)   Wt 199 lb 8 oz (90.5 kg)   BMI 27.06 kg/m²     Physical Exam  Constitutional:       General: He is not in acute distress. Appearance: Normal appearance. He is not ill-appearing or diaphoretic. HENT:      Head: Normocephalic and atraumatic.       Right Ear: External ear normal.      Left Ear: External ear normal.      Nose: Nose normal. Mouth/Throat:      Mouth: Mucous membranes are moist.   Eyes:      General: No scleral icterus. Right eye: No discharge. Left eye: No discharge. Pulmonary:      Effort: Pulmonary effort is normal. No respiratory distress. Genitourinary:     Comments: Incision with edges well approximated. No drainage. Nodular area in left inguinal region decreasing in size. No significant scrotal swelling. Neurological:      Mental Status: He is alert. POC  No results found for this visit on 01/08/21. Patients recent PSA values are as follows  Lab Results   Component Value Date    PSA <0.02 11/26/2019    PSA <0.02 11/12/2018    PSA <0.02 11/15/2017        Recent BUN/Creatinine:  Lab Results   Component Value Date    BUN 25 12/05/2020    CREATININE 0.7 12/05/2020     Pathology  FINAL DIAGNOSIS:   Left testicle, orchiectomy:    Acute orchitis and epididymitis with necrosis. Assessment:   L orchiectomy s/p ruptured testicle  Recent sepsis  Hx of prostate cancer s/p EBRT and brachytherapy 2010    Plan:     Pt is healing well from recent surgery. [id number] [address] for return to activity with use of scrotal support. Pt counseled to back off on any activity that causes pain. Call the office for any increased swelling, pain, fever/chills, drainage from incision site. Pt has blood on urine dip in office today. Hx of long-standing micro hematuria. No gross hematuria. CT pelvis 12/2 with thick-walled bladder. Send urine for culture--call results. Pt due for psa to complete 10 years of surveillance for prostate cancer. Last PSA 2019 undetectable showing excellent prostate cancer control. Reports has order from pcp for January. Will likely continue to check yearly psa through pcp for peace of mind. F/u PRN. Call urine culture results.

## 2021-01-10 ENCOUNTER — TELEPHONE (OUTPATIENT)
Dept: UROLOGY | Age: 71
End: 2021-01-10

## 2021-01-10 DIAGNOSIS — R31.29 MICROSCOPIC HEMATURIA: Primary | ICD-10-CM

## 2021-01-10 NOTE — TELEPHONE ENCOUNTER
Please let pt know urine culture negative for infection. Urine microscopy significant for microscopic blood. Pt has a hx of microscopic blood with prior negative workup in 2012. We discussed his microscopic blood at recent office visit. Given hx of radiation, age, last cysto more than 5 years ago recommend considering repeat office cystoscopy at this time. If he is agreeable please schedule with Dr. Carmela Laurent. If he wants to hold off at this time recommend at least checking urine cytology and he will need to notify the office immediately of any gross hematuria.

## 2021-01-11 ENCOUNTER — HOSPITAL ENCOUNTER (OUTPATIENT)
Age: 71
Discharge: HOME | End: 2021-01-11
Payer: MEDICARE

## 2021-01-11 DIAGNOSIS — R31.29 MICROSCOPIC HEMATURIA: ICD-10-CM

## 2021-01-11 PROCEDURE — 88112 CYTOPATH CELL ENHANCE TECH: CPT

## 2021-01-11 NOTE — TELEPHONE ENCOUNTER
Patient advised of the lab results. He declined the cystoscopy at this time. Order placed for cytology. He will give the urine specimen to the lab and call the office for any gross hematuria.

## 2021-01-12 ENCOUNTER — HOSPITAL ENCOUNTER (OUTPATIENT)
Dept: NON INVASIVE DIAGNOSTICS | Age: 71
Discharge: HOME | End: 2021-01-12
Payer: MEDICARE

## 2021-01-12 ENCOUNTER — OFFICE VISIT (OUTPATIENT)
Dept: CARDIOLOGY CLINIC | Age: 71
End: 2021-01-12
Payer: MEDICARE

## 2021-01-12 VITALS
WEIGHT: 201.4 LBS | SYSTOLIC BLOOD PRESSURE: 128 MMHG | HEIGHT: 72 IN | BODY MASS INDEX: 27.28 KG/M2 | DIASTOLIC BLOOD PRESSURE: 80 MMHG | HEART RATE: 76 BPM

## 2021-01-12 DIAGNOSIS — I48.91 ATRIAL FIBRILLATION, NEW ONSET (HCC): ICD-10-CM

## 2021-01-12 DIAGNOSIS — I10 ESSENTIAL HYPERTENSION: ICD-10-CM

## 2021-01-12 DIAGNOSIS — I48.91 ATRIAL FIBRILLATION, NEW ONSET (HCC): Primary | ICD-10-CM

## 2021-01-12 DIAGNOSIS — E78.2 MIXED HYPERCHOLESTEROLEMIA AND HYPERTRIGLYCERIDEMIA: ICD-10-CM

## 2021-01-12 PROCEDURE — 4040F PNEUMOC VAC/ADMIN/RCVD: CPT | Performed by: NURSE PRACTITIONER

## 2021-01-12 PROCEDURE — 93000 ELECTROCARDIOGRAM COMPLETE: CPT | Performed by: NURSE PRACTITIONER

## 2021-01-12 PROCEDURE — 99213 OFFICE O/P EST LOW 20 MIN: CPT | Performed by: NURSE PRACTITIONER

## 2021-01-12 PROCEDURE — G8427 DOCREV CUR MEDS BY ELIG CLIN: HCPCS | Performed by: NURSE PRACTITIONER

## 2021-01-12 PROCEDURE — 93270 REMOTE 30 DAY ECG REV/REPORT: CPT

## 2021-01-12 PROCEDURE — G8482 FLU IMMUNIZE ORDER/ADMIN: HCPCS | Performed by: NURSE PRACTITIONER

## 2021-01-12 PROCEDURE — 3017F COLORECTAL CA SCREEN DOC REV: CPT | Performed by: NURSE PRACTITIONER

## 2021-01-12 PROCEDURE — 1036F TOBACCO NON-USER: CPT | Performed by: NURSE PRACTITIONER

## 2021-01-12 PROCEDURE — 1123F ACP DISCUSS/DSCN MKR DOCD: CPT | Performed by: NURSE PRACTITIONER

## 2021-01-12 PROCEDURE — G8417 CALC BMI ABV UP PARAM F/U: HCPCS | Performed by: NURSE PRACTITIONER

## 2021-01-12 RX ORDER — AMIODARONE HYDROCHLORIDE 200 MG/1
200 TABLET ORAL DAILY
Qty: 30 TABLET | Refills: 2 | Status: SHIPPED
Start: 2021-01-12 | End: 2021-02-23 | Stop reason: ALTCHOICE

## 2021-01-12 NOTE — PROCEDURES
The skin was prepped and a 30 day cardiac event monitor was applied. The patient was instructed on the documentation of symptoms and the purpose of the monitor as well as the things to avoid while wearing the monitor. The patient was instructed to remove and return the monitor on 02/11/2021.   The serial number of the monitor that was applied is [id number]

## 2021-01-12 NOTE — PROGRESS NOTES
[facility] PROFESSIONAL SERVICES  [facility]   [address]   [place] [address]   Dept: [phone]   Dept Fax: [phone]: [phone]      Chief Complaint   Patient presents with   [address] from Hospital     Follow up for new onset AFB/AFL s/p MONIQUE/CV during recent hospitalization for left testicular contusion/infection s/p left orchiectomy in 73 y/o with history of HTN, HLD, prostate cancer. EKG today SR, no acute abnormalities. On eliquis, amiodarone, metoprolol. HR has been controlled at home. Denies chest pain, palpitations, sob, VERÓNICA, lightheadedness, dizziness or syncope. Cardiologist:  Dr. Lalitha Valverde:   No fever, no chills, No fatigue or weight loss  Pulmonary:    No dyspnea, no wheezing  Cardiac:    Denies recent chest pain   GI:     No nausea or vomiting, no abdominal pain  Neuro:    No dizziness or light headedness  Musculoskeletal:  No recent active issues  Extremities:   No edema, good peripheral pulses      Past Medical History:   Diagnosis Date    Gout     Hyperlipidemia     Hypertension     Prostate cancer ([facility] 8/9/2011      External Beam radiation + Intersititial Brachytherapy for Delicia score 4+3, PSA 3.6, stage T2A. Adenocarcinoma of the Prostate completed in October 2010. No Known Allergies    Current Outpatient Medications   Medication Sig Dispense Refill    amiodarone (CORDARONE) 200 MG tablet Take 1 tablet by mouth daily 30 tablet 2    metoprolol tartrate (LOPRESSOR) 25 MG tablet Take 1 tablet by mouth 2 times daily 180 tablet 3    apixaban (ELIQUIS) 5 MG TABS tablet Take 1 tablet by mouth 2 times daily 180 tablet 1    ALPRAZolam (XANAX) 0.25 MG tablet Take 0.25 mg by mouth nightly as needed for Sleep.       zinc sulfate (ZINCATE) 220 (50 Zn) MG capsule Take 50 mg by mouth daily      simvastatin (ZOCOR) 10 MG tablet Take 1 tablet by mouth nightly 90 tablet 3    lisinopril-hydroCHLOROthiazide (PRINZIDE;ZESTORETIC) 20-25 MG per tablet Take 1 tablet by mouth daily 90 tablet 3    Glucosamine-Chondroit-Vit C-Mn (GLUCOSAMINE 1500 COMPLEX PO) Take 1 capsule by mouth daily       Probiotic Product (PROBIOTIC DAILY PO) Take 1 tablet by mouth daily       Ascorbic Acid (VITAMIN C) 500 MG tablet Take 500 mg by mouth daily.  fish oil-omega-3 fatty acids 1000 MG capsule Take 1 g by mouth daily       Coenzyme Q10 (CO Q 10) 100 MG CAPS Take 200 mg by mouth daily. No current facility-administered medications for this visit. Social History     Socioeconomic History    Marital status:      Spouse name: Leidy Rater Number of children: 1    Years of education: None    Highest education level: None   Occupational History    None   Social Needs    Financial resource strain: Not hard at all   Tablus-Lily insecurity     Worry: Never true     Inability: Never true    Transportation needs     Medical: No     Non-medical: No   Tobacco Use    Smoking status: Former Smoker     Packs/day: 0.50     Years: 15.00     Pack years: 7.50     Types: Cigarettes     Start date: 1980     Quit date: 2017     Years since quittin.0    Smokeless tobacco: Never Used    Tobacco comment: 10 cigarettes per day   Substance and Sexual Activity    Alcohol use:  Yes     Alcohol/week: 0.0 standard drinks    Drug use: Never    Sexual activity: Yes   Lifestyle    Physical activity     Days per week: None     Minutes per session: None    Stress: None   Relationships    Social connections     Talks on phone: None     Gets together: None     Attends [employer] service: None     Active member of club or organization: None     Attends meetings of clubs or organizations: None     Relationship status: None    Intimate partner violence     Fear of current or ex partner: None     Emotionally abused: None     Physically abused: None     Forced sexual activity: None   Other Topics Concern    None   Social History Narrative    None       Family History   Problem Relation Age of Onset    Cancer Mother 80        Pancreas,  CHF, DM    Cancer Father 80        Lung cancer, COPD    Prostate Cancer Neg Hx        Blood pressure 128/80, pulse 76, height 6' (1.829 m), weight 201 lb 6.4 oz (91.4 kg). General:   Well developed, well nourished  Lungs:   Clear to auscultation  Heart:    Normal S1 S2, No murmur, rubs, or gallops  Abdomen:   Soft, non tender, no organomegalies, positive bowel sounds  Extremities:   No edema, no cyanosis, good peripheral pulses  Neurological:   Awake, alert, oriented. No obvious focal deficits  Musculoskeletal:  No obvious deformities    EKG:   SR, no acute abnormalities    Echo: 12/4/20  Summary   Normal left ventricle size and systolic function. Ejection fraction was   estimated at 55%. There were no regional left ventricular wall motion   abnormalities and wall thickness was within normal limits. Mild mitral regurgitation. Trace tricuspid regurgitation. Signature      ----------------------------------------------------------------   Electronically signed by Luciano Bundy MD    Diagnosis Orders   1. Atrial fibrillation, new onset ([address]  EKG 12 Lead    Cardiac event monitor   2. Essential hypertension     3. Mixed hypercholesterolemia and hypertriglyceridemia         Orders Placed This Encounter   Procedures    Cardiac event monitor     Standing Status:   Future     Number of Occurrences:   1     Standing Expiration Date:   1/12/2022     Scheduling Instructions:      30 day event monitor for AFB/AFL burden. Dr. Leeanna Rodriguez patient.  EKG 12 Lead     Order Specific Question:   Reason for Exam?     Answer: Other   New onset AFB/AFL RVR s/p MONIQUE/DCCV with return to SR. EKG in office today SR. No prior history of AFB/AFL or palpitations. [id number] score 2. On amiodarone, lopressor, eliquis    ?  Isolated incident R/T sepsis  Patient does not want to take AAR drugs or [address] unless

## 2021-02-16 NOTE — PROCEDURES
[address] [place] [address]                                 EVENT MONITOR    PATIENT NAME: Yi Salcedo                    :        1950  MED REC NO:   [id number]                           ROOM:  ACCOUNT NO:   [de-identified]                           ADMIT DATE: 2021  PROVIDER:     Shahab Maldonado MD    TEST TYPE:  30-day cardiac event monitor. DATE OF ENROLLMENT:  2021 until 02/10/2021. INDICATION:  Arrhythmia, atrial fibrillation suspected, palpitations. FINDINGS:  The patient's monitoring period was between 2021 and  02/10/2021. The underlying baseline rhythm was consistent with normal  sinus rhythm with an average heart rate of 70 beats per minute. No  evidence for atrial fibrillation detected during this monitoring. Occasional PVCs. Occasional underlying artifact. One run of regular  narrow complex tachycardia most consistent with supraventricular  tachycardia. This run lasted for one minute. IMPRESSION:  1. Normal sinus rhythm. 2.  One run of narrow complex tachycardia, most consistent with  paroxysmal supraventricular tachycardia. No correlation with symptoms.         Rondi Gitelman, MD    D: 02/15/2021 14:13:35       T: 02/15/2021 14:28:13     [email or web address]  Job#: [id number]     Doc#: [id number]    CC:

## 2021-02-17 NOTE — BRIEF OP NOTE
[address] [address]  Sedation/Analgesia Post Sedation Record    Pt Name: Saumya Oconnor  Account number: [de-identified]  MRN: [id number]  YOB: 1950  Procedure Performed By: Mehul Fortune MD   Primary Care Physician: David Farrar MD  Date: 2021    POST-PROCEDURE               CARDIAC CATHETERIZATION     PATIENT NAME: Terrence Vale                    :        1950  MED REC NO:   [id number]                           ROOM:       0016  ACCOUNT NO:   [de-identified]                           ADMIT DATE: 2020  PROVIDER:     Mehul Fortune MD     DATE OF PROCEDURE:  2020     MONIQUE-GUIDED DC CARDIOVERSION     INDICATION:  New-onset atrial fibrillation. The patient has atrial  fibrillation with rapid ventricular response, resistant to medical  therapy including amiodarone drip. He has had hypotension and low blood  pressure, required IV fluid boluses. The patient reports palpitations.     PROCEDURE PERFORMED:  MONIQUE-guided DC cardioversion.     DESCRIPTION OF PROCEDURE:  After informed consent, the patient was  brought to the cardiac catheterization room. Transesophageal  echocardiogram was completed, revealed no evidence for intracardiac  thrombus. The patient was sedated with Versed and fentanyl. Complete  MONIQUE report will be provided separately. He was given synchronized  cardioversion, 300 joules. Afterwards, he converted to normal sinus  rhythm.     MEDICATIONS:  See MAR.     COMPLICATIONS:  None.     ESTIMATED BLOOD LOSS:  Zero.     CONCLUSION:  Successful MONIQUE-guided DC cardioversion.     RECOMMENDATIONS:  1. Continue amiodarone drip until tomorrow morning. 2.  Start amiodarone 200 mg p.o. daily in the morning. 3.  Continue Lopressor.   4.  Continue on heparin drip.           MD Mehul Cowart MD, Kenney Sanders   Electronically signed 2021 at 8:22 PM  Interventional Cardiology

## 2021-02-23 ENCOUNTER — OFFICE VISIT (OUTPATIENT)
Dept: CARDIOLOGY CLINIC | Age: 71
End: 2021-02-23
Payer: MEDICARE

## 2021-02-23 VITALS
WEIGHT: 206.2 LBS | BODY MASS INDEX: 27.93 KG/M2 | HEART RATE: 66 BPM | DIASTOLIC BLOOD PRESSURE: 80 MMHG | SYSTOLIC BLOOD PRESSURE: 139 MMHG | HEIGHT: 72 IN

## 2021-02-23 DIAGNOSIS — E78.5 DYSLIPIDEMIA: Primary | ICD-10-CM

## 2021-02-23 PROCEDURE — 3017F COLORECTAL CA SCREEN DOC REV: CPT | Performed by: INTERNAL MEDICINE

## 2021-02-23 PROCEDURE — 4040F PNEUMOC VAC/ADMIN/RCVD: CPT | Performed by: INTERNAL MEDICINE

## 2021-02-23 PROCEDURE — G8417 CALC BMI ABV UP PARAM F/U: HCPCS | Performed by: INTERNAL MEDICINE

## 2021-02-23 PROCEDURE — 1123F ACP DISCUSS/DSCN MKR DOCD: CPT | Performed by: INTERNAL MEDICINE

## 2021-02-23 PROCEDURE — G8427 DOCREV CUR MEDS BY ELIG CLIN: HCPCS | Performed by: INTERNAL MEDICINE

## 2021-02-23 PROCEDURE — 99214 OFFICE O/P EST MOD 30 MIN: CPT | Performed by: INTERNAL MEDICINE

## 2021-02-23 PROCEDURE — 1036F TOBACCO NON-USER: CPT | Performed by: INTERNAL MEDICINE

## 2021-02-23 PROCEDURE — G8482 FLU IMMUNIZE ORDER/ADMIN: HCPCS | Performed by: INTERNAL MEDICINE

## 2021-02-23 RX ORDER — ASPIRIN 81 MG/1
81 TABLET ORAL DAILY
Qty: 90 TABLET | Refills: 1 | Status: SHIPPED | OUTPATIENT
Start: 2021-02-23 | End: 2022-11-02 | Stop reason: SDUPTHER

## 2021-02-23 NOTE — PROGRESS NOTES
  amiodarone (CORDARONE) 200 MG tablet, Take 1 tablet by mouth daily, Disp: 30 tablet, Rfl: 2    metoprolol tartrate (LOPRESSOR) 25 MG tablet, Take 1 tablet by mouth 2 times daily, Disp: 180 tablet, Rfl: 3    apixaban (ELIQUIS) 5 MG TABS tablet, Take 1 tablet by mouth 2 times daily, Disp: 180 tablet, Rfl: 1    ALPRAZolam (XANAX) 0.25 MG tablet, Take 0.25 mg by mouth nightly as needed for Sleep., Disp: , Rfl:     zinc sulfate (ZINCATE) 220 (50 Zn) MG capsule, Take 50 mg by mouth daily, Disp: , Rfl:     simvastatin (ZOCOR) 10 MG tablet, Take 1 tablet by mouth nightly, Disp: 90 tablet, Rfl: 3    lisinopril-hydroCHLOROthiazide (PRINZIDE;ZESTORETIC) 20-25 MG per tablet, Take 1 tablet by mouth daily, Disp: 90 tablet, Rfl: 3    Glucosamine-Chondroit-Vit C-Mn (GLUCOSAMINE 1500 COMPLEX PO), Take 1 capsule by mouth daily , Disp: , Rfl:     Probiotic Product (PROBIOTIC DAILY PO), Take 1 tablet by mouth daily , Disp: , Rfl:     Ascorbic Acid (VITAMIN C) 500 MG tablet, Take 500 mg by mouth daily. , Disp: , Rfl:     fish oil-omega-3 fatty acids 1000 MG capsule, Take 1 g by mouth daily , Disp: , Rfl:     Coenzyme Q10 (CO Q 10) 100 MG CAPS, Take 200 mg by mouth daily. , Disp: , Rfl:     Past Medical History  Marcie Griffin  has a past medical history of Gout, Hyperlipidemia, Hypertension, and Prostate cancer ([facility] [address] Social History  Marcie Griffin  reports that he quit smoking about 4 years ago. His smoking use included cigarettes. He started smoking about 41 years ago. He has a 7.50 pack-year smoking history. He has never used smokeless tobacco. He reports current alcohol use. He reports that he does not use drugs. Family History  Marcie Griffin family history includes Cancer (age of onset: 80) in his father and mother.     Past Surgical History   Past Surgical History:   Procedure Laterality Date    COLONOSCOPY      JOINT REPLACEMENT      right hip    IL EXPLORE SCROTUM Left 12/3/2020    LEFT TESTICLE  EXPLORATION AND [address] OUT deficit. Coordination normal.   Skin: Skin is warm and dry. Psychiatric: Normal mood and affect. No results found for: CKTOTAL, CKMB, CKMBINDEX    Lab Results   Component Value Date    WBC 10.0 12/06/2020    RBC 3.87 12/06/2020    HGB 12.2 12/06/2020    HCT 36.9 12/06/2020    MCV 95.3 12/06/2020    MCH 31.5 12/06/2020    MCHC 33.1 12/06/2020    RDW 13.3 12/02/2020     12/06/2020    MPV 9.9 12/06/2020       Lab Results   Component Value Date     12/05/2020    K 4.1 12/05/2020     12/05/2020    CO2 23 12/05/2020    BUN 25 12/05/2020    LABALBU 4.3 10/01/2020    CREATININE 0.7 12/05/2020    CALCIUM 8.0 12/05/2020    LABGLOM >90 12/05/2020    GLUCOSE 142 12/05/2020    GLUCOSE 109 11/23/2016       Lab Results   Component Value Date    ALKPHOS 49 10/01/2020    ALT 28 10/01/2020    AST 26 10/01/2020    PROT 7.1 10/01/2020    BILITOT 0.9 10/01/2020    LABALBU 4.3 10/01/2020       Lab Results   Component Value Date    MG 2.3 12/05/2020       No results found for: INR, PROTIME      Lab Results   Component Value Date    LABA1C 6.5 10/01/2020       Lab Results   Component Value Date    TRIG 215 10/01/2020    HDL 46 10/01/2020    LDLCALC 126 10/01/2020    LABVLDL 34 11/23/2016       Lab Results   Component Value Date    TSH 0.378 12/03/2020         Testing Reviewed:      I have individually reviewed the cardiac test below:    ECHO: No results found for this or any previous visit. TEST TYPE:  30-day cardiac event monitor.     DATE OF ENROLLMENT:  01/12/2021 until 02/10/2021.     INDICATION:  Arrhythmia, atrial fibrillation suspected, palpitations.     FINDINGS:  The patient's monitoring period was between 01/12/2021 and  02/10/2021. The underlying baseline rhythm was consistent with normal  sinus rhythm with an average heart rate of 70 beats per minute. No  evidence for atrial fibrillation detected during this monitoring. Occasional PVCs. Occasional underlying artifact.   One run of regular  narrow complex tachycardia most consistent with supraventricular  tachycardia. This run lasted for one minute.     IMPRESSION:  1. Normal sinus rhythm. 2.  One run of narrow complex tachycardia, most consistent with  paroxysmal supraventricular tachycardia. No correlation with symptoms.       AssessmentPlan:   Kip Sotelo is a pleasant 80year old male patient who  has a past medical history of Gout, Hyperlipidemia, Hypertension, and Prostate cancer ([facility] [address] On 12/2020, the patient was admitted for sepsis, testicular contusion, required left orchiectomy. During his hospital stay, the patient was diagnosed with atrial flutter RVR, underwent MONIQUE guided DCCV. He was discharged home on Eliquis. He was subsequently seen in office, event monitor was ordered to assess possibility of stopping Eliquis. Cardiac event monitor 01/2021 revealed NSR and only one 1 minute run of narrow complex tachycardia/SVT The patient feels well. Patient denies chest pain, shortness of breath, dyspnea on exertion, orthopnea, paroxysmal nocturnal dyspnea, palpitations, dizziness, syncope, weight gain or leg swelling. 1. Post operative AF *1 episode   2. Paroxysmal atrial flutter (diagnosed post orchiectomy 12/2020)  3. Chadsvasc score 2  4. Cardiac event monitor (only one minute run of SVT detected)   5.  HTN     BP is 156/89   Patient reports possible white coat HTN   Home BP readings are well controlled, log reviewed with patient    Advised to continue to monitor home BP, call office if high   No significant AF on 30 day cardiac event monitor    only one 1 minute run of narrow complex tachycardia/SVT   Patient denies any symptoms to suggest recurrent AF   He reports chronic easy bruising    D/W patient and family, they wish to proceed with attempt to stop Amiodarone and to stop Eliquis   Will stop eliquis   Stop amiodarone   Cont Metoprolol    ASA 81 mg po daily   Patient was instructed to call office if he has any symptoms to suggest recurrent AF         Above findings and plan of care were discussed with patient in details, patient's questions were answered.      Disposition:  RTC in 6 MONTHS             Electronically signed by Shahab Maldonado MD, [address], [place]    2/23/2021 at 2:21 PM EST

## 2021-03-25 ENCOUNTER — TELEPHONE (OUTPATIENT)
Dept: FAMILY MEDICINE CLINIC | Age: 71
End: 2021-03-25

## 2021-03-25 NOTE — TELEPHONE ENCOUNTER
Mario Gifford stopped into the office he is requesting an order for a PSA. He has a order for lipid panel he'd like to do this at same time.  Next appt with Dr Terri Ang  03/31/21 at 9 am. Please advise

## 2021-03-26 ENCOUNTER — HOSPITAL ENCOUNTER (OUTPATIENT)
Age: 71
Discharge: HOME | End: 2021-03-26
Payer: MEDICARE

## 2021-03-26 DIAGNOSIS — Z12.5 SCREENING FOR PROSTATE CANCER: ICD-10-CM

## 2021-03-26 DIAGNOSIS — E78.5 DYSLIPIDEMIA: ICD-10-CM

## 2021-03-26 DIAGNOSIS — E11.9 TYPE 2 DIABETES MELLITUS WITHOUT COMPLICATION, WITHOUT LONG-TERM CURRENT USE OF INSULIN (HCC): ICD-10-CM

## 2021-03-26 LAB
CHOLESTEROL, TOTAL: 170 MG/DL (ref 100–199)
HDLC SERPL-MCNC: 47 MG/DL
LDL CHOLESTEROL CALCULATED: 91 MG/DL
TRIGL SERPL-MCNC: 160 MG/DL (ref 0–199)

## 2021-03-26 PROCEDURE — 36415 COLL VENOUS BLD VENIPUNCTURE: CPT

## 2021-03-26 PROCEDURE — 83036 HEMOGLOBIN GLYCOSYLATED A1C: CPT

## 2021-03-26 PROCEDURE — 80061 LIPID PANEL: CPT

## 2021-03-26 PROCEDURE — G0103 PSA SCREENING: HCPCS

## 2021-03-27 LAB
AVERAGE GLUCOSE: 123 MG/DL (ref 70–126)
HBA1C MFR BLD: 6.1 % (ref 4.4–6.4)
PROSTATE SPECIFIC ANTIGEN: < 0.02 NG/ML (ref 0–1)

## 2021-03-31 ENCOUNTER — OFFICE VISIT (OUTPATIENT)
Dept: FAMILY MEDICINE CLINIC | Age: 71
End: 2021-03-31
Payer: MEDICARE

## 2021-03-31 VITALS
OXYGEN SATURATION: 99 % | SYSTOLIC BLOOD PRESSURE: 130 MMHG | DIASTOLIC BLOOD PRESSURE: 74 MMHG | HEART RATE: 76 BPM | TEMPERATURE: 97.3 F | BODY MASS INDEX: 28.21 KG/M2 | WEIGHT: 208 LBS

## 2021-03-31 DIAGNOSIS — I10 ESSENTIAL HYPERTENSION: Primary | ICD-10-CM

## 2021-03-31 DIAGNOSIS — E11.9 TYPE 2 DIABETES MELLITUS WITHOUT COMPLICATION, WITHOUT LONG-TERM CURRENT USE OF INSULIN (HCC): ICD-10-CM

## 2021-03-31 DIAGNOSIS — Z13.6 SCREENING FOR AAA (ABDOMINAL AORTIC ANEURYSM): ICD-10-CM

## 2021-03-31 DIAGNOSIS — Z85.46 H/O PROSTATE CANCER: ICD-10-CM

## 2021-03-31 DIAGNOSIS — F51.05 INSOMNIA SECONDARY TO ANXIETY: ICD-10-CM

## 2021-03-31 DIAGNOSIS — I48.91 ATRIAL FIBRILLATION WITH RVR (HCC): ICD-10-CM

## 2021-03-31 DIAGNOSIS — E78.2 MIXED HYPERCHOLESTEROLEMIA AND HYPERTRIGLYCERIDEMIA: ICD-10-CM

## 2021-03-31 DIAGNOSIS — F41.9 INSOMNIA SECONDARY TO ANXIETY: ICD-10-CM

## 2021-03-31 PROBLEM — A41.9 SEPSIS (HCC): Status: RESOLVED | Noted: 2020-12-02 | Resolved: 2021-03-31

## 2021-03-31 PROCEDURE — G8417 CALC BMI ABV UP PARAM F/U: HCPCS | Performed by: FAMILY MEDICINE

## 2021-03-31 PROCEDURE — G8427 DOCREV CUR MEDS BY ELIG CLIN: HCPCS | Performed by: FAMILY MEDICINE

## 2021-03-31 PROCEDURE — 99214 OFFICE O/P EST MOD 30 MIN: CPT | Performed by: FAMILY MEDICINE

## 2021-03-31 PROCEDURE — 2022F DILAT RTA XM EVC RTNOPTHY: CPT | Performed by: FAMILY MEDICINE

## 2021-03-31 PROCEDURE — 1036F TOBACCO NON-USER: CPT | Performed by: FAMILY MEDICINE

## 2021-03-31 PROCEDURE — G8482 FLU IMMUNIZE ORDER/ADMIN: HCPCS | Performed by: FAMILY MEDICINE

## 2021-03-31 PROCEDURE — 4040F PNEUMOC VAC/ADMIN/RCVD: CPT | Performed by: FAMILY MEDICINE

## 2021-03-31 PROCEDURE — 3044F HG A1C LEVEL LT 7.0%: CPT | Performed by: FAMILY MEDICINE

## 2021-03-31 PROCEDURE — 3017F COLORECTAL CA SCREEN DOC REV: CPT | Performed by: FAMILY MEDICINE

## 2021-03-31 PROCEDURE — 1123F ACP DISCUSS/DSCN MKR DOCD: CPT | Performed by: FAMILY MEDICINE

## 2021-03-31 RX ORDER — ALPRAZOLAM 0.25 MG/1
0.25 TABLET ORAL NIGHTLY PRN
Qty: 30 TABLET | Refills: 0 | Status: CANCELLED | OUTPATIENT
Start: 2021-03-31 | End: 2021-04-30

## 2021-03-31 RX ORDER — SILDENAFIL 100 MG/1
100 TABLET, FILM COATED ORAL DAILY PRN
Qty: 30 TABLET | Refills: 2 | Status: SHIPPED | OUTPATIENT
Start: 2021-03-31 | End: 2022-03-30 | Stop reason: SDUPTHER

## 2021-03-31 RX ORDER — LORAZEPAM 0.5 MG/1
0.5 TABLET ORAL 3 TIMES DAILY PRN
Qty: 30 TABLET | Refills: 0 | Status: SHIPPED | OUTPATIENT
Start: 2021-03-31 | End: 2021-04-30

## 2021-03-31 RX ORDER — LANOLIN ALCOHOL/MO/W.PET/CERES
1000 CREAM (GRAM) TOPICAL DAILY
COMMUNITY
End: 2021-05-24

## 2021-03-31 ASSESSMENT — ENCOUNTER SYMPTOMS: SHORTNESS OF BREATH: 0

## 2021-03-31 ASSESSMENT — PATIENT HEALTH QUESTIONNAIRE - PHQ9
2. FEELING DOWN, DEPRESSED OR HOPELESS: 0
1. LITTLE INTEREST OR PLEASURE IN DOING THINGS: 0
SUM OF ALL RESPONSES TO PHQ9 QUESTIONS 1 & 2: 0
SUM OF ALL RESPONSES TO PHQ QUESTIONS 1-9: 0
SUM OF ALL RESPONSES TO PHQ QUESTIONS 1-9: 0

## 2021-03-31 NOTE — PROGRESS NOTES
drinks or has certain snack at night, he may have more upset stomach which contributes to anxiety. He can't rest well. Some snacks he eats are  honey roasted peanuts, baby josse bars. Potato chips and chocolate milk. Review of Systems   Constitutional: Negative for fatigue and fever. Respiratory: Negative for shortness of breath. Cardiovascular: Negative for chest pain and leg swelling. Physical Exam  Constitutional:       General: He is not in acute distress. Appearance: Normal appearance. He is not ill-appearing. Cardiovascular:      Rate and Rhythm: Normal rate and regular rhythm. Heart sounds: No murmur. Pulmonary:      Effort: Pulmonary effort is normal. No respiratory distress. Breath sounds: Normal breath sounds. No wheezing. Musculoskeletal:         General: No swelling. Neurological:      Mental Status: He is alert.    Psychiatric:         Mood and Affect: Mood normal.       BP Readings from Last 3 Encounters:   03/31/21 130/74   02/23/21 139/80   01/12/21 128/80     Wt Readings from Last 3 Encounters:   03/31/21 208 lb (94.3 kg)   02/23/21 206 lb 3.2 oz (93.5 kg)   01/12/21 201 lb 6.4 oz (91.4 kg)       Lab Results   Component Value Date    CHOL 170 03/26/2021    CHOL 215 (H) 10/01/2020    CHOL 176 10/31/2019     Lab Results   Component Value Date    TRIG 160 03/26/2021    TRIG 215 (H) 10/01/2020    TRIG 201 (H) 10/31/2019     Lab Results   Component Value Date    HDL 47 03/26/2021    HDL 46 10/01/2020    HDL 47 10/31/2019     Lab Results   Component Value Date    LDLCALC 91 03/26/2021    LDLCALC 126 10/01/2020    LDLCALC 89 10/31/2019     Lab Results   Component Value Date    LABVLDL 34 (H) 11/23/2016    LABVLDL 44 (H) 11/23/2015    LABVLDL 18 11/17/2014     Lab Results   Component Value Date    CHOLHDLRATIO 3.7 11/23/2016    CHOLHDLRATIO 4.5 11/23/2015    CHOLHDLRATIO 3.1 11/17/2014       Hospital Outpatient Visit on 03/26/2021   Component Date Value Ref Range Status    PSA 03/26/2021 <0.02  0.00 - 1.00 ng/ml Final    Comment: [facility] Prostate Cancer Early Detection Guidelines    Age 39-70      PSA <1 ng/ml, IRMA Normal(if done)-->repeat testing at    2-4 year intervals. PSA 1-3 ng/ml, IRMA Normal(if done)-->repeat testing at    1-2 year intervals. PSA >3 ng/ml or very suspicious IRMA-->Consider referral for    biopsy. Age >75, in select patients      PSA <3 ng/ml, IRMA normal(if done, and no other indications    for biopsy) -->repeat testing in select patients at    1-4 year intervals. PSA >3 ng/ml or very suspicious IRMA-->Consider referral for    biopsy. Performed at [address], [address]      Cholesterol, Total 03/26/2021 170  100 - 199 mg/dL Final    Comment:      <200          Desirable       200 - 239     Borderline High       >239          High      Triglycerides 03/26/2021 160  0 - 199 mg/dL Final    Comment:      <150          Desirable       150 - 199     Borderline High       200 - 499     High       >449          Very High       Ranges are based upon NCEP/ATP III guidelines.  HDL 03/26/2021 47  mg/dL Final    Comment:      Refer to General Chemistry for CHOL and TRIG results. HDL CLASSIFICATIONS FOR PATIENTS > 21YEARS OLD. <40               Undesirable (Major Risk Factor)       >60               Protective (Negative Risk Factor)      LDL Calculated 03/26/2021 91  mg/dL Final    Comment:      Refer to General Chemistry for CHOL and TRIG results.        LDL CLASSIFICATIONS FOR PATIENTS >21YEARS OLD:          ** Determination Invalid if TRIG >400 **       <100          Optimal       100 - 129     Near or Above Optimal       130 - 159     Borderline High       160 - 189     High Risk       >189          Very High Risk  Performed at [address] [place] [id number]      Hemoglobin A1C 03/26/2021 6.1  4.4 - 6.4 % Final    AVERAGE GLUCOSE 03/26/2021 123  70 - 126 mg/dL Final Performed at [address] [place] [address], [address]      Lab Results   Component Value Date    LABA1C 6.1 03/26/2021    LABA1C 6.5 (H) 10/01/2020    LABA1C 6.4 10/31/2019       An electronic signature was used to authenticate this note.     --Suresh Booth MD

## 2021-05-24 ENCOUNTER — OFFICE VISIT (OUTPATIENT)
Dept: FAMILY MEDICINE CLINIC | Age: 71
End: 2021-05-24
Payer: MEDICARE

## 2021-05-24 VITALS
DIASTOLIC BLOOD PRESSURE: 82 MMHG | HEART RATE: 73 BPM | OXYGEN SATURATION: 98 % | TEMPERATURE: 97.2 F | BODY MASS INDEX: 27.77 KG/M2 | HEIGHT: 72 IN | WEIGHT: 205 LBS | SYSTOLIC BLOOD PRESSURE: 150 MMHG

## 2021-05-24 DIAGNOSIS — M10.071 ACUTE IDIOPATHIC GOUT OF RIGHT FOOT: Primary | ICD-10-CM

## 2021-05-24 DIAGNOSIS — E11.9 TYPE 2 DIABETES MELLITUS WITHOUT COMPLICATION, WITHOUT LONG-TERM CURRENT USE OF INSULIN (HCC): ICD-10-CM

## 2021-05-24 PROCEDURE — 1036F TOBACCO NON-USER: CPT | Performed by: FAMILY MEDICINE

## 2021-05-24 PROCEDURE — 3044F HG A1C LEVEL LT 7.0%: CPT | Performed by: FAMILY MEDICINE

## 2021-05-24 PROCEDURE — 1123F ACP DISCUSS/DSCN MKR DOCD: CPT | Performed by: FAMILY MEDICINE

## 2021-05-24 PROCEDURE — 2022F DILAT RTA XM EVC RTNOPTHY: CPT | Performed by: FAMILY MEDICINE

## 2021-05-24 PROCEDURE — G8417 CALC BMI ABV UP PARAM F/U: HCPCS | Performed by: FAMILY MEDICINE

## 2021-05-24 PROCEDURE — 99213 OFFICE O/P EST LOW 20 MIN: CPT | Performed by: FAMILY MEDICINE

## 2021-05-24 PROCEDURE — G8427 DOCREV CUR MEDS BY ELIG CLIN: HCPCS | Performed by: FAMILY MEDICINE

## 2021-05-24 PROCEDURE — 3017F COLORECTAL CA SCREEN DOC REV: CPT | Performed by: FAMILY MEDICINE

## 2021-05-24 PROCEDURE — 4040F PNEUMOC VAC/ADMIN/RCVD: CPT | Performed by: FAMILY MEDICINE

## 2021-05-24 RX ORDER — INDOMETHACIN 50 MG/1
50 CAPSULE ORAL 3 TIMES DAILY PRN
Qty: 60 CAPSULE | Refills: 2 | Status: SHIPPED | OUTPATIENT
Start: 2021-05-24 | End: 2021-07-27

## 2021-05-24 NOTE — PROGRESS NOTES
[address] [place] [address] [address], [address], [place]  Phone:  [phone]  NNU:[phone]       Name: Dat Dockery  : 1950    Chief Complaint   Patient presents with    Gout     Right foot        HPI:     Dat Dockery is a 80 y.o. male who presents today for     HPI    Right foot with gout flare up. Had beer and red meat over the weekend. Started yesterday. First attack since 3/2020. Last 3/2019 -- ankle affected. Right big toe red, hot, swollen. Denies trauma or injury. Lab Results   Component Value Date    URICACID 6.8 2019     Lab Results   Component Value Date    LABA1C 6.1 2021     No results found for: EAG      Current Outpatient Medications:     indomethacin (INDOCIN) 50 MG capsule, Take 1 capsule by mouth 3 times daily as needed for Pain, Disp: 60 capsule, Rfl: 2    sildenafil (VIAGRA) 100 MG tablet, Take 1 tablet by mouth daily as needed for Erectile Dysfunction, Disp: 30 tablet, Rfl: 2    aspirin EC 81 MG EC tablet, Take 1 tablet by mouth daily, Disp: 90 tablet, Rfl: 1    metoprolol tartrate (LOPRESSOR) 25 MG tablet, Take 1 tablet by mouth 2 times daily, Disp: 180 tablet, Rfl: 3    simvastatin (ZOCOR) 10 MG tablet, Take 1 tablet by mouth nightly, Disp: 90 tablet, Rfl: 3    lisinopril-hydroCHLOROthiazide (PRINZIDE;ZESTORETIC) 20-25 MG per tablet, Take 1 tablet by mouth daily, Disp: 90 tablet, Rfl: 3    Glucosamine-Chondroit-Vit C-Mn (GLUCOSAMINE 1500 COMPLEX PO), Take 1 capsule by mouth daily , Disp: , Rfl:     Probiotic Product (PROBIOTIC DAILY PO), Take 1 tablet by mouth daily , Disp: , Rfl:     Ascorbic Acid (VITAMIN C) 500 MG tablet, Take 500 mg by mouth daily. , Disp: , Rfl:     fish oil-omega-3 fatty acids 1000 MG capsule, Take 1 g by mouth daily , Disp: , Rfl:     Coenzyme Q10 (CO Q 10) 100 MG CAPS, Take 200 mg by mouth daily. , Disp: , Rfl:     No Known Allergies    Review of Systems  No fever/chills.     Objective:     BP (!) 150/82 (Site: Left Upper Arm, Position: Sitting, Cuff Size: Large Adult)   Pulse 73   Temp 97.2 °F (36.2 °C) (Temporal)   Ht 6' (1.829 m)   Wt 205 lb (93 kg)   SpO2 98%   BMI 27.80 kg/m²     Physical Exam  Gen: NAD, AAO x 3, coherent, pleasant  Right great toe at the MTP joint has mild erythema with acute tenderness and mild swelling localized to the joint. The whole joint is painful to move. Assessment/Plan:     Arlene Lorenz was seen today for gout. Diagnoses and all orders for this visit:    Acute idiopathic gout of right foot  -     Uric Acid; Future  -     indomethacin (INDOCIN) 50 MG capsule; Take 1 capsule by mouth 3 times daily as needed for Pain    Type 2 diabetes mellitus without complication, without long-term current use of insulin ([facility])  -     Microalbumin / Creatinine Urine Ratio; Future    Patient would prefer indomethacin avoid colchicine due to needing to be ready for vacation in a couple days and does not want to have the risk of diarrhea. He will do uric acid at the next laboratories done with [facility]. We discussed a healthy diet that would help lower uric acid and good hydration. Return for 9/2021 as planned. Future Appointments   Date Time Provider Cam Bonds   7/27/2021  9:00 AM Romelia Escobedo [facility] N [facility]   9/29/2021 10:20 AM MD KIMMIE Talbert [facility]PH[facility] AM [facility] II.TIM          This office note has been dictated. Effort was made to review for errors but some may have been missed. Please contact Rashida Deal of [facility] for clarification if needed.        Electronically signed by Rusty Baer MD on 5/24/2021 at 11:25 AM

## 2021-07-27 ENCOUNTER — OFFICE VISIT (OUTPATIENT)
Dept: CARDIOLOGY CLINIC | Age: 71
End: 2021-07-27
Payer: MEDICARE

## 2021-07-27 VITALS
BODY MASS INDEX: 26.55 KG/M2 | DIASTOLIC BLOOD PRESSURE: 96 MMHG | HEIGHT: 72 IN | SYSTOLIC BLOOD PRESSURE: 161 MMHG | HEART RATE: 71 BPM | WEIGHT: 196 LBS

## 2021-07-27 DIAGNOSIS — I48.0 PAF (PAROXYSMAL ATRIAL FIBRILLATION) (HCC): Primary | ICD-10-CM

## 2021-07-27 PROCEDURE — 3017F COLORECTAL CA SCREEN DOC REV: CPT | Performed by: INTERNAL MEDICINE

## 2021-07-27 PROCEDURE — G8427 DOCREV CUR MEDS BY ELIG CLIN: HCPCS | Performed by: INTERNAL MEDICINE

## 2021-07-27 PROCEDURE — G8417 CALC BMI ABV UP PARAM F/U: HCPCS | Performed by: INTERNAL MEDICINE

## 2021-07-27 PROCEDURE — 4040F PNEUMOC VAC/ADMIN/RCVD: CPT | Performed by: INTERNAL MEDICINE

## 2021-07-27 PROCEDURE — 1036F TOBACCO NON-USER: CPT | Performed by: INTERNAL MEDICINE

## 2021-07-27 PROCEDURE — 1123F ACP DISCUSS/DSCN MKR DOCD: CPT | Performed by: INTERNAL MEDICINE

## 2021-07-27 PROCEDURE — 99214 OFFICE O/P EST MOD 30 MIN: CPT | Performed by: INTERNAL MEDICINE

## 2021-07-27 NOTE — PROGRESS NOTES
[address] [address] [place] [address]  Dept: [phone]  Dept Fax: [phone]  Loc: [phone]    Visit Date: 7/27/2021    Mr. Bhaskar Ley is a 80 y.o. male  who presented for:    Atrial fibrillation     HPI:   LUCY Bruce is a pleasant 80year old male patient who  has a past medical history of Gout, Hyperlipidemia, Hypertension, and Prostate cancer ([facility] [address] On 12/2020, the patient was admitted for sepsis, testicular contusion, required left orchiectomy. During his hospital stay, the patient was diagnosed with atrial flutter RVR, underwent MONIQUE guided DCCV. He was discharged home on Eliquis. He was subsequently seen in office, event monitor was ordered to assess possibility of stopping Eliquis. Cardiac event monitor 01/2021 revealed NSR and only one 1 minute run of narrow complex tachycardia/SVT. Started working out 2-3 times per week, tolerating well. He states that he stopped taking Eliquis because it is \"too expensive\" and no reported events on cardiac event monitor. /96. HR 71. Home BP readings variable. SBP up to 150s at home, lowest in 110s. Patient denies chest pain, shortness of breath, dyspnea on exertion, orthopnea, paroxysmal nocturnal dyspnea, palpitations, dizziness, syncope, weight gain or leg swelling.      Current Outpatient Medications:     indomethacin (INDOCIN) 50 MG capsule, Take 1 capsule by mouth 3 times daily as needed for Pain, Disp: 60 capsule, Rfl: 2    sildenafil (VIAGRA) 100 MG tablet, Take 1 tablet by mouth daily as needed for Erectile Dysfunction, Disp: 30 tablet, Rfl: 2    aspirin EC 81 MG EC tablet, Take 1 tablet by mouth daily, Disp: 90 tablet, Rfl: 1    metoprolol tartrate (LOPRESSOR) 25 MG tablet, Take 1 tablet by mouth 2 times daily, Disp: 180 tablet, Rfl: 3    simvastatin (ZOCOR) 10 MG tablet, Take 1 tablet by mouth nightly, Disp: 90 tablet, Rfl: 3   lisinopril-hydroCHLOROthiazide (PRINZIDE;ZESTORETIC) 20-25 MG per tablet, Take 1 tablet by mouth daily, Disp: 90 tablet, Rfl: 3    Glucosamine-Chondroit-Vit C-Mn (GLUCOSAMINE 1500 COMPLEX PO), Take 1 capsule by mouth daily , Disp: , Rfl:     Probiotic Product (PROBIOTIC DAILY PO), Take 1 tablet by mouth daily , Disp: , Rfl:     Ascorbic Acid (VITAMIN C) 500 MG tablet, Take 500 mg by mouth daily. , Disp: , Rfl:     fish oil-omega-3 fatty acids 1000 MG capsule, Take 1 g by mouth daily , Disp: , Rfl:     Coenzyme Q10 (CO Q 10) 100 MG CAPS, Take 200 mg by mouth daily. , Disp: , Rfl:     Past Medical History  Salas Carbajal  has a past medical history of Gout, Hyperlipidemia, Hypertension, and Prostate cancer ([facility] [address] Social History  Salas Carbajal  reports that he quit smoking about 4 years ago. His smoking use included cigarettes. He started smoking about 41 years ago. He has a 7.50 pack-year smoking history. He has never used smokeless tobacco. He reports current alcohol use. He reports that he does not use drugs. Family History  Salas Carbajal family history includes Cancer (age of onset: 80) in his father and mother. Past Surgical History   Past Surgical History:   Procedure Laterality Date    COLONOSCOPY      JOINT REPLACEMENT      right hip    [place] EXPLORE SCROTUM Left 12/3/2020    LEFT TESTICLE  EXPLORATION AND [address] OUT performed by Ihsan Dunn MD at [address] [place] [address]  2010    brachytherapy   [address] TOTAL HIP ARTHROPLASTY      right       Review of Systems   Constitutional: Negative for chills and fever  HENT: Negative for congestion, sinus pressure, sneezing and sore throat. Eyes: Negative for pain, discharge, redness and itching. Respiratory: Negative for apnea, cough  Gastrointestinal: Negative for blood in stool, constipation, diarrhea   Endocrine: Negative for cold intolerance, heat intolerance, polydipsia. Genitourinary: Negative for dysuria, enuresis, flank pain and hematuria. Musculoskeletal: Negative for arthralgias, joint swelling and neck pain. Neurological: Negative for numbness and headaches. Psychiatric/Behavioral: Negative for agitation, confusion, decreased concentration and dysphoric mood. Objective: There were no vitals taken for this visit. Wt Readings from Last 3 Encounters:   05/24/21 205 lb (93 kg)   03/31/21 208 lb (94.3 kg)   02/23/21 206 lb 3.2 oz (93.5 kg)     BP Readings from Last 3 Encounters:   05/24/21 (!) 150/82   03/31/21 130/74   02/23/21 139/80       Nursing note and vitals reviewed. Physical Exam   Constitutional: Oriented to person, place, and time. Appears well-developed and well-nourished. ENT: Moist mucous membranes. No bleeding. Tongue is midline. Head: Normocephalic and atraumatic. Eyes: EOM are normal. Pupils are equal, round, and reactive to light. Neck: Normal range of motion. Neck supple. No JVD present. Cardiovascular: Normal rate, regular rhythm, no murmur, no rubs, and intact distal pulses. Pulmonary/Chest: Effort normal and breath sounds normal. No respiratory distress. No wheezes. No rales. Abdominal: Soft. Bowel sounds are normal. No distension. There is no tenderness. Musculoskeletal: Normal range of motion. no edema. Neurological: Alert and oriented to person, place, and time. No cranial nerve deficit. Coordination normal.   Skin: Skin is warm and dry. Psychiatric: Normal mood and affect.        No results found for: CKTOTAL, CKMB, CKMBINDEX    Lab Results   Component Value Date    WBC 10.0 12/06/2020    RBC 3.87 12/06/2020    HGB 12.2 12/06/2020    HCT 36.9 12/06/2020    MCV 95.3 12/06/2020    MCH 31.5 12/06/2020    MCHC 33.1 12/06/2020    RDW 13.3 12/02/2020     12/06/2020    MPV 9.9 12/06/2020       Lab Results   Component Value Date     12/05/2020    K 4.1 12/05/2020     12/05/2020    CO2 23 12/05/2020    BUN 25 12/05/2020    LABALBU 4.3 10/01/2020    CREATININE 0.7 12/05/2020 CALCIUM 8.0 12/05/2020    LABGLOM >90 12/05/2020    GLUCOSE 142 12/05/2020    GLUCOSE 109 11/23/2016       Lab Results   Component Value Date    ALKPHOS 49 10/01/2020    ALT 28 10/01/2020    AST 26 10/01/2020    PROT 7.1 10/01/2020    BILITOT 0.9 10/01/2020    LABALBU 4.3 10/01/2020       Lab Results   Component Value Date    MG 2.3 12/05/2020       No results found for: INR, PROTIME      Lab Results   Component Value Date    LABA1C 6.1 03/26/2021       Lab Results   Component Value Date    TRIG 160 03/26/2021    HDL 47 03/26/2021    LDLCALC 91 03/26/2021    LABVLDL 34 11/23/2016       Lab Results   Component Value Date    TSH 0.378 12/03/2020         Testing Reviewed:      I have individually reviewed the cardiac test below:    ECHO: No results found for this or any previous visit. TEST TYPE:  30-day cardiac event monitor.     DATE OF ENROLLMENT:  01/12/2021 until 02/10/2021.     INDICATION:  Arrhythmia, atrial fibrillation suspected, palpitations.     FINDINGS:  The patient's monitoring period was between 01/12/2021 and  02/10/2021.  The underlying baseline rhythm was consistent with normal  sinus rhythm with an average heart rate of 70 beats per minute.  No  evidence for atrial fibrillation detected during this monitoring. Occasional PVCs.  Occasional underlying artifact.  One run of regular  narrow complex tachycardia most consistent with supraventricular  tachycardia.  This run lasted for one minute.     IMPRESSION:  1.  Normal sinus rhythm. 2.  One run of narrow complex tachycardia, most consistent with  paroxysmal supraventricular tachycardia.  No correlation with symptoms. AssessmentPlan:   Jaime Conn is a pleasant 80year old male patient who  has a past medical history of Gout, Hyperlipidemia, Hypertension, and Prostate cancer ([facility] [address] On 12/2020, the patient was admitted for sepsis, testicular contusion, required left orchiectomy.  During his hospital stay, the patient was diagnosed with atrial flutter RVR, underwent MONIQUE guided DCCV. He was discharged home on Eliquis. He was subsequently seen in office, event monitor was ordered to assess possibility of stopping Eliquis. Cardiac event monitor 01/2021 revealed NSR and only one 1 minute run of narrow complex tachycardia/SVT. Started working out 2-3 times per week, tolerating well. He states that he stopped taking Eliquis because it is \"too expensive\" and no reported events on cardiac event monitor. /96. HR 71. Home BP readings variable. SBP up to 150s at home, lowest in 110s. Patient denies chest pain, shortness of breath, dyspnea on exertion, orthopnea, paroxysmal nocturnal dyspnea, palpitations, dizziness, syncope, weight gain or leg swelling. 1. Post operative AF *1 episode   2. Paroxysmal atrial flutter (diagnosed post orchiectomy 12/2020)  3. Chadsvasc score 2  4. Cardiac event monitor (only one minute run of SVT detected)   5. HTN, uncontrolled        /96. HR 71. Home BP readings variable. SBP up to 150s at home, lowest in 110s   Patient reports possible white coat HTN    Counseled about need for better BP control   Prefers to avoid more medications   The patient was instructed to check the blood pressure at home, and record the readings. Patient will call office with blood pressure readings, will adjust patient's antihypertensive medications as needed accordingly   In prior visit, patient reported chronic easy bruising. D/W patient and family, they wished to proceed with attempt to stop Amiodarone and to stop Eliquis. No AF on event monitor   He denies palpitations. Advised to call office should symptoms occur    Cont Metoprolol    ASA 81 mg po daily   Hyperlipidemia: on statins, followed periodically. Patient need periodic lipid and liver profile    Above findings and plan of care were discussed with patient in details, patient's questions were answered.      Disposition:  RTC in 1 year            Electronically signed by Pebbles Delgadillo MD, [facility]    7/27/2021 at 7:54 AM EDT

## 2021-09-22 ENCOUNTER — HOSPITAL ENCOUNTER (OUTPATIENT)
Age: 71
Discharge: HOME | End: 2021-09-22
Payer: MEDICARE

## 2021-09-22 PROCEDURE — 84550 ASSAY OF BLOOD/URIC ACID: CPT

## 2021-09-22 PROCEDURE — 82043 UR ALBUMIN QUANTITATIVE: CPT

## 2021-09-22 PROCEDURE — 36415 COLL VENOUS BLD VENIPUNCTURE: CPT

## 2021-09-23 LAB
CREATININE, URINE: 111.2 MG/DL
MICROALBUMIN UR-MCNC: 6.58 MG/DL
MICROALBUMIN/CREAT UR-RTO: 59 MG/G (ref 0–30)
URIC ACID: 7.2 MG/DL (ref 3.7–7)

## 2021-09-29 ENCOUNTER — OFFICE VISIT (OUTPATIENT)
Dept: FAMILY MEDICINE CLINIC | Age: 71
End: 2021-09-29
Payer: MEDICARE

## 2021-09-29 VITALS
DIASTOLIC BLOOD PRESSURE: 84 MMHG | HEART RATE: 76 BPM | BODY MASS INDEX: 26.17 KG/M2 | WEIGHT: 193.2 LBS | TEMPERATURE: 97.3 F | SYSTOLIC BLOOD PRESSURE: 156 MMHG | HEIGHT: 72 IN

## 2021-09-29 DIAGNOSIS — I10 ESSENTIAL HYPERTENSION: ICD-10-CM

## 2021-09-29 DIAGNOSIS — F41.9 ANXIETY: ICD-10-CM

## 2021-09-29 DIAGNOSIS — Z00.00 ROUTINE GENERAL MEDICAL EXAMINATION AT A HEALTH CARE FACILITY: Primary | ICD-10-CM

## 2021-09-29 DIAGNOSIS — E78.2 MIXED HYPERCHOLESTEROLEMIA AND HYPERTRIGLYCERIDEMIA: ICD-10-CM

## 2021-09-29 DIAGNOSIS — R80.9 MICROALBUMINURIA: ICD-10-CM

## 2021-09-29 PROCEDURE — 1123F ACP DISCUSS/DSCN MKR DOCD: CPT | Performed by: FAMILY MEDICINE

## 2021-09-29 PROCEDURE — 4040F PNEUMOC VAC/ADMIN/RCVD: CPT | Performed by: FAMILY MEDICINE

## 2021-09-29 PROCEDURE — G0439 PPPS, SUBSEQ VISIT: HCPCS | Performed by: FAMILY MEDICINE

## 2021-09-29 PROCEDURE — 3017F COLORECTAL CA SCREEN DOC REV: CPT | Performed by: FAMILY MEDICINE

## 2021-09-29 RX ORDER — LORAZEPAM 0.5 MG/1
0.5 TABLET ORAL 2 TIMES DAILY PRN
Qty: 30 TABLET | Refills: 0 | Status: SHIPPED | OUTPATIENT
Start: 2021-09-29 | End: 2021-10-29

## 2021-09-29 RX ORDER — SIMVASTATIN 10 MG
10 TABLET ORAL NIGHTLY
Qty: 90 TABLET | Refills: 3 | Status: SHIPPED | OUTPATIENT
Start: 2021-09-29 | End: 2022-11-02 | Stop reason: SDUPTHER

## 2021-09-29 RX ORDER — LISINOPRIL AND HYDROCHLOROTHIAZIDE 25; 20 MG/1; MG/1
1 TABLET ORAL DAILY
Qty: 90 TABLET | Refills: 3 | Status: SHIPPED | OUTPATIENT
Start: 2021-09-29 | End: 2022-03-30 | Stop reason: SDUPTHER

## 2021-09-29 ASSESSMENT — PATIENT HEALTH QUESTIONNAIRE - PHQ9
SUM OF ALL RESPONSES TO PHQ QUESTIONS 1-9: 2
SUM OF ALL RESPONSES TO PHQ9 QUESTIONS 1 & 2: 2
2. FEELING DOWN, DEPRESSED OR HOPELESS: 1
SUM OF ALL RESPONSES TO PHQ QUESTIONS 1-9: 2
SUM OF ALL RESPONSES TO PHQ QUESTIONS 1-9: 2
1. LITTLE INTEREST OR PLEASURE IN DOING THINGS: 1

## 2021-09-29 ASSESSMENT — LIFESTYLE VARIABLES
HOW OFTEN DURING THE LAST YEAR HAVE YOU BEEN UNABLE TO REMEMBER WHAT HAPPENED THE NIGHT BEFORE BECAUSE YOU HAD BEEN DRINKING: 0
HOW OFTEN DURING THE LAST YEAR HAVE YOU HAD A FEELING OF GUILT OR REMORSE AFTER DRINKING: 0
HOW OFTEN DO YOU HAVE SIX OR MORE DRINKS ON ONE OCCASION: 0
HOW OFTEN DURING THE LAST YEAR HAVE YOU NEEDED AN ALCOHOLIC DRINK FIRST THING IN THE MORNING TO GET YOURSELF GOING AFTER A NIGHT OF HEAVY DRINKING: 0
HOW OFTEN DURING THE LAST YEAR HAVE YOU FOUND THAT YOU WERE NOT ABLE TO STOP DRINKING ONCE YOU HAD STARTED: 0
AUDIT TOTAL SCORE: 3
HOW OFTEN DURING THE LAST YEAR HAVE YOU FAILED TO DO WHAT WAS NORMALLY EXPECTED FROM YOU BECAUSE OF DRINKING: 0
HOW OFTEN DO YOU HAVE A DRINK CONTAINING ALCOHOL: 2
HAS A RELATIVE, FRIEND, DOCTOR, OR ANOTHER HEALTH PROFESSIONAL EXPRESSED CONCERN ABOUT YOUR DRINKING OR SUGGESTED YOU CUT DOWN: 0
AUDIT-C TOTAL SCORE: 3
HOW MANY STANDARD DRINKS CONTAINING ALCOHOL DO YOU HAVE ON A TYPICAL DAY: 1
HAVE YOU OR SOMEONE ELSE BEEN INJURED AS A RESULT OF YOUR DRINKING: 0

## 2021-09-29 NOTE — PROGRESS NOTES
Medicare Annual Wellness Visit  Name: Joec Lay Date: 2021   MRN: [id number] Sex: Male   Age: 80 y.o. Ethnicity: Non- / Non    : 1950 Race: White (non-)      Muriel Bravo is here for Medicare AWV    Screenings for behavioral, psychosocial and functional/safety risks, and cognitive dysfunction are all negative except as indicated below. These results, as well as other patient data from the [address] form, are documented in Flowsheets linked to this Encounter. HTN not well controlled here. BP at home, 152/80, 126/72, 108/69   Increased stress during this summer. Not exercising or eating well. Also needs statin refilled. Getting up 2-3 times at night. This is different than before. Admits to poor diet and beverage choices. Has gained weight. Reports a debacle in summer plans due to an accident. His grandchild was dissapointed but he is hoping   Ativan use continues as needed. Finds it effective without side effect. Denies using more than prescribed. Also not combining with other medications.     BP Readings from Last 10 Encounters:   21 (!) 158/100   21 (!) 161/96   21 (!) 150/82   21 130/74   21 139/80   21 128/80   20 132/80   20 (!) 158/78   20 (!) 115/53   10/01/20 118/68     Wt Readings from Last 3 Encounters:   21 193 lb 3.2 oz (87.6 kg)   21 196 lb (88.9 kg)   21 205 lb (93 kg)       Lab Results   Component Value Date    LABA1C 6.1 2021     No results found for: EAG  Lab Results   Component Value Date    LABA1C 6.1 2021    LABA1C 6.5 (H) 10/01/2020    LABA1C 6.4 10/31/2019       Lab Results   Component Value Date     (L) 2020    K 4.1 2020     2020    CO2 23 2020    BUN 25 (H) 2020    CREATININE 0.7 2020    CALCIUM 8.0 (L) 2020    GLUCOSE 142 (H) 2020        Lab Results   Component Value Date CHOL 170 03/26/2021    CHOL 215 (H) 10/01/2020    CHOL 176 10/31/2019     Lab Results   Component Value Date    TRIG 160 03/26/2021    TRIG 215 (H) 10/01/2020    TRIG 201 (H) 10/31/2019     Lab Results   Component Value Date    HDL 47 03/26/2021    HDL 46 10/01/2020    HDL 47 10/31/2019     Lab Results   Component Value Date    LDLCALC 91 03/26/2021    LDLCALC 126 10/01/2020    LDLCALC 89 10/31/2019     Lab Results   Component Value Date    LABVLDL 34 (H) 11/23/2016    LABVLDL 44 (H) 11/23/2015    LABVLDL 18 11/17/2014     Lab Results   Component Value Date    CHOLHDLRATIO 3.7 11/23/2016    CHOLHDLRATIO 4.5 11/23/2015    CHOLHDLRATIO 3.1 11/17/2014       Lab Results   Component Value Date    LABMICR 6.58 09/22/2021       Lab Results   Component Value Date    TSH 0.378 (L) 12/03/2020      No results found for: [id number]   Lab Results   Component Value Date    MG 2.3 12/05/2020      Lab Results   Component Value Date    ALT 28 10/01/2020    AST 26 10/01/2020    ALKPHOS 49 10/01/2020    BILITOT 0.9 10/01/2020        Lab Results   Component Value Date    WBC 10.0 12/06/2020    HGB 12.2 (L) 12/06/2020    HCT 36.9 (L) 12/06/2020    MCV 95.3 (H) 12/06/2020     12/06/2020       No Known Allergies      Prior to Visit Medications    Medication Sig Taking? Authorizing Provider   simvastatin (ZOCOR) 10 MG tablet Take 1 tablet by mouth nightly Yes Remberto Hillman MD   lisinopril-hydroCHLOROthiazide (PRINZIDE;ZESTORETIC) 20-25 MG per tablet Take 1 tablet by mouth daily Yes Remberto Hillman MD   LORazepam (ATIVAN) 0.5 MG tablet Take 1 tablet by mouth 2 times daily as needed for Anxiety for up to 30 days.  Yes Remberto Hillman MD   sildenafil (VIAGRA) 100 MG tablet Take 1 tablet by mouth daily as needed for Erectile Dysfunction Yes Remberto Hillman MD   aspirin EC 81 MG EC tablet Take 1 tablet by mouth daily Yes Reid Drake MD   metoprolol tartrate (LOPRESSOR) 25 MG tablet Take 1 tablet by mouth 2 times daily Yes Rain Lemus, APRN - CNP   Glucosamine-Chondroit-Vit C-Mn (GLUCOSAMINE 1500 COMPLEX PO) Take 1 capsule by mouth daily  Yes Historical Provider, MD   Probiotic Product (PROBIOTIC DAILY PO) Take 1 tablet by mouth daily  Yes Historical Provider, MD   Ascorbic Acid (VITAMIN C) 500 MG tablet Take 500 mg by mouth daily. Yes Historical Provider, MD   fish oil-omega-3 fatty acids 1000 MG capsule Take 1 g by mouth daily  Yes Historical Provider, MD   Coenzyme Q10 (CO Q 10) 100 MG CAPS Take 200 mg by mouth daily. Yes Historical Provider, MD         Past Medical History:   Diagnosis Date    Gout     Hyperlipidemia     Hypertension     Prostate cancer ([facility] [address] 8/9/2011      External Beam radiation + Intersititial Brachytherapy for Delicia score 4+3, PSA 3.6, stage T2A. Adenocarcinoma of the Prostate completed in October 2010.            Past Surgical History:   Procedure Laterality Date    COLONOSCOPY      JOINT REPLACEMENT      right hip    HI EXPLORE SCROTUM Left 12/3/2020    LEFT TESTICLE  EXPLORATION AND [address] OUT performed by Yin Raya MD at [address] [place] [address] SURGERY  2010    brachytherapy    TONSILLECTOMY      TOTAL HIP ARTHROPLASTY      right         Family History   Problem Relation Age of Onset   Toan Rose Cancer Mother 80        Pancreas,  CHF, DM    Cancer Father 80        Lung cancer, COPD    Prostate Cancer Neg Hx        CareTeam (Including outside providers/suppliers regularly involved in providing care):   Patient Care Team:  Brianda Urban MD as PCP - General (Family Medicine)  Brianda Urban MD as PCP - [address] [place] [address] Provider    Wt Readings from Last 3 Encounters:   09/29/21 193 lb 3.2 oz (87.6 kg)   07/27/21 196 lb (88.9 kg)   05/24/21 205 lb (93 kg)     Vitals:    09/29/21 1029   BP: (!) 158/100   Site: Left Upper Arm   Position: Sitting   Cuff Size: Medium Adult   Pulse: 76   Temp: 97.3 °F (36.3 °C)   TempSrc: Temporal   Weight: 193 lb 3.2 oz (87.6 kg)   Height: 6' (1.829 m) Body mass index is 26.2 kg/m². Based upon direct observation of the patient, evaluation of cognition reveals recent and remote memory intact. General Appearance: alert and oriented to person, place and time, well-developed and well-nourished, in no acute distress  Pulmonary/Chest: clear to auscultation bilaterally- no wheezes, rales or rhonchi, normal air movement, no respiratory distress  Cardiovascular: normal rate and normal S1 and S2    Visual inspection:  Deformity/amputation: absent  Skin lesions/pre-ulcerative calluses: absent  Edema: right- negative, left- negative    Sensory exam:  Monofilament sensation: normal  (minimum of 5 random plantar locations tested, avoiding callused areas - > 1 area with absence of sensation is + for neuropathy)    Plus at least one of the following:  Pulses: normal,   Pinprick: Intact  Proprioception: Absent  Vibration (128 Hz): Absent      Patient's complete Health Risk Assessment and screening values have been reviewed and are found in Flowsheets. The following problems were reviewed today and where indicated follow up appointments were made and/or referrals ordered. Positive Risk Factor Screenings with Interventions:            General Health and ACP:  General  In general, how would you say your health is?: Good  In the past 7 days, have you experienced any of the following?  New or Increased Pain, New or Increased Fatigue, Loneliness, Social Isolation, Stress or Anger?: None of These  Do you get the social and emotional support that you need?: Yes  Do you have a Living Will?: Yes  Advance Directives     Power of  Living Will ACP-Advance Directive ACP-Power of     Not on File Not on File Not on File Not on File      General Health Risk Interventions:  · No further interventions        Personalized Preventive Plan   Current Health Maintenance Status  Immunization History   Administered Date(s) Administered    COVID-19, Pfizer, PF, 30mcg/0.3mL 02/11/2021, 03/04/2021    Hepatitis B 01/29/1993, 03/04/1993, 06/29/1993    Influenza Virus Vaccine 11/25/2014, 12/01/2015, 11/21/2017, 11/04/2019    Influenza, High Dose (Fluzone 65 yrs and older) 11/20/2018    Influenza, [place], 6-35 months, IM, PF (Fluzone, Afluria) 10/12/2020    Influenza, Quadv, IM, (6 mo and older Fluzone, Flulaval, Fluarix and 3 yrs and older Afluria) 11/21/2017, 11/04/2019, 10/12/2020    Pneumococcal Conjugate 13-valent ([id number]) 05/23/2017    Pneumococcal Polysaccharide ([id number]) 11/20/2018    Tdap (Boostrix, Adacel) 11/25/2014    Zoster Live (Zostavax) 01/15/2015        Health Maintenance   Topic Date Due    AAA screen  Never done    Diabetic foot exam  Never done    Diabetic retinal exam  Never done    Shingles Vaccine (2 of 3) 03/12/2015    Annual Wellness Visit (AWV)  Never done    Flu vaccine (1) 09/01/2021    Potassium monitoring  12/05/2021    Creatinine monitoring  12/05/2021    A1C test (Diabetic or Prediabetic)  03/26/2022    Lipid screen  03/26/2022    PSA counseling  03/26/2022    Diabetic microalbuminuria test  09/22/2022    Colon cancer screen colonoscopy  08/03/2023    DTaP/Tdap/Td vaccine (2 - Td or Tdap) 11/25/2024    Pneumococcal 65+ years Vaccine  Completed    COVID-19 Vaccine  Completed    Hepatitis A vaccine  Aged Out    Hib vaccine  Aged Out    Meningococcal (ACWY) vaccine  Aged Out    Hepatitis C screen  Discontinued     Recommendations for [employer] Due: see orders and patient instructions/AVS.  . Recommended screening schedule for the next 5-10 years is provided to the patient in written form: see Patient Instructions/AVS.    Po Verma was seen today for medicare awv. Diagnoses and all orders for this visit:    Routine general medical examination at a health care facility    Mixed hypercholesterolemia and hypertriglyceridemia  -     simvastatin (ZOCOR) 10 MG tablet;  Take 1 tablet by mouth nightly    Essential hypertension  -     lisinopril-hydroCHLOROthiazide (PRINZIDE;ZESTORETIC) 20-25 MG per tablet; Take 1 tablet by mouth daily    Microalbuminuria    Anxiety  -     LORazepam (ATIVAN) 0.5 MG tablet; Take 1 tablet by mouth 2 times daily as needed for Anxiety for up to 30 days. Related uncontrolled BP here in office. However he has low normal BP at home. He wants to focus on Diet and drink improvement. He knows what goal BP is and why it is important to see it come down. He will let us know if it is not well-controlled over next couple of months. Yearly eye exam needed      Return in 6 months (on 3/29/2022).       David Maya MD

## 2021-09-29 NOTE — PATIENT INSTRUCTIONS
Staff -- please repeat blood pressure for this patient before patient leaves the office. If blood pressure not <140/90, then please arrange follow up in 1 month with nurse visit. Thank you      To protect your eyes from damage due to diabetes, please schedule an appointment with your ophthalmologist. Please let us know if you need help doing this. Personalized Preventive Plan for Ovidio Medina - 9/29/2021  Medicare offers a range of preventive health benefits. Some of the tests and screenings are paid in full while other may be subject to a deductible, co-insurance, and/or copay. Some of these benefits include a comprehensive review of your medical history including lifestyle, illnesses that may run in your family, and various assessments and screenings as appropriate. After reviewing your medical record and screening and assessments performed today your provider may have ordered immunizations, labs, imaging, and/or referrals for you. A list of these orders (if applicable) as well as your Preventive Care list are included within your After Visit Summary for your review. Other Preventive Recommendations:    · A preventive eye exam performed by an eye specialist is recommended every 1-2 years to screen for glaucoma; cataracts, macular degeneration, and other eye disorders. · A preventive dental visit is recommended every 6 months. · Try to get at least 150 minutes of exercise per week or 10,000 steps per day on a pedometer . · Order or download the FREE \"Exercise & Physical Activity: Your Everyday Guide\" from [employer]. Call 5-385.564.1080 or search [employer] online. · You need 1260-0587 mg of calcium and 7155-1715 IU of vitamin D per day.  It is possible to meet your calcium requirement with diet alone, but a vitamin D supplement is usually necessary to meet this goal.  · When exposed to the sun, use a sunscreen that protects against both UVA and UVB radiation with an SPF of 30 or greater. Reapply every 2 to 3 hours or after sweating, drying off with a towel, or swimming. · Always wear a seat belt when traveling in a car. Always wear a helmet when riding a bicycle or motorcycle.

## 2021-12-07 ENCOUNTER — NURSE ONLY (OUTPATIENT)
Dept: FAMILY MEDICINE CLINIC | Age: 71
End: 2021-12-07
Payer: MEDICARE

## 2021-12-07 ENCOUNTER — HOSPITAL ENCOUNTER (OUTPATIENT)
Age: 71
Discharge: HOME | End: 2021-12-07
Payer: MEDICARE

## 2021-12-07 DIAGNOSIS — E11.9 TYPE 2 DIABETES MELLITUS WITHOUT COMPLICATION, WITHOUT LONG-TERM CURRENT USE OF INSULIN (HCC): ICD-10-CM

## 2021-12-07 DIAGNOSIS — I10 ESSENTIAL HYPERTENSION: ICD-10-CM

## 2021-12-07 DIAGNOSIS — Z23 NEED FOR INFLUENZA VACCINATION: Primary | ICD-10-CM

## 2021-12-07 LAB
ALBUMIN SERPL-MCNC: 4.4 G/DL (ref 3.5–5.1)
ALP BLD-CCNC: 67 U/L (ref 38–126)
ALT SERPL-CCNC: 26 U/L (ref 11–66)
ANION GAP SERPL CALCULATED.3IONS-SCNC: 13 MEQ/L (ref 8–16)
AST SERPL-CCNC: 26 U/L (ref 5–40)
AVERAGE GLUCOSE: 129 MG/DL (ref 70–126)
BILIRUB SERPL-MCNC: 0.7 MG/DL (ref 0.3–1.2)
BUN BLDV-MCNC: 33 MG/DL (ref 7–22)
CALCIUM SERPL-MCNC: 10.1 MG/DL (ref 8.5–10.5)
CHLORIDE BLD-SCNC: 99 MEQ/L (ref 98–111)
CHOLESTEROL, TOTAL: 165 MG/DL (ref 100–199)
CO2: 26 MEQ/L (ref 23–33)
CREAT SERPL-MCNC: 1.2 MG/DL (ref 0.4–1.2)
GFR SERPL CREATININE-BSD FRML MDRD: 60 ML/MIN/1.73M2
GLUCOSE BLD-MCNC: 123 MG/DL (ref 70–108)
HBA1C MFR BLD: 6.3 % (ref 4.4–6.4)
HDLC SERPL-MCNC: 46 MG/DL
LDL CHOLESTEROL CALCULATED: 93 MG/DL
POTASSIUM SERPL-SCNC: 4.2 MEQ/L (ref 3.5–5.2)
SODIUM BLD-SCNC: 138 MEQ/L (ref 135–145)
TOTAL PROTEIN: 6.9 G/DL (ref 6.1–8)
TRIGL SERPL-MCNC: 128 MG/DL (ref 0–199)

## 2021-12-07 PROCEDURE — 90694 VACC AIIV4 NO PRSRV 0.5ML IM: CPT | Performed by: FAMILY MEDICINE

## 2021-12-07 PROCEDURE — 83036 HEMOGLOBIN GLYCOSYLATED A1C: CPT

## 2021-12-07 PROCEDURE — G0008 ADMIN INFLUENZA VIRUS VAC: HCPCS | Performed by: FAMILY MEDICINE

## 2021-12-07 PROCEDURE — 80053 COMPREHEN METABOLIC PANEL: CPT

## 2021-12-07 PROCEDURE — 99999 PR OFFICE/OUTPT VISIT,PROCEDURE ONLY: CPT | Performed by: FAMILY MEDICINE

## 2021-12-07 PROCEDURE — 36415 COLL VENOUS BLD VENIPUNCTURE: CPT

## 2021-12-07 PROCEDURE — 80061 LIPID PANEL: CPT

## 2021-12-07 NOTE — PROGRESS NOTES
After obtaining consent, and per orders of Nancy Aguilar MD  Immunization(s) given during visit              Immunizations Administered     Name Date Dose Route    Influenza, Quadv, adjuvanted, 65 yrs +, IM, PF (Fluad) 12/7/2021 0.5 mL Intramuscular    Site: Deltoid- Left    Lot: [id number]    NDC: 06730-724-34

## 2021-12-28 ENCOUNTER — HOSPITAL ENCOUNTER (OUTPATIENT)
Age: 71
End: 2021-12-28

## 2021-12-29 ENCOUNTER — HOSPITAL ENCOUNTER (OUTPATIENT)
Dept: ULTRASOUND IMAGING | Age: 71
Discharge: HOME | End: 2021-12-29
Payer: MEDICARE

## 2021-12-29 DIAGNOSIS — Z13.6 SCREENING FOR AAA (ABDOMINAL AORTIC ANEURYSM): ICD-10-CM

## 2021-12-29 PROCEDURE — 76706 US ABDL AORTA SCREEN AAA: CPT

## 2022-03-02 ENCOUNTER — PATIENT MESSAGE (OUTPATIENT)
Dept: FAMILY MEDICINE CLINIC | Age: 72
End: 2022-03-02

## 2022-03-02 NOTE — TELEPHONE ENCOUNTER
From: Fortino Rodriguez  To: Dr. Atiya Simons: 3/2/2022 7:56 AM EST  Subject: Next office visit    What date and time is my next scheduled office visit ? Also, am I scheduled for any tests prior to my appt. ? Thanks, Tonya Morrell

## 2022-03-18 DIAGNOSIS — B00.1 COLD SORE: ICD-10-CM

## 2022-03-18 RX ORDER — VALACYCLOVIR HYDROCHLORIDE 500 MG/1
500 TABLET, FILM COATED ORAL 3 TIMES DAILY
Qty: 21 TABLET | Refills: 0 | OUTPATIENT
Start: 2022-03-18

## 2022-03-30 ENCOUNTER — OFFICE VISIT (OUTPATIENT)
Dept: FAMILY MEDICINE CLINIC | Age: 72
End: 2022-03-30
Payer: MEDICARE

## 2022-03-30 VITALS
DIASTOLIC BLOOD PRESSURE: 78 MMHG | HEIGHT: 72 IN | SYSTOLIC BLOOD PRESSURE: 152 MMHG | OXYGEN SATURATION: 98 % | HEART RATE: 64 BPM | TEMPERATURE: 97.8 F | WEIGHT: 198 LBS | BODY MASS INDEX: 26.82 KG/M2

## 2022-03-30 DIAGNOSIS — N52.8 OTHER MALE ERECTILE DYSFUNCTION: ICD-10-CM

## 2022-03-30 DIAGNOSIS — I48.0 PAF (PAROXYSMAL ATRIAL FIBRILLATION) (HCC): ICD-10-CM

## 2022-03-30 DIAGNOSIS — I10 ESSENTIAL HYPERTENSION: ICD-10-CM

## 2022-03-30 DIAGNOSIS — Z12.5 SCREENING FOR PROSTATE CANCER: ICD-10-CM

## 2022-03-30 DIAGNOSIS — G47.00 INSOMNIA, UNSPECIFIED TYPE: ICD-10-CM

## 2022-03-30 DIAGNOSIS — B00.1 COLD SORE: ICD-10-CM

## 2022-03-30 DIAGNOSIS — E11.9 TYPE 2 DIABETES MELLITUS WITHOUT COMPLICATION, WITHOUT LONG-TERM CURRENT USE OF INSULIN (HCC): Primary | ICD-10-CM

## 2022-03-30 DIAGNOSIS — E78.2 MIXED HYPERCHOLESTEROLEMIA AND HYPERTRIGLYCERIDEMIA: ICD-10-CM

## 2022-03-30 LAB — HBA1C MFR BLD: 6.1 %

## 2022-03-30 PROCEDURE — G8484 FLU IMMUNIZE NO ADMIN: HCPCS | Performed by: FAMILY MEDICINE

## 2022-03-30 PROCEDURE — 4040F PNEUMOC VAC/ADMIN/RCVD: CPT | Performed by: FAMILY MEDICINE

## 2022-03-30 PROCEDURE — 83036 HEMOGLOBIN GLYCOSYLATED A1C: CPT | Performed by: FAMILY MEDICINE

## 2022-03-30 PROCEDURE — 2022F DILAT RTA XM EVC RTNOPTHY: CPT | Performed by: FAMILY MEDICINE

## 2022-03-30 PROCEDURE — 1036F TOBACCO NON-USER: CPT | Performed by: FAMILY MEDICINE

## 2022-03-30 PROCEDURE — 3044F HG A1C LEVEL LT 7.0%: CPT | Performed by: FAMILY MEDICINE

## 2022-03-30 PROCEDURE — G8427 DOCREV CUR MEDS BY ELIG CLIN: HCPCS | Performed by: FAMILY MEDICINE

## 2022-03-30 PROCEDURE — G8417 CALC BMI ABV UP PARAM F/U: HCPCS | Performed by: FAMILY MEDICINE

## 2022-03-30 PROCEDURE — 3017F COLORECTAL CA SCREEN DOC REV: CPT | Performed by: FAMILY MEDICINE

## 2022-03-30 PROCEDURE — 1123F ACP DISCUSS/DSCN MKR DOCD: CPT | Performed by: FAMILY MEDICINE

## 2022-03-30 PROCEDURE — 99214 OFFICE O/P EST MOD 30 MIN: CPT | Performed by: FAMILY MEDICINE

## 2022-03-30 RX ORDER — LORAZEPAM 0.5 MG/1
0.5 TABLET ORAL EVERY 6 HOURS PRN
COMMUNITY
End: 2022-03-30 | Stop reason: SDUPTHER

## 2022-03-30 RX ORDER — SILDENAFIL 100 MG/1
100 TABLET, FILM COATED ORAL DAILY PRN
Qty: 30 TABLET | Refills: 5 | Status: SHIPPED | OUTPATIENT
Start: 2022-03-30 | End: 2022-11-02 | Stop reason: SDUPTHER

## 2022-03-30 RX ORDER — LISINOPRIL AND HYDROCHLOROTHIAZIDE 25; 20 MG/1; MG/1
1.5 TABLET ORAL DAILY
Qty: 135 TABLET | Refills: 3 | Status: SHIPPED | OUTPATIENT
Start: 2022-03-30 | End: 2023-03-30

## 2022-03-30 RX ORDER — VALACYCLOVIR HYDROCHLORIDE 500 MG/1
500 TABLET, FILM COATED ORAL 2 TIMES DAILY
Qty: 30 TABLET | Refills: 3 | Status: SHIPPED | OUTPATIENT
Start: 2022-03-30

## 2022-03-30 RX ORDER — LORAZEPAM 0.5 MG/1
0.5 TABLET ORAL EVERY 6 HOURS PRN
Qty: 30 TABLET | Refills: 0 | Status: SHIPPED | OUTPATIENT
Start: 2022-03-30 | End: 2022-04-29

## 2022-03-30 RX ORDER — VALACYCLOVIR HYDROCHLORIDE 500 MG/1
500 TABLET, FILM COATED ORAL 2 TIMES DAILY
COMMUNITY
End: 2022-03-30 | Stop reason: SDUPTHER

## 2022-03-30 SDOH — ECONOMIC STABILITY: FOOD INSECURITY: WITHIN THE PAST 12 MONTHS, THE FOOD YOU BOUGHT JUST DIDN'T LAST AND YOU DIDN'T HAVE MONEY TO GET MORE.: NEVER TRUE

## 2022-03-30 SDOH — ECONOMIC STABILITY: FOOD INSECURITY: WITHIN THE PAST 12 MONTHS, YOU WORRIED THAT YOUR FOOD WOULD RUN OUT BEFORE YOU GOT MONEY TO BUY MORE.: NEVER TRUE

## 2022-03-30 ASSESSMENT — SOCIAL DETERMINANTS OF HEALTH (SDOH): HOW HARD IS IT FOR YOU TO PAY FOR THE VERY BASICS LIKE FOOD, HOUSING, MEDICAL CARE, AND HEATING?: NOT HARD AT ALL

## 2022-03-30 ASSESSMENT — ENCOUNTER SYMPTOMS: SHORTNESS OF BREATH: 0

## 2022-03-30 NOTE — PROGRESS NOTES
Fletcher Garcia (:  1950) is a 71 y.o. male,Established patient, here for evaluation of the following chief complaint(s):  6 Month Follow-Up and Hypertension         ASSESSMENT/PLAN:  1. Type 2 diabetes mellitus without complication, without long-term current use of insulin (HCC)  -     POCT glycosylated hemoglobin (Hb A1C)  -      DIABETES FOOT EXAM  2. PAF (paroxysmal atrial fibrillation) ([facility] [address]  3. Essential hypertension  -     lisinopril-hydroCHLOROthiazide (PRINZIDE;ZESTORETIC) 20-25 MG per tablet; Take 1.5 tablets by mouth daily, Disp-135 tablet, R-3Normal  4. Mixed hypercholesterolemia and hypertriglyceridemia  5. Other male erectile dysfunction  -     sildenafil (VIAGRA) 100 MG tablet; Take 1 tablet by mouth daily as needed for Erectile Dysfunction, Disp-30 tablet, R-5Normal  6. Cold sore  -     valACYclovir (VALTREX) 500 MG tablet; Take 1 tablet by mouth 2 times daily, Disp-30 tablet, R-3Normal  7. Insomnia, unspecified type  -     LORazepam (ATIVAN) 0.5 MG tablet; Take 1 tablet by mouth every 6 hours as needed for Anxiety for up to 30 days. , Disp-30 tablet, R-0Normal  8. Screening for prostate cancer    BP not well controlled. Discussed benefits to his health to control BP. Will do 1.5 tabs of lisinopril/hctz. Encouraged lower sodium diet with good amount of fiber to help BP. Good activity will help. Sleep hygiene reviewed. Lowest dose of ativan encouraged. Cognition eval may be done next visit  Diabetic eye exam encouraged. Return for 1 mo BP check with nurse. 6mo HTN. Subjective   SUBJECTIVE/OBJECTIVE:  HPI     HTN not well controlled for a while  DM doing well. 149/68 to 129/75. Varies a lot at home and [facility]. ED -- stable  Diet -- improving, trying more seafood, vegetables. Watching sugar intake. [place] to like asparagus  Exercise -- walking, rowing machine. Golf to come soon  Sleep --  Some nights okay 4-5 hours. Melatonin helped some. Has tried benadryl at night. Ativan as needed. Will be travelling again out [facility]. Onset of sleep is the biggest issue. Stress management -- he believes it is better. Tolerating medications well   A1c 6.1 today. He is pleased with this. Forgetfulness? Such as keys or misplacing. Recalls peoples names. Has not become lost while driving. Wife has not mentioned anything. Needing refills medication for diabetes, ED and cold sores. Review of Systems   Constitutional: Negative for fatigue and fever. Respiratory: Negative for shortness of breath. Cardiovascular: Negative for chest pain and leg swelling. Objective   Physical Exam  Constitutional:       General: He is not in acute distress. Appearance: Normal appearance. He is not ill-appearing. Cardiovascular:      Rate and Rhythm: Normal rate and regular rhythm. Heart sounds: No murmur heard. Pulmonary:      Effort: Pulmonary effort is normal. No respiratory distress. Breath sounds: Normal breath sounds. No wheezing. Musculoskeletal:         General: No swelling. Neurological:      Mental Status: He is alert and oriented to person, place, and time.    Psychiatric:         Mood and Affect: Mood normal.           BP Readings from Last 20 Encounters:   03/30/22 (!) 152/78   09/29/21 (!) 156/84   07/27/21 (!) 161/96   05/24/21 (!) 150/82   03/31/21 130/74   02/23/21 139/80   01/12/21 128/80   12/09/20 132/80   12/06/20 (!) 158/78   12/03/20 (!) 115/53   10/01/20 118/68   11/25/19 130/64   11/04/19 122/60   05/21/19 120/82   03/26/19 126/84   03/18/19 108/78   11/20/18 130/78   05/22/18 136/88   11/21/17 130/78   05/23/17 (!) 144/82         Wt Readings from Last 3 Encounters:   03/30/22 198 lb (89.8 kg)   09/29/21 193 lb 3.2 oz (87.6 kg)   07/27/21 196 lb (88.9 kg)       Lab Results   Component Value Date    LABA1C 6.1 03/30/2022     No results found for: EAG  Lab Results   Component Value Date    LABA1C 6.1 03/30/2022    LABA1C 6.3 12/07/2021    LABA1C 6.1 03/26/2021       Lab Results   Component Value Date     12/07/2021    K 4.2 12/07/2021    CL 99 12/07/2021    CO2 26 12/07/2021    BUN 33 (H) 12/07/2021    CREATININE 1.2 12/07/2021    CALCIUM 10.1 12/07/2021    GLUCOSE 123 (H) 12/07/2021      Lab Results   Component Value Date    PSA <0.02 03/26/2021    PSA <0.02 11/26/2019    PSA <0.02 11/12/2018       Lab Results   Component Value Date    CHOL 165 12/07/2021    CHOL 170 03/26/2021    CHOL 215 (H) 10/01/2020     Lab Results   Component Value Date    TRIG 128 12/07/2021    TRIG 160 03/26/2021    TRIG 215 (H) 10/01/2020     Lab Results   Component Value Date    HDL 46 12/07/2021    HDL 47 03/26/2021    HDL 46 10/01/2020     Lab Results   Component Value Date    LDLCALC 93 12/07/2021    LDLCALC 91 03/26/2021    LDLCALC 126 10/01/2020     Lab Results   Component Value Date    LABVLDL 34 (H) 11/23/2016    LABVLDL 44 (H) 11/23/2015    LABVLDL 18 11/17/2014     Lab Results   Component Value Date    CHOLHDLRATIO 3.7 11/23/2016    CHOLHDLRATIO 4.5 11/23/2015    CHOLHDLRATIO 3.1 11/17/2014       Lab Results   Component Value Date    LABMICR 6.58 09/22/2021       Lab Results   Component Value Date    TSH 0.378 (L) 12/03/2020      No results found for: [id number]   Lab Results   Component Value Date    MG 2.3 12/05/2020      Lab Results   Component Value Date    ALT 26 12/07/2021    AST 26 12/07/2021    ALKPHOS 67 12/07/2021    BILITOT 0.7 12/07/2021        Lab Results   Component Value Date    WBC 10.0 12/06/2020    HGB 12.2 (L) 12/06/2020    HCT 36.9 (L) 12/06/2020    MCV 95.3 (H) 12/06/2020     12/06/2020     An electronic signature was used to authenticate this note.     --Claudia Willingham MD

## 2022-03-30 NOTE — PATIENT INSTRUCTIONS
Start with 1.5 tablets of lisinopril/hctz orally daily. For night time,   -- consider magnesium threonate, chamomile, valerian root  -- weighted blankets, cool environment. To protect your eyes from damage due to diabetes, please schedule an appointment with your ophthalmologist. Please let us know if you need help doing this.

## 2022-04-27 ENCOUNTER — NURSE ONLY (OUTPATIENT)
Dept: FAMILY MEDICINE CLINIC | Age: 72
End: 2022-04-27

## 2022-04-27 VITALS — DIASTOLIC BLOOD PRESSURE: 78 MMHG | SYSTOLIC BLOOD PRESSURE: 138 MMHG

## 2022-04-27 DIAGNOSIS — I10 ESSENTIAL HYPERTENSION: Primary | ICD-10-CM

## 2022-04-27 NOTE — PROGRESS NOTES
Patient presented to the office today for nurse visit BP check.     Patient's 1st readin/80 -LA                  2nd readin/78 -138/78

## 2022-04-27 NOTE — PROGRESS NOTES
Good BP control.   Will see him in Sept.    Future Appointments   Date Time Provider Cam Hughesi   8/16/2022 10:00 AM MD DONNIE [facility] SARAH BHARDWAJ AM [place] [address]   9/27/2022 10:00 AM MD KIMMIE Peters DELPHOS KARISSA  SARAH BHARDWAJ AM [address] patient

## 2022-04-28 ENCOUNTER — TELEPHONE (OUTPATIENT)
Dept: FAMILY MEDICINE CLINIC | Age: 72
End: 2022-04-28

## 2022-04-28 NOTE — TELEPHONE ENCOUNTER
----- Message from Lior Paul MD sent at 4/27/2022  5:58 PM EDT -----      ----- Message -----  From: Reggie Najjar, MA  Sent: 4/27/2022  10:31 AM EDT  To: Lior Paul MD

## 2022-06-13 ENCOUNTER — TELEMEDICINE (OUTPATIENT)
Dept: FAMILY MEDICINE CLINIC | Age: 72
End: 2022-06-13
Payer: MEDICARE

## 2022-06-13 ENCOUNTER — NURSE ONLY (OUTPATIENT)
Dept: FAMILY MEDICINE CLINIC | Age: 72
End: 2022-06-13

## 2022-06-13 DIAGNOSIS — U07.1 COVID: Primary | ICD-10-CM

## 2022-06-13 DIAGNOSIS — Z20.822 EXPOSURE TO COVID-19 VIRUS: Primary | ICD-10-CM

## 2022-06-13 LAB
Lab: ABNORMAL
QC PASS/FAIL: ABNORMAL
SARS-COV-2 RDRP RESP QL NAA+PROBE: POSITIVE

## 2022-06-13 PROCEDURE — 87635 SARS-COV-2 COVID-19 AMP PRB: CPT | Performed by: FAMILY MEDICINE

## 2022-06-13 PROCEDURE — 1123F ACP DISCUSS/DSCN MKR DOCD: CPT | Performed by: NURSE PRACTITIONER

## 2022-06-13 PROCEDURE — 99213 OFFICE O/P EST LOW 20 MIN: CPT | Performed by: NURSE PRACTITIONER

## 2022-06-13 ASSESSMENT — ENCOUNTER SYMPTOMS
EYE PAIN: 0
RHINORRHEA: 0
WHEEZING: 0
SINUS PRESSURE: 1
DIARRHEA: 0
SORE THROAT: 0
BACK PAIN: 0
EYE REDNESS: 0
SHORTNESS OF BREATH: 0
COUGH: 1
VOMITING: 0
NAUSEA: 0
CHEST TIGHTNESS: 0
EYE ITCHING: 0
CONSTIPATION: 0
TROUBLE SWALLOWING: 0
EYE DISCHARGE: 0
ABDOMINAL PAIN: 0

## 2022-06-13 NOTE — PROGRESS NOTES
Mara Gan (:  1950) is a Established patient, here for evaluation of the following:    Assessment & Plan   Below is the assessment and plan developed based on review of pertinent history, physical exam, labs, studies, and medications. 1. COVID  -     nirmatrelvir/ritonavir (PAXLOVID) 20 x 150 MG & 10 x 100MG; Take 3 tablets (two 150 mg nirmatrelvir and one 100 mg ritonavir tablets) by mouth every 12 hours for 5 days. GFR 60, Disp-30 tablet, R-0Normal    Return if symptoms worsen or fail to improve. Subjective   HPI tested positive for covid over the weekend. Wife diagnosed last week and is finishing Paxlovid. He denies SOB and chest pain. He reports a mild cough, body aches, and low grade temp of 99. He notes he is feeling somewhat better today. Review of Systems   Constitutional: Positive for chills, fatigue and fever. Negative for activity change and appetite change. HENT: Positive for sinus pressure. Negative for congestion, ear discharge, ear pain, hearing loss, postnasal drip, rhinorrhea, sore throat and trouble swallowing. Eyes: Negative for pain, discharge, redness and itching. Respiratory: Positive for cough. Negative for chest tightness, shortness of breath and wheezing. Cardiovascular: Negative for chest pain, palpitations and leg swelling. Gastrointestinal: Negative for abdominal pain, constipation, diarrhea, nausea and vomiting. Endocrine: Negative. Genitourinary: Negative for dysuria, flank pain, frequency and hematuria. Musculoskeletal: Positive for myalgias. Negative for arthralgias, back pain and joint swelling. Skin: Negative. Neurological: Negative for dizziness, light-headedness and headaches. Hematological: Negative.            Objective   Patient-Reported Vitals  No data recorded     Physical Exam  Constitutional: [x] Appears well-developed and well-nourished [x] No apparent distress      [] Abnormal -     Mental status: [x] Alert and awake  [x] Oriented to person/place/time [x] Able to follow commands    [] Abnormal -     Eyes:   EOM    [x]  Normal    [] Abnormal -   Sclera  [x]  Normal    [] Abnormal -          Discharge [x]  None visible   [] Abnormal -     HENT: [x] Normocephalic, atraumatic  [] Abnormal -   [x] Mouth/Throat: Mucous membranes are moist     Pulmonary/Chest: [x] Respiratory effort normal   [x] No visualized signs of difficulty breathing or respiratory distress        [] Abnormal -        Skin:        [x] No significant exanthematous lesions or discoloration noted on facial skin         [] Abnormal -            Psychiatric:       [x] Normal Affect [] Abnormal -        [x] No Hallucinations    Other pertinent observable physical exam findings:-                 Miladys Galarza, was evaluated through a synchronous (real-time) audio-video encounter. The patient (or guardian if applicable) is aware that this is a billable service, which includes applicable co-pays. This Virtual Visit was conducted with patient's (and/or legal guardian's) consent. The visit was conducted pursuant to the emergency declaration under the [facility], [address] [place] [address] [facility] authority and the [employer] and [employer] General Act. Patient identification was verified, and a caregiver was present when appropriate. The patient was located at Home: [place] [address]. Provider was located at [place] [address] ([address] [place] [address]): [address] [place] [address], CAROLINE [facility]

## 2022-06-13 NOTE — PROGRESS NOTES
Lisset Newsome arrived at our office for a drive-up [id number] test swab to be obtained. Specimen was collected with no complications and processed in this clinic's lab. Results were Positive for COVID-19.

## 2022-08-14 NOTE — PROGRESS NOTES
[facility] [address]  [place] [address]  Dept: [phone]  Dept Fax: [phone]  Loc: [phone]    Visit Date: 8/16/2022    Mr. Mani Mann is a 71 y.o. male  who presented for:    Atrial fibrillation  HTN    HPI:   LUCY López is a pleasant 71year old male patient who  has a past medical history of Gout, Hyperlipidemia, Hypertension, and Prostate cancer ([facility] [address] On 12/2020, the patient was admitted for sepsis, testicular contusion, required left orchiectomy. During his hospital stay, the patient was diagnosed with atrial flutter RVR, underwent MONIQUE guided DCCV. Cardiac event monitor 01/2021 revealed NSR and only one 1 minute run of narrow complex tachycardia/SVT. No AF detected. Per patient wishes, [employer] was stopped, stroke risk was discussed. His Lisinopril was increased by his PCP due to uncontrolled HTN. He is complaint with his medications. Has occasional dizziness. Patient denies chest pain, shortness of breath, dyspnea on exertion, orthopnea, paroxysmal nocturnal dyspnea, palpitations, syncope, weight gain or leg swelling. /75. Home SBP reading up to 145 mmHg per patient.        Current Outpatient Medications:     sildenafil (VIAGRA) 100 MG tablet, Take 1 tablet by mouth daily as needed for Erectile Dysfunction, Disp: 30 tablet, Rfl: 5    valACYclovir (VALTREX) 500 MG tablet, Take 1 tablet by mouth 2 times daily, Disp: 30 tablet, Rfl: 3    lisinopril-hydroCHLOROthiazide (PRINZIDE;ZESTORETIC) 20-25 MG per tablet, Take 1.5 tablets by mouth daily, Disp: 135 tablet, Rfl: 3    metoprolol tartrate (LOPRESSOR) 25 MG tablet, Take 1 tablet by mouth 2 times daily, Disp: 180 tablet, Rfl: 1    simvastatin (ZOCOR) 10 MG tablet, Take 1 tablet by mouth nightly, Disp: 90 tablet, Rfl: 3    aspirin EC 81 MG EC tablet, Take 1 tablet by mouth daily, Disp: 90 tablet, Rfl: 1    Glucosamine-Chondroit-Vit C-Mn (GLUCOSAMINE 1500 COMPLEX PO), Take 1 capsule by mouth daily , Disp: , Rfl:     Probiotic Product (PROBIOTIC DAILY PO), Take 1 tablet by mouth daily , Disp: , Rfl:     Ascorbic Acid (VITAMIN C) 500 MG tablet, Take 500 mg by mouth daily. , Disp: , Rfl:     fish oil-omega-3 fatty acids 1000 MG capsule, Take 1 g by mouth daily , Disp: , Rfl:     Coenzyme Q10 (CO Q 10) 100 MG CAPS, Take 200 mg by mouth daily. , Disp: , Rfl:     Past Medical History  John Alex  has a past medical history of Gout, Hyperlipidemia, Hypertension, and Prostate cancer ([facility] [address] Social History  John Alex  reports that he quit smoking about 5 years ago. His smoking use included cigarettes. He started smoking about 42 years ago. He has a 7.50 pack-year smoking history. He has never used smokeless tobacco. He reports current alcohol use. He reports that he does not use drugs. Family History  John Alex family history includes Cancer (age of onset: 80) in his father and mother. Past Surgical History   Past Surgical History:   Procedure Laterality Date    COLONOSCOPY      JOINT REPLACEMENT      right hip    IN EXPLORE SCROTUM Left 12/3/2020    LEFT TESTICLE  EXPLORATION AND [address] OUT performed by Layla Tracy MD at [address]  2010    brachytherapy    TONSILLECTOMY      TOTAL HIP ARTHROPLASTY      right       Review of Systems   Constitutional: Negative for chills and fever  HENT: Negative for congestion, sinus pressure, sneezing and sore throat. Eyes: Negative for pain, discharge, redness and itching. Respiratory: Negative for apnea, cough  Gastrointestinal: Negative for blood in stool, constipation, diarrhea   Endocrine: Negative for cold intolerance, heat intolerance, polydipsia. Genitourinary: Negative for dysuria, enuresis, flank pain and hematuria. Musculoskeletal: Negative for arthralgias, joint swelling and neck pain. Neurological: Negative for numbness and headaches.    Psychiatric/Behavioral: Negative for agitation, confusion, decreased concentration and dysphoric mood. Objective: There were no vitals taken for this visit. Wt Readings from Last 3 Encounters:   03/30/22 198 lb (89.8 kg)   09/29/21 193 lb 3.2 oz (87.6 kg)   07/27/21 196 lb (88.9 kg)     BP Readings from Last 3 Encounters:   04/27/22 138/78   03/30/22 (!) 152/78   09/29/21 (!) 156/84       Nursing note and vitals reviewed. Physical Exam   Constitutional: Oriented to person, place, and time. Appears well-developed and well-nourished. ENT: Moist mucous membranes. No bleeding. Tongue is midline. Head: Normocephalic and atraumatic. Eyes: EOM are normal. Pupils are equal, round, and reactive to light. Neck: Normal range of motion. Neck supple. No JVD present. Cardiovascular: Normal rate, regular rhythm, systolic murmur, no rubs, and intact distal pulses. Pulmonary/Chest: Effort normal and breath sounds normal. No respiratory distress. No wheezes. No rales. Abdominal: Soft. Bowel sounds are normal. No distension. There is no tenderness. Musculoskeletal: Normal range of motion. no edema. Neurological: Alert and oriented to person, place, and time. No cranial nerve deficit. Coordination normal.   Skin: Skin is warm and dry. Psychiatric: Normal mood and affect.        No results found for: CKTOTAL, CKMB, CKMBINDEX    Lab Results   Component Value Date/Time    WBC 10.0 12/06/2020 05:12 AM    RBC 3.87 12/06/2020 05:12 AM    HGB 12.2 12/06/2020 05:12 AM    HCT 36.9 12/06/2020 05:12 AM    MCV 95.3 12/06/2020 05:12 AM    MCH 31.5 12/06/2020 05:12 AM    MCHC 33.1 12/06/2020 05:12 AM    RDW 13.3 12/02/2020 09:10 AM     12/06/2020 05:12 AM    MPV 9.9 12/06/2020 05:12 AM       Lab Results   Component Value Date/Time     12/07/2021 08:32 AM    K 4.2 12/07/2021 08:32 AM    CL 99 12/07/2021 08:32 AM    CO2 26 12/07/2021 08:32 AM    BUN 33 12/07/2021 08:32 AM    LABALBU 4.4 12/07/2021 08:32 AM    CREATININE 1.2 12/07/2021 08:32 AM    CALCIUM 10.1 12/07/2021 08:32 AM    LABGLOM 60 12/07/2021 08:32 AM    GLUCOSE 123 12/07/2021 08:32 AM    GLUCOSE 109 11/23/2016 08:45 AM       Lab Results   Component Value Date/Time    ALKPHOS 67 12/07/2021 08:32 AM    ALT 26 12/07/2021 08:32 AM    AST 26 12/07/2021 08:32 AM    PROT 6.9 12/07/2021 08:32 AM    BILITOT 0.7 12/07/2021 08:32 AM    LABALBU 4.4 12/07/2021 08:32 AM       Lab Results   Component Value Date/Time    MG 2.3 12/05/2020 03:50 AM       No results found for: INR, PROTIME      Lab Results   Component Value Date/Time    LABA1C 6.1 03/30/2022 10:05 AM       Lab Results   Component Value Date/Time    TRIG 128 12/07/2021 08:32 AM    HDL 46 12/07/2021 08:32 AM    LDLCALC 93 12/07/2021 08:32 AM    LABVLDL 34 11/23/2016 08:45 AM       Lab Results   Component Value Date/Time    TSH 0.378 12/03/2020 05:55 PM         Testing Reviewed:      I have individually reviewed the cardiac test below:    ECHO: No results found for this or any previous visit. AssessmentPlan:   Daylin Carpio is a pleasant 71year old male patient who  has a past medical history of Gout, Hyperlipidemia, Hypertension, and Prostate cancer ([facility] [address] On 12/2020, the patient was admitted for sepsis, testicular contusion, required left orchiectomy. During his hospital stay, the patient was diagnosed with atrial flutter RVR, underwent MONIQUE guided DCCV. Cardiac event monitor 01/2021 revealed NSR and only one 1 minute run of narrow complex tachycardia/SVT. No AF detected. Per patient wishes, [address] was stopped, stroke risk was discussed. His Lisinopril was increased by his PCP due to uncontrolled HTN. He is complaint with his medications. Has occasional dizziness. Patient denies chest pain, shortness of breath, dyspnea on exertion, orthopnea, paroxysmal nocturnal dyspnea, palpitations, syncope, weight gain or leg swelling. /75. Home SBP reading up to 145 mmHg per patient.    Post operative AF *1 episode   Paroxysmal atrial flutter (diagnosed post orchiectomy 12/2020)  Chadsvasc score 2  Cardiac event monitor (only one minute run of SVT detected)   HTN  Dyslipidemia     Cardiac event monitor 01/2021 revealed NSR and only one 1 minute run of narrow complex tachycardia/SVT  No AF detected. Per patient wishes, [address] was stopped, stroke risk was discussed, he continues to refuse [address]  ASA  /75  Metoprolol   Lisinopril/HCTZ   The patient was instructed to check the blood pressure at home, and record the readings. Patient will call office with blood pressure readings, will adjust patient's antihypertensive medications as needed accordingly   On 12/2021, LDL was 93  On Zocor   Hyperlipidemia: on statins, followed periodically. Patient need periodic lipid and liver profile   DENIES PALPITATIONS     Above findings and plan of care were discussed with patient in details, patient's questions were answered.      Disposition:  RTC in 12 months             Electronically signed by Arnold Jean MD, [facility]    8/14/2022 at 7:43 PM EDT

## 2022-08-16 ENCOUNTER — OFFICE VISIT (OUTPATIENT)
Dept: CARDIOLOGY CLINIC | Age: 72
End: 2022-08-16
Payer: COMMERCIAL

## 2022-08-16 VITALS
DIASTOLIC BLOOD PRESSURE: 62 MMHG | SYSTOLIC BLOOD PRESSURE: 112 MMHG | HEIGHT: 72 IN | WEIGHT: 186.8 LBS | BODY MASS INDEX: 25.3 KG/M2 | HEART RATE: 67 BPM

## 2022-08-16 DIAGNOSIS — R42 DIZZINESS: ICD-10-CM

## 2022-08-16 DIAGNOSIS — I10 ESSENTIAL HYPERTENSION: Primary | ICD-10-CM

## 2022-08-16 PROCEDURE — 99214 OFFICE O/P EST MOD 30 MIN: CPT | Performed by: INTERNAL MEDICINE

## 2022-08-16 PROCEDURE — 1036F TOBACCO NON-USER: CPT | Performed by: INTERNAL MEDICINE

## 2022-08-16 PROCEDURE — G8417 CALC BMI ABV UP PARAM F/U: HCPCS | Performed by: INTERNAL MEDICINE

## 2022-08-16 PROCEDURE — 1123F ACP DISCUSS/DSCN MKR DOCD: CPT | Performed by: INTERNAL MEDICINE

## 2022-08-16 PROCEDURE — G8427 DOCREV CUR MEDS BY ELIG CLIN: HCPCS | Performed by: INTERNAL MEDICINE

## 2022-08-16 PROCEDURE — 3017F COLORECTAL CA SCREEN DOC REV: CPT | Performed by: INTERNAL MEDICINE

## 2022-08-16 PROCEDURE — 93000 ELECTROCARDIOGRAM COMPLETE: CPT | Performed by: INTERNAL MEDICINE

## 2022-11-02 ENCOUNTER — OFFICE VISIT (OUTPATIENT)
Dept: FAMILY MEDICINE CLINIC | Age: 72
End: 2022-11-02
Payer: MEDICARE

## 2022-11-02 VITALS
BODY MASS INDEX: 25.81 KG/M2 | TEMPERATURE: 97 F | OXYGEN SATURATION: 98 % | RESPIRATION RATE: 14 BRPM | SYSTOLIC BLOOD PRESSURE: 130 MMHG | HEIGHT: 72 IN | HEART RATE: 72 BPM | WEIGHT: 190.6 LBS | DIASTOLIC BLOOD PRESSURE: 70 MMHG

## 2022-11-02 DIAGNOSIS — N52.8 OTHER MALE ERECTILE DYSFUNCTION: ICD-10-CM

## 2022-11-02 DIAGNOSIS — E11.9 TYPE 2 DIABETES MELLITUS WITHOUT COMPLICATION, WITHOUT LONG-TERM CURRENT USE OF INSULIN (HCC): ICD-10-CM

## 2022-11-02 DIAGNOSIS — Z85.46 H/O PROSTATE CANCER: ICD-10-CM

## 2022-11-02 DIAGNOSIS — Z12.5 SCREENING FOR PROSTATE CANCER: ICD-10-CM

## 2022-11-02 DIAGNOSIS — E78.2 MIXED HYPERCHOLESTEROLEMIA AND HYPERTRIGLYCERIDEMIA: ICD-10-CM

## 2022-11-02 DIAGNOSIS — I10 PRIMARY HYPERTENSION: Primary | ICD-10-CM

## 2022-11-02 DIAGNOSIS — Z23 NEED FOR INFLUENZA VACCINATION: ICD-10-CM

## 2022-11-02 PROCEDURE — 1123F ACP DISCUSS/DSCN MKR DOCD: CPT | Performed by: FAMILY MEDICINE

## 2022-11-02 PROCEDURE — 3074F SYST BP LT 130 MM HG: CPT | Performed by: FAMILY MEDICINE

## 2022-11-02 PROCEDURE — 99214 OFFICE O/P EST MOD 30 MIN: CPT | Performed by: FAMILY MEDICINE

## 2022-11-02 PROCEDURE — G0008 ADMIN INFLUENZA VIRUS VAC: HCPCS | Performed by: FAMILY MEDICINE

## 2022-11-02 PROCEDURE — 90694 VACC AIIV4 NO PRSRV 0.5ML IM: CPT | Performed by: FAMILY MEDICINE

## 2022-11-02 PROCEDURE — 3078F DIAST BP <80 MM HG: CPT | Performed by: FAMILY MEDICINE

## 2022-11-02 PROCEDURE — G8417 CALC BMI ABV UP PARAM F/U: HCPCS | Performed by: FAMILY MEDICINE

## 2022-11-02 PROCEDURE — 2022F DILAT RTA XM EVC RTNOPTHY: CPT | Performed by: FAMILY MEDICINE

## 2022-11-02 PROCEDURE — G8427 DOCREV CUR MEDS BY ELIG CLIN: HCPCS | Performed by: FAMILY MEDICINE

## 2022-11-02 PROCEDURE — G8484 FLU IMMUNIZE NO ADMIN: HCPCS | Performed by: FAMILY MEDICINE

## 2022-11-02 PROCEDURE — 1036F TOBACCO NON-USER: CPT | Performed by: FAMILY MEDICINE

## 2022-11-02 PROCEDURE — 3044F HG A1C LEVEL LT 7.0%: CPT | Performed by: FAMILY MEDICINE

## 2022-11-02 PROCEDURE — 3017F COLORECTAL CA SCREEN DOC REV: CPT | Performed by: FAMILY MEDICINE

## 2022-11-02 RX ORDER — ASPIRIN 81 MG/1
81 TABLET ORAL DAILY
Qty: 90 TABLET | Refills: 3 | Status: SHIPPED | OUTPATIENT
Start: 2022-11-02

## 2022-11-02 RX ORDER — SILDENAFIL 100 MG/1
100 TABLET, FILM COATED ORAL DAILY PRN
Qty: 30 TABLET | Refills: 5 | Status: SHIPPED | OUTPATIENT
Start: 2022-11-02

## 2022-11-02 RX ORDER — SIMVASTATIN 10 MG
10 TABLET ORAL NIGHTLY
Qty: 90 TABLET | Refills: 3 | Status: SHIPPED | OUTPATIENT
Start: 2022-11-02

## 2022-11-02 ASSESSMENT — PATIENT HEALTH QUESTIONNAIRE - PHQ9
SUM OF ALL RESPONSES TO PHQ QUESTIONS 1-9: 0
5. POOR APPETITE OR OVEREATING: 0
8. MOVING OR SPEAKING SO SLOWLY THAT OTHER PEOPLE COULD HAVE NOTICED. OR THE OPPOSITE, BEING SO FIGETY OR RESTLESS THAT YOU HAVE BEEN MOVING AROUND A LOT MORE THAN USUAL: 0
SUM OF ALL RESPONSES TO PHQ QUESTIONS 1-9: 0
7. TROUBLE CONCENTRATING ON THINGS, SUCH AS READING THE NEWSPAPER OR WATCHING TELEVISION: 0
SUM OF ALL RESPONSES TO PHQ9 QUESTIONS 1 & 2: 0
10. IF YOU CHECKED OFF ANY PROBLEMS, HOW DIFFICULT HAVE THESE PROBLEMS MADE IT FOR YOU TO DO YOUR WORK, TAKE CARE OF THINGS AT HOME, OR GET ALONG WITH OTHER PEOPLE: 0
6. FEELING BAD ABOUT YOURSELF - OR THAT YOU ARE A FAILURE OR HAVE LET YOURSELF OR YOUR FAMILY DOWN: 0
SUM OF ALL RESPONSES TO PHQ QUESTIONS 1-9: 0
2. FEELING DOWN, DEPRESSED OR HOPELESS: 0
3. TROUBLE FALLING OR STAYING ASLEEP: 0
SUM OF ALL RESPONSES TO PHQ QUESTIONS 1-9: 0
4. FEELING TIRED OR HAVING LITTLE ENERGY: 0
1. LITTLE INTEREST OR PLEASURE IN DOING THINGS: 0
9. THOUGHTS THAT YOU WOULD BE BETTER OFF DEAD, OR OF HURTING YOURSELF: 0

## 2022-11-02 NOTE — PROGRESS NOTES
Vaccine Information Sheet, \"Influenza - Inactivated\"  given to Jessica William, or parent/legal guardian of  Jessica William and verbalized understanding. Patient responses:    Have you ever had a reaction to a flu vaccine? No  Do you have an allergy to eggs, neomycin or polymixin? No  Do you have an allergy to Thimerosal, contact lens solution, or Merthiolate? No  Have you ever had [place] Syndrome? No  Do you have any current illness? No  Do you have a temperature above 100 degrees? No  Are you pregnant? No  If pregnant, permission obtained from physician? Do you have an active neurological disorder? No      Flu vaccine given per order. Please see immunization tab.

## 2022-11-02 NOTE — PROGRESS NOTES
Rainer Morrow (:  1950) is a 71 y.o. male,Established patient, here for evaluation of the following chief complaint(s):  Diabetes (6 month follow up) and Health Maintenance (Flu vaccine pended)         ASSESSMENT/PLAN:  1. Primary hypertension  -     aspirin EC 81 MG EC tablet; Take 1 tablet by mouth daily, Disp-90 tablet, R-3Normal  -     Comprehensive Metabolic Panel; Future  -     Microalbumin / Creatinine Urine Ratio; Future  -     Hemoglobin A1C; Future  -     CBC with Auto Differential; Future  -     Lipid Panel; Future  2. Need for influenza vaccination  -     Influenza, FLUAD, (age 72 y+), IM, Preservative Free, 0.5 mL  3. Other male erectile dysfunction  -     sildenafil (VIAGRA) 100 MG tablet; Take 1 tablet by mouth daily as needed for Erectile Dysfunction, Disp-30 tablet, R-5Normal  4. Mixed hypercholesterolemia and hypertriglyceridemia  -     simvastatin (ZOCOR) 10 MG tablet; Take 1 tablet by mouth nightly, Disp-90 tablet, R-3Normal  5. H/O prostate cancer  6. Type 2 diabetes mellitus without complication, without long-term current use of insulin (HCC)  -     aspirin EC 81 MG EC tablet; Take 1 tablet by mouth daily, Disp-90 tablet, R-3Normal  -     Comprehensive Metabolic Panel; Future  -     Microalbumin / Creatinine Urine Ratio; Future  -     Hemoglobin A1C; Future  -     CBC with Auto Differential; Future  -     Lipid Panel; Future  7. Screening for prostate cancer  -     PSA Screening; Future    Continue on current medication. Encouraged healthy diet and activity levels. Putting current information together with the data available, I do not see any concern for cancer. Cancer prevention via healthy lifestyles is advised. Continue monitoring with history of prostate cancer. Return in about 6 months (around 2023). Subjective   SUBJECTIVE/OBJECTIVE:    HPI    Patient following for chronic conditions.   He has been concerned about his sisters health and been somewhat active but he is noticed some weight loss. Concern about the big \"C\". No night sweats. Good energy,  Some trouble sleeping -- trying to do less medications. Reading helps him to sleep, then up every 2 hours. Doesn't wake up tired however. Skipped meals as appetite sometimes is down    Neck arthritis -- dull ache. Awakened him at night.   nsaids helpful somewhat. Blood pressure is well controlled. Diabetes mellitus type 2 doing well. High cholesterol appropriately treated  H/o prostate cancer. Not have any significant symptoms there. Lab Results   Component Value Date    PSA <0.02 03/26/2021    PSA <0.02 11/26/2019    PSA <0.02 11/12/2018     BP Readings from Last 3 Encounters:   11/02/22 130/70   08/16/22 112/62   04/27/22 138/78     Wt Readings from Last 3 Encounters:   11/02/22 190 lb 9.6 oz (86.5 kg)   08/16/22 186 lb 12.8 oz (84.7 kg)   03/30/22 198 lb (89.8 kg)     Immunization History   Administered Date(s) Administered    COVID-19, MODERNA Bivalent BOOSTER, (age 12y+), IM, 48 mcg/0.5 mL 10/05/2022    COVID-19, PFIZER PURPLE top, DILUTE for use, (age 15 y+), 30mcg/0.3mL 02/11/2021, 03/04/2021, 09/30/2021    Hepatitis B 01/29/1993, 03/04/1993, 06/29/1993    Influenza Virus Vaccine 11/25/2014, 12/01/2015, 11/21/2017, 11/04/2019    Influenza, AFLURIA (age 1 yrs+), FLUZONE, (age 10 mo+), MDV, 0.5mL 11/21/2017, 11/04/2019, 10/12/2020    Influenza, AFLURIA, FLUZONE, (age 10-32 m), PF 10/12/2020    Influenza, FLUAD, (age 72 y+), Adjuvanted, 0.5mL 12/07/2021    Influenza, High Dose (Fluzone 65 yrs and older) 11/20/2018    Pneumococcal Conjugate 13-valent ([id number]) 05/23/2017    Pneumococcal Polysaccharide (Fmovqqoke22) 11/20/2018    Tdap (Boostrix, Adacel) 11/25/2014    Zoster Live (Zostavax) 01/15/2015    Zoster Recombinant (Shingrix) 08/10/2022     Review of Systems   Constitutional:  Negative for fatigue and fever. Respiratory:  Negative for shortness of breath.     Cardiovascular:  Negative for chest pain and leg swelling. Objective   Physical Exam  Constitutional:       General: He is not in acute distress. Appearance: Normal appearance. He is not ill-appearing. Neck:      Vascular: No carotid bruit. Cardiovascular:      Rate and Rhythm: Normal rate and regular rhythm. Heart sounds: No murmur heard. Pulmonary:      Effort: Pulmonary effort is normal. No respiratory distress. Breath sounds: Normal breath sounds. No wheezing. Musculoskeletal:         General: No swelling. Neurological:      Mental Status: He is alert and oriented to person, place, and time. Psychiatric:         Mood and Affect: Mood normal.         Behavior: Behavior normal.        No visits with results within 3 Month(s) from this visit.    Latest known visit with results is:   Nurse Only on 06/13/2022   Component Date Value Ref Range Status    SARS-COV-2, RdRp gene 06/13/2022 Positive (A)  Negative Final    Lot Number 06/13/2022 [id number]   Final    QC Pass/Fail 06/13/2022 pass   Final    Diabetes labs reviewed:  Lab Results   Component Value Date    LABA1C 6.1 03/30/2022     No results found for: EAG  Lab Results   Component Value Date    LABA1C 6.1 03/30/2022    LABA1C 6.3 12/07/2021    LABA1C 6.1 03/26/2021       Lab Results   Component Value Date     12/07/2021    K 4.2 12/07/2021    CL 99 12/07/2021    CO2 26 12/07/2021    BUN 33 (H) 12/07/2021    CREATININE 1.2 12/07/2021    CALCIUM 10.1 12/07/2021    GLUCOSE 123 (H) 12/07/2021        Lab Results   Component Value Date    CHOL 165 12/07/2021    CHOL 170 03/26/2021    CHOL 215 (H) 10/01/2020     Lab Results   Component Value Date    TRIG 128 12/07/2021    TRIG 160 03/26/2021    TRIG 215 (H) 10/01/2020     Lab Results   Component Value Date    HDL 46 12/07/2021    HDL 47 03/26/2021    HDL 46 10/01/2020     Lab Results   Component Value Date    LDLCALC 93 12/07/2021    LDLCALC 91 03/26/2021    LDLCALC 126 10/01/2020     Lab Results   Component Value Date LABVLDL 34 (H) 11/23/2016    LABVLDL 44 (H) 11/23/2015    LABVLDL 18 11/17/2014     Lab Results   Component Value Date    CHOLHDLRATIO 3.7 11/23/2016    CHOLHDLRATIO 4.5 11/23/2015    CHOLHDLRATIO 3.1 11/17/2014       Lab Results   Component Value Date    LABMICR 6.58 09/22/2021       Lab Results   Component Value Date    TSH 0.378 (L) 12/03/2020      No results found for: Celestino Jordan   Lab Results   Component Value Date    MG 2.3 12/05/2020      Lab Results   Component Value Date    ALT 26 12/07/2021    AST 26 12/07/2021    ALKPHOS 67 12/07/2021    BILITOT 0.7 12/07/2021        Lab Results   Component Value Date    WBC 10.0 12/06/2020    HGB 12.2 (L) 12/06/2020    HCT 36.9 (L) 12/06/2020    MCV 95.3 (H) 12/06/2020     12/06/2020             An electronic signature was used to authenticate this note.     --Tania Durham MD

## 2022-11-05 ASSESSMENT — ENCOUNTER SYMPTOMS: SHORTNESS OF BREATH: 0

## 2022-11-08 ENCOUNTER — HOSPITAL ENCOUNTER (OUTPATIENT)
Age: 72
Discharge: HOME | End: 2022-11-08
Payer: MEDICARE

## 2022-11-08 DIAGNOSIS — Z12.5 SCREENING FOR PROSTATE CANCER: ICD-10-CM

## 2022-11-08 DIAGNOSIS — I10 PRIMARY HYPERTENSION: ICD-10-CM

## 2022-11-08 DIAGNOSIS — E11.9 TYPE 2 DIABETES MELLITUS WITHOUT COMPLICATION, WITHOUT LONG-TERM CURRENT USE OF INSULIN (HCC): ICD-10-CM

## 2022-11-08 LAB
ALBUMIN SERPL-MCNC: 4.3 G/DL (ref 3.5–5.1)
ALP BLD-CCNC: 50 U/L (ref 38–126)
ALT SERPL-CCNC: 24 U/L (ref 11–66)
ANION GAP SERPL CALCULATED.3IONS-SCNC: 10 MEQ/L (ref 8–16)
AST SERPL-CCNC: 27 U/L (ref 5–40)
AVERAGE GLUCOSE: 135 MG/DL (ref 70–126)
BASOPHILS # BLD: 0.1 %
BASOPHILS ABSOLUTE: 0 THOU/MM3 (ref 0–0.1)
BILIRUB SERPL-MCNC: 0.5 MG/DL (ref 0.3–1.2)
BUN BLDV-MCNC: 27 MG/DL (ref 7–22)
CALCIUM SERPL-MCNC: 9.4 MG/DL (ref 8.5–10.5)
CHLORIDE BLD-SCNC: 104 MEQ/L (ref 98–111)
CHOLESTEROL, TOTAL: 175 MG/DL (ref 100–199)
CO2: 28 MEQ/L (ref 23–33)
CREAT SERPL-MCNC: 1 MG/DL (ref 0.4–1.2)
CREATININE, URINE: 143.6 MG/DL
EOSINOPHIL # BLD: 2 %
EOSINOPHILS ABSOLUTE: 0.2 THOU/MM3 (ref 0–0.4)
ERYTHROCYTE [DISTWIDTH] IN BLOOD BY AUTOMATED COUNT: 12.6 % (ref 11.5–14.5)
ERYTHROCYTE [DISTWIDTH] IN BLOOD BY AUTOMATED COUNT: 44.2 FL (ref 35–45)
GFR SERPL CREATININE-BSD FRML MDRD: > 60 ML/MIN/1.73M2
GLUCOSE BLD-MCNC: 118 MG/DL (ref 70–108)
HBA1C MFR BLD: 6.5 % (ref 4.4–6.4)
HCT VFR BLD CALC: 44.8 % (ref 42–52)
HDLC SERPL-MCNC: 41 MG/DL
HEMOGLOBIN: 14.7 GM/DL (ref 14–18)
IMMATURE GRANS (ABS): 0.04 THOU/MM3 (ref 0–0.07)
IMMATURE GRANULOCYTES: 0.5 %
LDL CHOLESTEROL CALCULATED: 90 MG/DL
LYMPHOCYTES # BLD: 30.4 %
LYMPHOCYTES ABSOLUTE: 2.3 THOU/MM3 (ref 1–4.8)
MCH RBC QN AUTO: 31.3 PG (ref 26–33)
MCHC RBC AUTO-ENTMCNC: 32.8 GM/DL (ref 32.2–35.5)
MCV RBC AUTO: 95.3 FL (ref 80–94)
MICROALBUMIN UR-MCNC: 4.38 MG/DL
MICROALBUMIN/CREAT UR-RTO: 31 MG/G (ref 0–30)
MONOCYTES # BLD: 8.8 %
MONOCYTES ABSOLUTE: 0.7 THOU/MM3 (ref 0.4–1.3)
NUCLEATED RED BLOOD CELLS: 0 /100 WBC
PLATELET # BLD: 323 THOU/MM3 (ref 130–400)
PMV BLD AUTO: 9.8 FL (ref 9.4–12.4)
POTASSIUM SERPL-SCNC: 4.3 MEQ/L (ref 3.5–5.2)
PROSTATE SPECIFIC ANTIGEN: < 0.02 NG/ML (ref 0–1)
RBC # BLD: 4.7 MILL/MM3 (ref 4.7–6.1)
SEG NEUTROPHILS: 58.2 %
SEGMENTED NEUTROPHILS ABSOLUTE COUNT: 4.4 THOU/MM3 (ref 1.8–7.7)
SODIUM BLD-SCNC: 142 MEQ/L (ref 135–145)
TOTAL PROTEIN: 6.1 G/DL (ref 6.1–8)
TRIGL SERPL-MCNC: 220 MG/DL (ref 0–199)
WBC # BLD: 7.6 THOU/MM3 (ref 4.8–10.8)

## 2022-11-08 PROCEDURE — 83036 HEMOGLOBIN GLYCOSYLATED A1C: CPT

## 2022-11-08 PROCEDURE — 80061 LIPID PANEL: CPT

## 2022-11-08 PROCEDURE — 36415 COLL VENOUS BLD VENIPUNCTURE: CPT

## 2022-11-08 PROCEDURE — G0103 PSA SCREENING: HCPCS

## 2022-11-08 PROCEDURE — 80053 COMPREHEN METABOLIC PANEL: CPT

## 2022-11-08 PROCEDURE — 85025 COMPLETE CBC W/AUTO DIFF WBC: CPT

## 2022-11-08 PROCEDURE — 82043 UR ALBUMIN QUANTITATIVE: CPT

## 2023-04-11 ENCOUNTER — HOSPITAL ENCOUNTER (INPATIENT)
Age: 73
LOS: 1 days | Discharge: HOME | DRG: 309 | End: 2023-04-14
Attending: EMERGENCY MEDICINE | Admitting: FAMILY MEDICINE
Payer: MEDICARE

## 2023-04-11 ENCOUNTER — APPOINTMENT (OUTPATIENT)
Dept: GENERAL RADIOLOGY | Age: 73
DRG: 309 | End: 2023-04-11
Payer: MEDICARE

## 2023-04-11 DIAGNOSIS — I48.0 PAROXYSMAL ATRIAL FIBRILLATION (HCC): ICD-10-CM

## 2023-04-11 DIAGNOSIS — R06.00 DYSPNEA, UNSPECIFIED TYPE: ICD-10-CM

## 2023-04-11 DIAGNOSIS — I48.91 ATRIAL FIBRILLATION WITH RVR (HCC): Primary | ICD-10-CM

## 2023-04-11 DIAGNOSIS — I10 ESSENTIAL HYPERTENSION: ICD-10-CM

## 2023-04-11 LAB
ALBUMIN SERPL BCG-MCNC: 4.5 G/DL (ref 3.5–5.1)
ALP SERPL-CCNC: 58 U/L (ref 38–126)
ALT SERPL W/O P-5'-P-CCNC: 28 U/L (ref 11–66)
ANION GAP SERPL CALC-SCNC: 15 MEQ/L (ref 8–16)
AST SERPL-CCNC: 37 U/L (ref 5–40)
BASOPHILS ABSOLUTE: 0 THOU/MM3 (ref 0–0.1)
BASOPHILS NFR BLD AUTO: 0.1 %
BILIRUB SERPL-MCNC: 1.4 MG/DL (ref 0.3–1.2)
BUN SERPL-MCNC: 41 MG/DL (ref 7–22)
CALCIUM SERPL-MCNC: 9.9 MG/DL (ref 8.5–10.5)
CHLORIDE SERPL-SCNC: 99 MEQ/L (ref 98–111)
CO2 SERPL-SCNC: 21 MEQ/L (ref 23–33)
CREAT SERPL-MCNC: 1.6 MG/DL (ref 0.4–1.2)
DEPRECATED RDW RBC AUTO: 42.4 FL (ref 35–45)
EOSINOPHIL NFR BLD AUTO: 0.1 %
EOSINOPHILS ABSOLUTE: 0 THOU/MM3 (ref 0–0.4)
ERYTHROCYTE [DISTWIDTH] IN BLOOD BY AUTOMATED COUNT: 12.6 % (ref 11.5–14.5)
GFR SERPL CREATININE-BSD FRML MDRD: 45 ML/MIN/1.73M2
GLUCOSE SERPL-MCNC: 133 MG/DL (ref 70–108)
HCT VFR BLD AUTO: 47.1 % (ref 42–52)
HGB BLD-MCNC: 15.9 GM/DL (ref 14–18)
IMM GRANULOCYTES # BLD AUTO: 0.07 THOU/MM3 (ref 0–0.07)
IMM GRANULOCYTES NFR BLD AUTO: 0.5 %
LYMPHOCYTES ABSOLUTE: 2.4 THOU/MM3 (ref 1–4.8)
LYMPHOCYTES NFR BLD AUTO: 17 %
MCH RBC QN AUTO: 30.9 PG (ref 26–33)
MCHC RBC AUTO-ENTMCNC: 33.8 GM/DL (ref 32.2–35.5)
MCV RBC AUTO: 91.6 FL (ref 80–94)
MONOCYTES ABSOLUTE: 1 THOU/MM3 (ref 0.4–1.3)
MONOCYTES NFR BLD AUTO: 7 %
NEUTROPHILS NFR BLD AUTO: 75.3 %
NRBC BLD AUTO-RTO: 0 /100 WBC
NT-PROBNP SERPL IA-MCNC: 2240 PG/ML (ref 0–124)
OSMOLALITY SERPL CALC.SUM OF ELEC: 282.1 MOSMOL/KG (ref 275–300)
PLATELET # BLD AUTO: 320 THOU/MM3 (ref 130–400)
PMV BLD AUTO: 10 FL (ref 9.4–12.4)
POTASSIUM SERPL-SCNC: 4.4 MEQ/L (ref 3.5–5.2)
PROT SERPL-MCNC: 7 G/DL (ref 6.1–8)
RBC # BLD AUTO: 5.14 MILL/MM3 (ref 4.7–6.1)
SEGMENTED NEUTROPHILS ABSOLUTE COUNT: 10.8 THOU/MM3 (ref 1.8–7.7)
SODIUM SERPL-SCNC: 135 MEQ/L (ref 135–145)
T4 FREE SERPL-MCNC: 1.58 NG/DL (ref 0.93–1.76)
TROPONIN T: < 0.01 NG/ML
TSH SERPL DL<=0.005 MIU/L-ACNC: 1.6 UIU/ML (ref 0.4–4.2)
WBC # BLD AUTO: 14.3 THOU/MM3 (ref 4.8–10.8)

## 2023-04-11 PROCEDURE — 71045 X-RAY EXAM CHEST 1 VIEW: CPT

## 2023-04-11 PROCEDURE — 83880 ASSAY OF NATRIURETIC PEPTIDE: CPT

## 2023-04-11 PROCEDURE — 2500000003 HC RX 250 WO HCPCS: Performed by: EMERGENCY MEDICINE

## 2023-04-11 PROCEDURE — 84484 ASSAY OF TROPONIN QUANT: CPT

## 2023-04-11 PROCEDURE — 85025 COMPLETE CBC W/AUTO DIFF WBC: CPT

## 2023-04-11 PROCEDURE — 93005 ELECTROCARDIOGRAM TRACING: CPT | Performed by: EMERGENCY MEDICINE

## 2023-04-11 PROCEDURE — 36415 COLL VENOUS BLD VENIPUNCTURE: CPT

## 2023-04-11 PROCEDURE — 96374 THER/PROPH/DIAG INJ IV PUSH: CPT

## 2023-04-11 PROCEDURE — 80053 COMPREHEN METABOLIC PANEL: CPT

## 2023-04-11 PROCEDURE — 6370000000 HC RX 637 (ALT 250 FOR IP)

## 2023-04-11 PROCEDURE — 83735 ASSAY OF MAGNESIUM: CPT

## 2023-04-11 PROCEDURE — 99285 EMERGENCY DEPT VISIT HI MDM: CPT

## 2023-04-11 PROCEDURE — 84439 ASSAY OF FREE THYROXINE: CPT

## 2023-04-11 PROCEDURE — 84100 ASSAY OF PHOSPHORUS: CPT

## 2023-04-11 PROCEDURE — 84443 ASSAY THYROID STIM HORMONE: CPT

## 2023-04-11 RX ORDER — METOPROLOL TARTRATE 5 MG/5ML
10 INJECTION INTRAVENOUS ONCE
Status: COMPLETED | OUTPATIENT
Start: 2023-04-11 | End: 2023-04-11

## 2023-04-11 RX ORDER — DILTIAZEM HYDROCHLORIDE 5 MG/ML
10 INJECTION INTRAVENOUS ONCE
Status: DISCONTINUED | OUTPATIENT
Start: 2023-04-11 | End: 2023-04-12 | Stop reason: RX

## 2023-04-11 RX ADMIN — METOPROLOL TARTRATE 10 MG: 5 INJECTION INTRAVENOUS at 23:03

## 2023-04-11 ASSESSMENT — PAIN - FUNCTIONAL ASSESSMENT
PAIN_FUNCTIONAL_ASSESSMENT: NONE - DENIES PAIN
PAIN_FUNCTIONAL_ASSESSMENT: NONE - DENIES PAIN

## 2023-04-12 PROBLEM — R17 ELEVATED BILIRUBIN: Status: ACTIVE | Noted: 2023-04-12

## 2023-04-12 PROBLEM — I48.91 ATRIAL FIBRILLATION (HCC): Status: ACTIVE | Noted: 2023-04-12

## 2023-04-12 PROBLEM — N17.9 AKI (ACUTE KIDNEY INJURY) (HCC): Status: ACTIVE | Noted: 2023-04-12

## 2023-04-12 PROBLEM — E87.20 METABOLIC ACIDOSIS: Status: ACTIVE | Noted: 2023-04-12

## 2023-04-12 PROBLEM — D72.829 LEUKOCYTOSIS: Status: ACTIVE | Noted: 2023-04-12

## 2023-04-12 PROBLEM — Z72.0 TOBACCO ABUSE: Status: ACTIVE | Noted: 2023-04-12

## 2023-04-12 PROBLEM — R11.2 NAUSEA AND VOMITING: Status: ACTIVE | Noted: 2023-04-12

## 2023-04-12 LAB
ANION GAP SERPL CALC-SCNC: 13 MEQ/L (ref 8–16)
BASOPHILS ABSOLUTE: 0 THOU/MM3 (ref 0–0.1)
BASOPHILS NFR BLD AUTO: 0.2 %
BUN SERPL-MCNC: 38 MG/DL (ref 7–22)
CALCIUM SERPL-MCNC: 9.1 MG/DL (ref 8.5–10.5)
CHLORIDE SERPL-SCNC: 104 MEQ/L (ref 98–111)
CO2 SERPL-SCNC: 24 MEQ/L (ref 23–33)
CREAT SERPL-MCNC: 1.5 MG/DL (ref 0.4–1.2)
DEPRECATED RDW RBC AUTO: 43.4 FL (ref 35–45)
EOSINOPHIL NFR BLD AUTO: 0.9 %
EOSINOPHILS ABSOLUTE: 0.1 THOU/MM3 (ref 0–0.4)
ERYTHROCYTE [DISTWIDTH] IN BLOOD BY AUTOMATED COUNT: 12.6 % (ref 11.5–14.5)
GFR SERPL CREATININE-BSD FRML MDRD: 49 ML/MIN/1.73M2
GLUCOSE SERPL-MCNC: 114 MG/DL (ref 70–108)
HCT VFR BLD AUTO: 47.1 % (ref 42–52)
HGB BLD-MCNC: 15.6 GM/DL (ref 14–18)
IMM GRANULOCYTES # BLD AUTO: 0.06 THOU/MM3 (ref 0–0.07)
IMM GRANULOCYTES NFR BLD AUTO: 0.5 %
LV EF: 50 %
LVEF MODALITY: NORMAL
LYMPHOCYTES ABSOLUTE: 3.8 THOU/MM3 (ref 1–4.8)
LYMPHOCYTES NFR BLD AUTO: 31.6 %
MAGNESIUM SERPL-MCNC: 1.8 MG/DL (ref 1.6–2.4)
MCH RBC QN AUTO: 31 PG (ref 26–33)
MCHC RBC AUTO-ENTMCNC: 33.1 GM/DL (ref 32.2–35.5)
MCV RBC AUTO: 93.5 FL (ref 80–94)
MONOCYTES ABSOLUTE: 1.2 THOU/MM3 (ref 0.4–1.3)
MONOCYTES NFR BLD AUTO: 10.1 %
NEUTROPHILS NFR BLD AUTO: 56.7 %
NRBC BLD AUTO-RTO: 0 /100 WBC
PHOSPHATE SERPL-MCNC: 4 MG/DL (ref 2.4–4.7)
PLATELET # BLD AUTO: 274 THOU/MM3 (ref 130–400)
PMV BLD AUTO: 9.9 FL (ref 9.4–12.4)
POTASSIUM SERPL-SCNC: 4.4 MEQ/L (ref 3.5–5.2)
RBC # BLD AUTO: 5.04 MILL/MM3 (ref 4.7–6.1)
SEGMENTED NEUTROPHILS ABSOLUTE COUNT: 6.7 THOU/MM3 (ref 1.8–7.7)
SODIUM SERPL-SCNC: 141 MEQ/L (ref 135–145)
TROPONIN T: < 0.01 NG/ML
WBC # BLD AUTO: 11.9 THOU/MM3 (ref 4.8–10.8)

## 2023-04-12 PROCEDURE — G0378 HOSPITAL OBSERVATION PER HR: HCPCS

## 2023-04-12 PROCEDURE — 93306 TTE W/DOPPLER COMPLETE: CPT

## 2023-04-12 PROCEDURE — 84484 ASSAY OF TROPONIN QUANT: CPT

## 2023-04-12 PROCEDURE — 6370000000 HC RX 637 (ALT 250 FOR IP)

## 2023-04-12 PROCEDURE — 99223 1ST HOSP IP/OBS HIGH 75: CPT | Performed by: FAMILY MEDICINE

## 2023-04-12 PROCEDURE — 93010 ELECTROCARDIOGRAM REPORT: CPT | Performed by: INTERNAL MEDICINE

## 2023-04-12 PROCEDURE — 36415 COLL VENOUS BLD VENIPUNCTURE: CPT

## 2023-04-12 PROCEDURE — 93005 ELECTROCARDIOGRAM TRACING: CPT | Performed by: STUDENT IN AN ORGANIZED HEALTH CARE EDUCATION/TRAINING PROGRAM

## 2023-04-12 PROCEDURE — 6370000000 HC RX 637 (ALT 250 FOR IP): Performed by: PSYCHIATRY & NEUROLOGY

## 2023-04-12 PROCEDURE — 2500000003 HC RX 250 WO HCPCS: Performed by: STUDENT IN AN ORGANIZED HEALTH CARE EDUCATION/TRAINING PROGRAM

## 2023-04-12 PROCEDURE — 96372 THER/PROPH/DIAG INJ SC/IM: CPT

## 2023-04-12 PROCEDURE — 6370000000 HC RX 637 (ALT 250 FOR IP): Performed by: STUDENT IN AN ORGANIZED HEALTH CARE EDUCATION/TRAINING PROGRAM

## 2023-04-12 PROCEDURE — 2580000003 HC RX 258: Performed by: PSYCHIATRY & NEUROLOGY

## 2023-04-12 PROCEDURE — 93005 ELECTROCARDIOGRAM TRACING: CPT

## 2023-04-12 PROCEDURE — 6360000002 HC RX W HCPCS: Performed by: PSYCHIATRY & NEUROLOGY

## 2023-04-12 PROCEDURE — 80048 BASIC METABOLIC PNL TOTAL CA: CPT

## 2023-04-12 PROCEDURE — 99223 1ST HOSP IP/OBS HIGH 75: CPT | Performed by: NUCLEAR MEDICINE

## 2023-04-12 PROCEDURE — 93005 ELECTROCARDIOGRAM TRACING: CPT | Performed by: FAMILY MEDICINE

## 2023-04-12 PROCEDURE — 85025 COMPLETE CBC W/AUTO DIFF WBC: CPT

## 2023-04-12 PROCEDURE — 96376 TX/PRO/DX INJ SAME DRUG ADON: CPT

## 2023-04-12 RX ORDER — ACETAMINOPHEN 650 MG/1
650 SUPPOSITORY RECTAL EVERY 6 HOURS PRN
Status: DISCONTINUED | OUTPATIENT
Start: 2023-04-12 | End: 2023-04-14 | Stop reason: HOSPADM

## 2023-04-12 RX ORDER — ONDANSETRON 4 MG/1
4 TABLET, ORALLY DISINTEGRATING ORAL EVERY 8 HOURS PRN
Status: DISCONTINUED | OUTPATIENT
Start: 2023-04-12 | End: 2023-04-14 | Stop reason: HOSPADM

## 2023-04-12 RX ORDER — SODIUM CHLORIDE 0.9 % (FLUSH) 0.9 %
5-40 SYRINGE (ML) INJECTION PRN
Status: DISCONTINUED | OUTPATIENT
Start: 2023-04-12 | End: 2023-04-14 | Stop reason: HOSPADM

## 2023-04-12 RX ORDER — ENOXAPARIN SODIUM 100 MG/ML
1 INJECTION SUBCUTANEOUS 2 TIMES DAILY
Status: DISCONTINUED | OUTPATIENT
Start: 2023-04-12 | End: 2023-04-14 | Stop reason: HOSPADM

## 2023-04-12 RX ORDER — METOPROLOL TARTRATE 5 MG/5ML
5 INJECTION INTRAVENOUS ONCE
Status: COMPLETED | OUTPATIENT
Start: 2023-04-12 | End: 2023-04-12

## 2023-04-12 RX ORDER — ONDANSETRON 2 MG/ML
4 INJECTION INTRAMUSCULAR; INTRAVENOUS EVERY 6 HOURS PRN
Status: DISCONTINUED | OUTPATIENT
Start: 2023-04-12 | End: 2023-04-14 | Stop reason: HOSPADM

## 2023-04-12 RX ORDER — DILTIAZEM HYDROCHLORIDE 60 MG/1
60 TABLET, FILM COATED ORAL EVERY 6 HOURS SCHEDULED
Status: DISCONTINUED | OUTPATIENT
Start: 2023-04-12 | End: 2023-04-12

## 2023-04-12 RX ORDER — SODIUM CHLORIDE 0.9 % (FLUSH) 0.9 %
5-40 SYRINGE (ML) INJECTION EVERY 12 HOURS SCHEDULED
Status: DISCONTINUED | OUTPATIENT
Start: 2023-04-12 | End: 2023-04-14 | Stop reason: HOSPADM

## 2023-04-12 RX ORDER — POLYETHYLENE GLYCOL 3350 17 G/17G
17 POWDER, FOR SOLUTION ORAL DAILY PRN
Status: DISCONTINUED | OUTPATIENT
Start: 2023-04-12 | End: 2023-04-14 | Stop reason: HOSPADM

## 2023-04-12 RX ORDER — SODIUM CHLORIDE 9 MG/ML
INJECTION, SOLUTION INTRAVENOUS PRN
Status: DISCONTINUED | OUTPATIENT
Start: 2023-04-12 | End: 2023-04-14 | Stop reason: HOSPADM

## 2023-04-12 RX ORDER — ASPIRIN 81 MG/1
81 TABLET ORAL DAILY
Status: DISCONTINUED | OUTPATIENT
Start: 2023-04-12 | End: 2023-04-14 | Stop reason: HOSPADM

## 2023-04-12 RX ORDER — SODIUM CHLORIDE, SODIUM LACTATE, POTASSIUM CHLORIDE, CALCIUM CHLORIDE 600; 310; 30; 20 MG/100ML; MG/100ML; MG/100ML; MG/100ML
INJECTION, SOLUTION INTRAVENOUS CONTINUOUS
Status: DISCONTINUED | OUTPATIENT
Start: 2023-04-12 | End: 2023-04-13

## 2023-04-12 RX ORDER — DILTIAZEM HYDROCHLORIDE 60 MG/1
60 TABLET, FILM COATED ORAL EVERY 6 HOURS
Status: DISCONTINUED | OUTPATIENT
Start: 2023-04-12 | End: 2023-04-14

## 2023-04-12 RX ORDER — ACETAMINOPHEN 160 MG
TABLET,DISINTEGRATING ORAL
COMMUNITY
End: 2023-04-21

## 2023-04-12 RX ORDER — ACETAMINOPHEN 325 MG/1
650 TABLET ORAL EVERY 6 HOURS PRN
Status: DISCONTINUED | OUTPATIENT
Start: 2023-04-12 | End: 2023-04-14 | Stop reason: HOSPADM

## 2023-04-12 RX ORDER — ATORVASTATIN CALCIUM 10 MG/1
10 TABLET, FILM COATED ORAL DAILY
Status: DISCONTINUED | OUTPATIENT
Start: 2023-04-12 | End: 2023-04-14 | Stop reason: HOSPADM

## 2023-04-12 RX ADMIN — SODIUM CHLORIDE, POTASSIUM CHLORIDE, SODIUM LACTATE AND CALCIUM CHLORIDE: 600; 310; 30; 20 INJECTION, SOLUTION INTRAVENOUS at 21:13

## 2023-04-12 RX ADMIN — ENOXAPARIN SODIUM 90 MG: 100 INJECTION SUBCUTANEOUS at 14:00

## 2023-04-12 RX ADMIN — DILTIAZEM HYDROCHLORIDE 60 MG: 60 TABLET, FILM COATED ORAL at 18:33

## 2023-04-12 RX ADMIN — DILTIAZEM HYDROCHLORIDE 60 MG: 60 TABLET, FILM COATED ORAL at 10:14

## 2023-04-12 RX ADMIN — DILTIAZEM HYDROCHLORIDE 60 MG: 60 TABLET, FILM COATED ORAL at 23:37

## 2023-04-12 RX ADMIN — METOPROLOL TARTRATE 25 MG: 25 TABLET, FILM COATED ORAL at 08:16

## 2023-04-12 RX ADMIN — SODIUM CHLORIDE, PRESERVATIVE FREE 10 ML: 5 INJECTION INTRAVENOUS at 08:22

## 2023-04-12 RX ADMIN — ENOXAPARIN SODIUM 90 MG: 100 INJECTION SUBCUTANEOUS at 01:56

## 2023-04-12 RX ADMIN — SODIUM CHLORIDE, POTASSIUM CHLORIDE, SODIUM LACTATE AND CALCIUM CHLORIDE: 600; 310; 30; 20 INJECTION, SOLUTION INTRAVENOUS at 01:55

## 2023-04-12 RX ADMIN — METOPROLOL TARTRATE 5 MG: 5 INJECTION INTRAVENOUS at 02:49

## 2023-04-12 RX ADMIN — METOPROLOL TARTRATE 25 MG: 25 TABLET, FILM COATED ORAL at 20:12

## 2023-04-12 RX ADMIN — DILTIAZEM HYDROCHLORIDE 60 MG: 60 TABLET, FILM COATED ORAL at 04:38

## 2023-04-12 RX ADMIN — SODIUM CHLORIDE, POTASSIUM CHLORIDE, SODIUM LACTATE AND CALCIUM CHLORIDE: 600; 310; 30; 20 INJECTION, SOLUTION INTRAVENOUS at 11:39

## 2023-04-12 RX ADMIN — ASPIRIN 81 MG: 81 TABLET, COATED ORAL at 08:16

## 2023-04-12 RX ADMIN — ATORVASTATIN CALCIUM 10 MG: 10 TABLET, FILM COATED ORAL at 20:12

## 2023-04-12 RX ADMIN — METOPROLOL TARTRATE 25 MG: 25 TABLET, FILM COATED ORAL at 14:03

## 2023-04-12 ASSESSMENT — PAIN - FUNCTIONAL ASSESSMENT: PAIN_FUNCTIONAL_ASSESSMENT: NONE - DENIES PAIN

## 2023-04-12 NOTE — PROGRESS NOTES
Pt admitted to  [phone] [id number] in a wheelchair and from ED. Complaints: None. IIV site free of s/s of infection or infiltration. Vital signs obtained. Assessment and data collection initiated. Two nurse skin assessment performed by this RN and Brett Farfan. Oriented to room. Policies and procedures for 3B explained. This RN discussed hourly rounding with patient addressing 5 P's. Fall prevention and safety brochure discussed with patient. Bed alarm on. Call light in reach. Explained patients right to have family, representative or physician notified of their admission. Patient has Declined for physician to be notified. Patient has Declined for family/representative to be notified. All questions answered with no further questions at this time.

## 2023-04-12 NOTE — PROGRESS NOTES
Alert and orientated x4, speech clear and appropriate. Pupils equal, round, and reactive to light. Upper extremities pink, warm, and dry. No numbness/tingling or pain present upon palpation. Hand grasp strong and equal bilaterally, arm drift negative, capillary refill less than 3 seconds. Lung sounds clear and equal bilaterally with moderate depth and rhythm. Active bowel sounds x4. No palpable masses upon palpation. No pain present upon palpation. Lower extremities pink, warm and dry. No numbness/tingling present upon palpation. Pedal push and pull strong and equal bilaterally. Pedal pulses strong and equal bilaterally. Skin intact over coccyx.     [facility]/SN

## 2023-04-12 NOTE — PROGRESS NOTES
No changes to previous assessment. Call light within reach.  No concerns voiced      Ana Reyes RSC/SN

## 2023-04-12 NOTE — PROGRESS NOTES
Pt in bed eating breakfast, wife at bedside. Primary nurse notified of 103/58 blood pressure. No concerns voiced, call light within reach.     [facility]/SN

## 2023-04-12 NOTE — CARE COORDINATION
Case Management Assessment  Initial Evaluation    Date/Time of Evaluation: 4/12/2023 1:35 PM  Assessment Completed by: Hannah Hdez RN    If patient is discharged prior to next notation, then this note serves as note for discharge by case management. Patient Name: Marie Crow                   YOB: 1950  Diagnosis: Atrial fibrillation ([facility] [facility] [I48.91]  Atrial fibrillation with RVR ([facility] [address] [I48.91]                   Date / Time: 4/11/2023 10:38 PM  Location: [address] [place] [address]     Patient Admission Status: Observation   Readmission Risk Low 0-14, Mod 15-19), High > 20: No data recorded  Current PCP: Serina Beauchamp MD  PCP verified by CM? Yes    Chart Reviewed: Yes      History Provided by: Patient  Patient Orientation: Alert and Oriented    Patient Cognition: Alert    Hospitalization in the last 30 days (Readmission):  Yes    If yes, Readmission Assessment in CM Navigator will be completed. Advance Directives:      Code Status: Full Code   Patient's Primary Decision Maker is: Legal Next of Kin (Spiritual Care consulted)    Primary Decision Maker: Willian Rowland Spouse - [phone]    Secondary Decision Maker: Hilda Rowland Child - [phone]    Discharge Planning:    Patient lives with: Spouse/Significant Other Type of Home: House  Primary Care Giver: Self  Patient Support Systems include: Spouse/Significant Other, Children, Family Members   Current Financial resources: Medicare, Other (Comment) (Secondary insurance)  Current community resources: None  Current services prior to admission: Durable Medical Equipment            Current DME: Other (Comment) (BP cuff)            Type of Home Care services:  None    ADLS  Prior functional level: Independent in ADLs/IADLs  Current functional level: Independent in ADLs/IADLs    Family can provide assistance at DC:  Yes  Would you like Case Management to discuss the discharge plan with any other family members/significant others, and if so, who?

## 2023-04-12 NOTE — ED PROVIDER NOTES
[facility] Juliennehugh Zhong  Phone: [address]       Chief Complaint   Patient presents with    Irregular Heart Beat       Nurses Notes reviewed and I agree except as noted in the HPI. HISTORY OF PRESENT ILLNESS    Katherine Connors is a 68 y.o. male. Patient presents with atrial fibrillation with rapid ventricular response. He feels general weakness and shortness of breath with exertion but does not feel with tachycardia. Patient thinks he has not had atrial fibrillation in the past.  He thinks he may have had some other type of dysrhythmia. There is no loss of consciousness today    REVIEW OF SYSTEMS         No chest pain or abdominal pain    Remainder of review of systems is otherwise reviewed as negative. PAST MEDICAL HISTORY    has a past medical history of Gout, Hyperlipidemia, Hypertension, and Prostate cancer ([facility] [address] SURGICAL HISTORY      has a past surgical history that includes Total hip arthroplasty (); Prostate surgery (2010); joint replacement; Tonsillectomy; Colonoscopy; and pr scrotal exploration (Left, 12/3/2020).     CURRENT MEDICATIONS       Current Discharge Medication List        CONTINUE these medications which have NOT CHANGED    Details   Cholecalciferol (VITAMIN D3) 50 MCG (2000 UT) CAPS Take by mouth Pt unsure of dosage      lisinopril-hydroCHLOROthiazide (PRINZIDE;ZESTORETIC) 20-25 MG per tablet TAKE 1.5 TABLETS BY MOUTH DAILY  Qty: 135 tablet, Refills: 0    Associated Diagnoses: Essential hypertension      sildenafil (VIAGRA) 100 MG tablet Take 1 tablet by mouth daily as needed for Erectile Dysfunction  Qty: 30 tablet, Refills: 5    Associated Diagnoses: Other male erectile dysfunction      simvastatin (ZOCOR) 10 MG tablet Take 1 tablet by mouth nightly  Qty: 90 tablet, Refills: 3    Associated Diagnoses: Mixed hypercholesterolemia and hypertriglyceridemia      aspirin EC 81 MG EC tablet Take 1 tablet by mouth

## 2023-04-12 NOTE — ED TRIAGE NOTES
Patient presents to ED with c/o irregular heartbeat. Patient states he has been feeling more sluggish recently and had a few episodes of emesis today. Wife states patient had an irregular heart rate at home and they could not get an accurate blood pressure.

## 2023-04-12 NOTE — FLOWSHEET NOTE
04/12/23 1154   Treatment Team Notification   Reason for Communication Review case   Team Member Name Dr. Pedro Delgadillo Team Role Resident   Method of Communication Secure Message   Response Waiting for response   Notification Time        I was looking for his EKG. ..did they hand it to you?

## 2023-04-12 NOTE — ED NOTES
ED to inpatient nurses report    Chief Complaint   Patient presents with    Irregular Heart Beat      Present to ED from home  LOC: alert and orientated to name, place, date  Vital signs   Vitals:    04/11/23 2243 04/11/23 2247 04/11/23 2324 04/12/23 0033   BP: 111/88  109/68 122/72   Pulse: 71 (!) 142 (!) 130 (!) 108   Resp: 16  16 16   Temp:  98.7 °F (37.1 °C)     TempSrc: Oral      SpO2: 95%  95% 95%   Weight: 195 lb (88.5 kg)      Height: 6' (1.829 m)         Oxygen Baseline roomair    Current needs required none Bipap/Cpap No  LDAs:    Mobility: Independent  Pending ED orders: none  Present condition: stable    C-SSRS Risk of Suicide: No Risk  Swallow Screening    Preferred Language: [place]     Electronically signed by Valerie Slaughter RN on 4/12/2023 at 1:23 AM      Valerie Slaughter RN  04/12/23 [id number]

## 2023-04-13 ENCOUNTER — APPOINTMENT (OUTPATIENT)
Dept: CARDIAC CATH/INVASIVE PROCEDURES | Age: 73
DRG: 309 | End: 2023-04-13
Payer: MEDICARE

## 2023-04-13 LAB
ALBUMIN SERPL BCG-MCNC: 4 G/DL (ref 3.5–5.1)
ALP SERPL-CCNC: 49 U/L (ref 38–126)
ALT SERPL W/O P-5'-P-CCNC: 22 U/L (ref 11–66)
ANION GAP SERPL CALC-SCNC: 11 MEQ/L (ref 8–16)
AST SERPL-CCNC: 25 U/L (ref 5–40)
BILIRUB CONJ SERPL-MCNC: < 0.2 MG/DL (ref 0–0.3)
BILIRUB SERPL-MCNC: 0.6 MG/DL (ref 0.3–1.2)
BUN SERPL-MCNC: 31 MG/DL (ref 7–22)
CALCIUM SERPL-MCNC: 9.3 MG/DL (ref 8.5–10.5)
CHLORIDE SERPL-SCNC: 105 MEQ/L (ref 98–111)
CO2 SERPL-SCNC: 25 MEQ/L (ref 23–33)
CREAT SERPL-MCNC: 1.1 MG/DL (ref 0.4–1.2)
DEPRECATED RDW RBC AUTO: 42.8 FL (ref 35–45)
ERYTHROCYTE [DISTWIDTH] IN BLOOD BY AUTOMATED COUNT: 12.5 % (ref 11.5–14.5)
GFR SERPL CREATININE-BSD FRML MDRD: > 60 ML/MIN/1.73M2
GLUCOSE SERPL-MCNC: 104 MG/DL (ref 70–108)
HCT VFR BLD AUTO: 42.1 % (ref 42–52)
HGB BLD-MCNC: 14.1 GM/DL (ref 14–18)
INR PPP: 1.06 (ref 0.85–1.13)
LV EF: 45 %
LVEF MODALITY: NORMAL
MAGNESIUM SERPL-MCNC: 1.8 MG/DL (ref 1.6–2.4)
MCH RBC QN AUTO: 31.1 PG (ref 26–33)
MCHC RBC AUTO-ENTMCNC: 33.5 GM/DL (ref 32.2–35.5)
MCV RBC AUTO: 92.7 FL (ref 80–94)
PHOSPHATE SERPL-MCNC: 3.1 MG/DL (ref 2.4–4.7)
PLATELET # BLD AUTO: 241 THOU/MM3 (ref 130–400)
PMV BLD AUTO: 10 FL (ref 9.4–12.4)
POTASSIUM SERPL-SCNC: 4.3 MEQ/L (ref 3.5–5.2)
PROT SERPL-MCNC: 6 G/DL (ref 6.1–8)
RBC # BLD AUTO: 4.54 MILL/MM3 (ref 4.7–6.1)
SODIUM SERPL-SCNC: 141 MEQ/L (ref 135–145)
WBC # BLD AUTO: 9 THOU/MM3 (ref 4.8–10.8)

## 2023-04-13 PROCEDURE — 2140000000 HC CCU INTERMEDIATE R&B

## 2023-04-13 PROCEDURE — 80053 COMPREHEN METABOLIC PANEL: CPT

## 2023-04-13 PROCEDURE — 6360000002 HC RX W HCPCS: Performed by: PSYCHIATRY & NEUROLOGY

## 2023-04-13 PROCEDURE — G0378 HOSPITAL OBSERVATION PER HR: HCPCS

## 2023-04-13 PROCEDURE — 85027 COMPLETE CBC AUTOMATED: CPT

## 2023-04-13 PROCEDURE — 6360000002 HC RX W HCPCS

## 2023-04-13 PROCEDURE — 6370000000 HC RX 637 (ALT 250 FOR IP)

## 2023-04-13 PROCEDURE — 85610 PROTHROMBIN TIME: CPT

## 2023-04-13 PROCEDURE — 5A2204Z RESTORATION OF CARDIAC RHYTHM, SINGLE: ICD-10-PCS | Performed by: NUCLEAR MEDICINE

## 2023-04-13 PROCEDURE — 83735 ASSAY OF MAGNESIUM: CPT

## 2023-04-13 PROCEDURE — 93320 DOPPLER ECHO COMPLETE: CPT

## 2023-04-13 PROCEDURE — 6370000000 HC RX 637 (ALT 250 FOR IP): Performed by: PSYCHIATRY & NEUROLOGY

## 2023-04-13 PROCEDURE — 93010 ELECTROCARDIOGRAM REPORT: CPT | Performed by: INTERNAL MEDICINE

## 2023-04-13 PROCEDURE — B24BZZ4 ULTRASONOGRAPHY OF HEART WITH AORTA, TRANSESOPHAGEAL: ICD-10-PCS | Performed by: NUCLEAR MEDICINE

## 2023-04-13 PROCEDURE — 96361 HYDRATE IV INFUSION ADD-ON: CPT

## 2023-04-13 PROCEDURE — 36415 COLL VENOUS BLD VENIPUNCTURE: CPT

## 2023-04-13 PROCEDURE — 2580000003 HC RX 258: Performed by: PSYCHIATRY & NEUROLOGY

## 2023-04-13 PROCEDURE — 92960 CARDIOVERSION ELECTRIC EXT: CPT

## 2023-04-13 PROCEDURE — 2500000003 HC RX 250 WO HCPCS

## 2023-04-13 PROCEDURE — 84100 ASSAY OF PHOSPHORUS: CPT

## 2023-04-13 PROCEDURE — 6370000000 HC RX 637 (ALT 250 FOR IP): Performed by: STUDENT IN AN ORGANIZED HEALTH CARE EDUCATION/TRAINING PROGRAM

## 2023-04-13 PROCEDURE — 96372 THER/PROPH/DIAG INJ SC/IM: CPT

## 2023-04-13 PROCEDURE — 93005 ELECTROCARDIOGRAM TRACING: CPT | Performed by: NUCLEAR MEDICINE

## 2023-04-13 PROCEDURE — 93312 ECHO TRANSESOPHAGEAL: CPT

## 2023-04-13 PROCEDURE — 99232 SBSQ HOSP IP/OBS MODERATE 35: CPT | Performed by: FAMILY MEDICINE

## 2023-04-13 PROCEDURE — 92960 CARDIOVERSION ELECTRIC EXT: CPT | Performed by: NUCLEAR MEDICINE

## 2023-04-13 PROCEDURE — 93325 DOPPLER ECHO COLOR FLOW MAPG: CPT

## 2023-04-13 PROCEDURE — 82248 BILIRUBIN DIRECT: CPT

## 2023-04-13 PROCEDURE — 99232 SBSQ HOSP IP/OBS MODERATE 35: CPT | Performed by: PHYSICIAN ASSISTANT

## 2023-04-13 RX ADMIN — METOPROLOL TARTRATE 25 MG: 25 TABLET, FILM COATED ORAL at 20:10

## 2023-04-13 RX ADMIN — ASPIRIN 81 MG: 81 TABLET, COATED ORAL at 08:25

## 2023-04-13 RX ADMIN — DILTIAZEM HYDROCHLORIDE 60 MG: 60 TABLET, FILM COATED ORAL at 18:46

## 2023-04-13 RX ADMIN — ENOXAPARIN SODIUM 90 MG: 100 INJECTION SUBCUTANEOUS at 04:02

## 2023-04-13 RX ADMIN — SODIUM CHLORIDE, PRESERVATIVE FREE 10 ML: 5 INJECTION INTRAVENOUS at 20:09

## 2023-04-13 RX ADMIN — ATORVASTATIN CALCIUM 10 MG: 10 TABLET, FILM COATED ORAL at 20:10

## 2023-04-13 RX ADMIN — METOPROLOL TARTRATE 25 MG: 25 TABLET, FILM COATED ORAL at 08:25

## 2023-04-13 RX ADMIN — SODIUM CHLORIDE, PRESERVATIVE FREE 10 ML: 5 INJECTION INTRAVENOUS at 08:25

## 2023-04-13 RX ADMIN — SODIUM CHLORIDE, POTASSIUM CHLORIDE, SODIUM LACTATE AND CALCIUM CHLORIDE: 600; 310; 30; 20 INJECTION, SOLUTION INTRAVENOUS at 06:54

## 2023-04-13 RX ADMIN — DILTIAZEM HYDROCHLORIDE 60 MG: 60 TABLET, FILM COATED ORAL at 10:46

## 2023-04-13 RX ADMIN — ENOXAPARIN SODIUM 90 MG: 100 INJECTION SUBCUTANEOUS at 16:14

## 2023-04-13 RX ADMIN — DILTIAZEM HYDROCHLORIDE 60 MG: 60 TABLET, FILM COATED ORAL at 22:50

## 2023-04-13 RX ADMIN — DILTIAZEM HYDROCHLORIDE 60 MG: 60 TABLET, FILM COATED ORAL at 04:02

## 2023-04-13 NOTE — ACP (ADVANCE CARE PLANNING)
Advance Care Planning     Advance Care Planning Inpatient Note  [facility] Department    Today's Date: 4/12/2023  Unit: [facility]B    Received request from IDT Member. Upon review of chart and communication with care team, patient's decision making abilities are not in question. . Patient was/were present in the room during visit. Goals of ACP Conversation:  Discuss advance care planning documents    Health Care Decision Makers:       Primary Decision Maker: Ovidio Lopez - Spouse - [phone]  Summary:  Documented Next of Kin, per patient report    Advance Care Planning Documents (Patient Wishes):  None     Assessment:  Patient is retired from working with juvenile offenders. He shared that he has seen a lot of bad situations, but also has seen how families can pull together in a crisis. He is feeling better today and has spent time reflecting on his parent's EOL situations and how they both took control in their final illnesses. He is motivated to complete HCPOA and have good discussions with family about his wishes. Interventions:  Provided education on documents for clarity and greater understanding  Discussed and provided education on state decision maker hierarchy  Encouraged ongoing ACP conversation with future decision makers and loved ones  Reviewed but did not complete ACP document    Care Preferences Communicated:   No  Reviewed code status choices. Patient is considering his options.      Outcomes/Plan:  ACP Discussion: Completed    Electronically signed by Theo Vega on 4/12/2023 at 11:06 PM

## 2023-04-13 NOTE — PLAN OF CARE
PLAN OF CARE      Patient:  Cristian Dongin  Unit/Bed:3B-32/032-A  YOB: 1950  MRN: [id number]   Acct: [de-identified]    PCP: Micky Piedra MD    Date of Admission: 4/11/2023 LOS: 0    Date of Evaluation:  4/12/2023    Anticipated Discharge: Pending clinical course    In brief, this is a 69 yo male with PMHx HTN, HLD, prostate cancer s/p external beam radiation completed 10/2010, PAF s/p MONIQUE cardioversion in 2020 who presented to [facility] ED with complaints of shortness of breath, dizziness, lightheadedness and overall feeling unwell since 4/8. He also notes associated decreased appetite. His daughter took his pulse and noted that his heart rate was irregular so recommended that he come to ED. He denies any chest pain but does note right sided mid axillary pain for the past few weeks. Agree with H&P, A/P from same day of service except as below:    Assessment/Plan:    Paroxysmal Atrial fib/flutter with RVR  Patient noted to have history of atrial fibrillation, was cardioverted for atrial fibrillation during hospital stay in October 2020 --> follows with Dr. Frandy Marie  Patient denies feeling similar symptoms since he had cardioversion in 2020  Initial EKG showed A-fib with RVR and RBBB x3, heart rate ranging , BP stable, troponin negative, BNP 2240  Repeat EKG 4/12 showed atrial flutter, repeat troponin negative  TSH/free T4 within normal limits  Gave Lopressor 25 mg every 6 hours x3 doses 4/12 => resume home Lopressor 25 twice daily tomorrow  On admission, started on Cardizem po 60 mg every 6 hours and Lovenox 90 mg twice daily  [id number] score of 3 continue therapeutic Lovenox --> has been on Eliquis prior, if MONIQUE cardioversion done, may resume   Unknown precipitating cause, possible 2/2 hypovolemia/dehydration as patient reports poor oral hydration --> started on maintenance IV fluids  Echo from 2020 showed EF 55% and no regional wall motion abnormalities => repeat echo ordered and
Problem: Discharge Planning  Goal: Discharge to home or other facility with appropriate resources  4/12/2023 1333 by Leighann Andrea RN  Outcome: Progressing  Flowsheets (Taken 4/12/2023 0423 by Sylvie Smith RN)  Discharge to home or other facility with appropriate resources: Identify barriers to discharge with patient and caregiver  Note: He is from home with his wife and plans on returning there at discharge. 4/12/2023 0423 by Sylvie Smith RN  Outcome: Progressing  Flowsheets (Taken 4/12/2023 0423)  Discharge to home or other facility with appropriate resources: Identify barriers to discharge with patient and caregiver     Problem: Safety - Adult  Goal: Free from fall injury  4/12/2023 1333 by Leighann Andrea RN  Outcome: Progressing  Flowsheets (Taken 4/12/2023 0423 by Sylvie Smith RN)  Free From Fall Injury: Instruct family/caregiver on patient safety  Note: Bed locked & in low position, call light in reach, side-rails up x2, bed/chair alarm utilized, non-slip socks on when ambulating, reminded patient to use call light to call for assistance. 4/12/2023 0423 by Sylvie Smith RN  Outcome: Progressing  Flowsheets (Taken 4/12/2023 0423)  Free From Fall Injury: Instruct family/caregiver on patient safety     Problem: Cardiovascular - Adult  Goal: Maintains optimal cardiac output and hemodynamic stability  4/12/2023 1333 by Leighann Andrea RN  Outcome: Progressing  Flowsheets (Taken 4/12/2023 0423 by Sylvie Smith RN)  Maintains optimal cardiac output and hemodynamic stability:   Monitor blood pressure and heart rate   Assess for signs of decreased cardiac output  Note: Ongoing assessment & interventions provided throughout shift. Patient on continuous telemetry monitoring, heart tones, vitals & pulses checked at least 3 times per shift & PRN. Vitals within acceptable limits. Peripheral pulses palpable.     4/12/2023 0423 by Sylvie Smith RN  Outcome:
Problem: Discharge Planning  Goal: Discharge to home or other facility with appropriate resources  4/13/2023 1007 by Valeriano Fernandez  Outcome: Progressing  Flowsheets (Taken 4/13/2023 1007)  Discharge to home or other facility with appropriate resources:   Identify barriers to discharge with patient and caregiver   Arrange for needed discharge resources and transportation as appropriate  Note: He is from home with his wife and plans on returning there at discharge. 4/13/2023 0446 by Cam Calvert RN  Outcome: Progressing  Flowsheets (Taken 4/13/2023 3569)  Discharge to home or other facility with appropriate resources: Identify barriers to discharge with patient and caregiver     Problem: Safety - Adult  Goal: Free from fall injury  4/13/2023 1007 by Brigida Castillo  Outcome: Progressing  Flowsheets (Taken 4/13/2023 1007)  Free From Fall Injury: Instruct family/caregiver on patient safety  Note: Bed locked & in low position, call light in reach, side-rails up x2, bed/chair alarm utilized, non-slip socks on when ambulating, reminded patient to use call light to call for assistance. 4/13/2023 0446 by Cam Calvert RN  Outcome: Progressing  Flowsheets (Taken 4/13/2023 0446)  Free From Fall Injury: Instruct family/caregiver on patient safety  Note: Bed locked & in low position, call light in reach, side-rails up x2, bed/chair alarm utilized, non-slip socks on when ambulating, reminded patient to use call light to call for assistance. Problem: Cardiovascular - Adult  Goal: Maintains optimal cardiac output and hemodynamic stability  4/13/2023 1007 by Valeriano Fernandez  Outcome: Progressing  Flowsheets (Taken 4/13/2023 1007)  Maintains optimal cardiac output and hemodynamic stability:   Monitor blood pressure and heart rate   Assess for signs of decreased cardiac output  Note: Ongoing assessment & interventions provided throughout shift.   Patient on continuous telemetry monitoring, heart tones, vitals & pulses
Problem: Discharge Planning  Goal: Discharge to home or other facility with appropriate resources  Outcome: Progressing  Flowsheets (Taken 4/12/2023 0423)  Discharge to home or other facility with appropriate resources: Identify barriers to discharge with patient and caregiver     Problem: Safety - Adult  Goal: Free from fall injury  Outcome: Progressing  Flowsheets (Taken 4/12/2023 0423)  Free From Fall Injury: Instruct family/caregiver on patient safety     Problem: Cardiovascular - Adult  Goal: Maintains optimal cardiac output and hemodynamic stability  Outcome: Progressing  Flowsheets (Taken 4/12/2023 0423)  Maintains optimal cardiac output and hemodynamic stability:   Monitor blood pressure and heart rate   Assess for signs of decreased cardiac output     Problem: Pain  Goal: Verbalizes/displays adequate comfort level or baseline comfort level  Outcome: Progressing  Flowsheets (Taken 4/12/2023 0423)  Verbalizes/displays adequate comfort level or baseline comfort level:   Encourage patient to monitor pain and request assistance   Assess pain using appropriate pain scale     Problem: Skin/Tissue Integrity  Goal: Absence of new skin breakdown  Description: 1. Monitor for areas of redness and/or skin breakdown  2. Assess vascular access sites hourly  3. Every 4-6 hours minimum:  Change oxygen saturation probe site  4. Every 4-6 hours:  If on nasal continuous positive airway pressure, respiratory therapy assess nares and determine need for appliance change or resting period. Outcome: Progressing    Care plan reviewed with patient. Patient verbalizes understanding of the care plan and contributed to goal setting.
shift.  Skin assessments provided. Encouraging/assisting patient to turn as needed. Problem: Chronic Conditions and Co-morbidities  Goal: Patient's chronic conditions and co-morbidity symptoms are monitored and maintained or improved  Outcome: Progressing  Flowsheets (Taken 4/13/2023 0446)  Care Plan - Patient's Chronic Conditions and Co-Morbidity Symptoms are Monitored and Maintained or Improved: Monitor and assess patient's chronic conditions and comorbid symptoms for stability, deterioration, or improvement     Problem: ABCDS Injury Assessment  Goal: Absence of physical injury  Outcome: Progressing  Flowsheets (Taken 4/13/2023 0446)  Absence of Physical Injury: Implement safety measures based on patient assessment     Care plan reviewed with patient. Patient verbalizes understanding of the care plan and contributed to goal setting.

## 2023-04-13 NOTE — DISCHARGE INSTRUCTIONS
F/up dr Myranda Anders 2-4 weeks  Cardiolyte exercise stress test 1 week    Restart lisinopril at discharge but only half tablet;  Check BP in morning and night, can take full tablet if SBP persistently >130   - follow up with PCP for further management and call PCP if SBP <100 or >160

## 2023-04-13 NOTE — H&P
Internal Medicine Resident History and Physical          Patient: Marie Crow  : 1950  MRN: [id number]     Acct: [de-identified]    PCP: Serina Beauchamp MD  Date of Admission: 2023  Date of Service: Pt seen/examined on 23  and Admitted to Inpatient with expected LOS greater than two midnights due to medical therapy. Hospital Problems             Last Modified POA    * (Principal) Atrial fibrillation ([address] 2023 Yes       Assessment and Plan:  Paroxysmal atrial fibrillation, currently rate controlled:   Unknown cause. Could be 2/2 fluid down. Has history of atrial fibrillation, successfully cardioverted in the past with amiodarone. Patient denied recent illness, TSH 1.6 with free T41.58.  proBNP 2240, troponin negative, magnesium 1.8, phosphorus 4.0. Sees Dr. Kathryn Toribio. Ordered Lovenox 1 mg/kg, Cardizem 60 mg 4 times daily due to diltiazem gtt. shortage. Continue aspirin daily. NEIDA  Likely prerenal due to patient history of low fluid intake for several days. BL creatinine 1.0-1.2. Creatinine 1.6 on admission. Trend daily CBC, continue with LR at 100 cc/h  Acute leucocytosis  Initial WBC 14.3. Could be 2/2 stress  Trend daily CBC  Primary hypertension:   Continue with Lopressor 25 mg twice daily  Hyperlipidemia  Continue with Lipitor 10 mg daily  Prostate cancer s/p external beam radiation  Noted per chart review          =======================================================================      Chief Complaint: Irregular heartbeat    History Of Present Illness:  Marie Crow is a 68 y.o. male with PMHx of HTN, HLD, prostate cancer s/p external beam radiation completed 10/2010 who presents to [facility] with irregular heartbeat. Patient came to the hospital when daughter took a manual BP and heard irregular heartbeat on auscultation. Patient states he felt dizzy and lightheaded, fatigued , shortness of breath since .   Patient also stated he had
DAILY 9/21/22   Ramos Murphy MD   valACYclovir (VALTREX) 500 MG tablet Take 1 tablet by mouth 2 times daily  Patient taking differently: Take 1 tablet by mouth 2 times daily as needed 3/30/22   Ondina Daniels MD   Glucosamine-Chondroit-Vit C-Mn (GLUCOSAMINE 1500 COMPLEX PO) Take 1 capsule by mouth daily     Historical Provider, MD   Probiotic Product (PROBIOTIC DAILY PO) Take 1 tablet by mouth daily     Historical Provider, MD   Ascorbic Acid (VITAMIN C) 500 MG tablet Take 1 tablet by mouth daily    Historical Provider, MD   fish oil-omega-3 fatty acids 1000 MG capsule Take 1 capsule by mouth daily    Historical Provider, MD   Coenzyme Q10 (CO Q 10) 100 MG CAPS Take 200 mg by mouth daily. Historical Provider, MD     Additional information:       VITAL SIGNS   Vitals:    04/13/23 1215   BP: 126/69   Pulse: 77   Resp: 16   Temp: 97.8 °F (36.6 °C)   SpO2: 95%       PHYSICAL:   General: No acute distress  HEENT:  Unremarkable for age  Neck: without increased JVD, carotid pulses 2+ bilaterally without bruits  Heart: RRR, S1 & S2 WNL, S4 gallop, without murmurs or rubs    Lungs: Clear to auscultation    Abdomen: BS present, without HSM, masses, or tenderness    Extremities: without C,C,E.  Pulses 2+ bilaterally  Mental Status: Alert & Oriented        PLANNED PROCEDURE   []Cath  []PCI                []Pacemaker/AICD  [x]MONIQUE             [x]Cardioversion []Peripheral angiography/PTA  []Other:      SEDATION  Planned agent:[x]Midazolam []Meperidine [x]Sublimaze []Morphine  []Diazepam  []Other:     ASA Classification:  []1 [x]2 []3 []4 []5  Class 1: A normal healthy patient  Class 2: Pt with mild to moderate systemic disease  Class 3: Severe systemic disease or disturbance  Class 4: Severe systemic disorders that are already life threatening. Class 5: Moribund pt with little chances of survival, for more than 24 hours.   Mallampati I Airway Classification:   []1 [x]2 []3 []4    [x]Pre-procedure diagnostic studies

## 2023-04-13 NOTE — PROGRESS NOTES
PROGRESS NOTE      Patient:  Altaf Gaytan  Unit/Bed:3B-32/032-A  YOB: 1950  MRN: [id number]   [place]: [de-identified]    PCP: Mary Canela MD    Date of Admission: 4/11/2023 LOS: 0    Date of Evaluation:  4/13/2023    Anticipated Discharge: tomorrow    Assessment/Plan:    Paroxysmal Atrial fib/flutter with RVR  Patient noted to have history of atrial fibrillation, was cardioverted for atrial fibrillation during hospital stay in October 2020 --> follows with Dr. Montanez Friend  Patient denies feeling similar symptoms since he had cardioversion in 2020  Initial EKG showed A-fib with RVR and RBBB x3, heart rate ranging , BP stable, troponin negative, BNP 2240  Repeat EKG 4/12 showed atrial flutter, repeat troponin negative  Unknown precipitating cause, possible 2/2 hypovolemia/dehydration as patient reports poor oral hydration --> started on maintenance IV fluids  TSH/free T4 within normal limits  Denies chest pain, troponin wnl, BNP elevated at 2240.0  Gave Lopressor 25 mg every 6 hours x3 doses 4/12 => resumed home Lopressor 25 mg BID 4/12  On admission, started on Cardizem po 60 mg every 6 hours and Lovenox 90 mg twice daily  [id number] score of 3 continue therapeutic Lovenox --> has been on Eliquis prior may resume after MONIQUE cardioversion  Echo from 2020 showed EF 55% and no regional wall motion abnormalities => repeat echo 4/12/23 EF 50% no RWMA, mild to moderate AS  Cardiology consulted, appreciate recs  Continue Cardizem and metoprolol --> MONIQUE cardioversion today  Leukocytosis (resolved)  WBCs 9.0 down from 14.3 on admission  Possible reactive 2/2 #1 vs GI infection as patient reports recent N/B, diarrhea  CXR on admission WNL, afebrile  Bilirubin elevated on initial labs, now wnl ,other LFTs WNL, continue to monitor  NEIDA (resolved)  Cre 1.6 on admission (baseline 0.9-1.2) --> 1.1 on 4/13 with IV maintenance fluids, will continue  Hold nephrotoxic medications including

## 2023-04-13 NOTE — PROCEDURES
[address] [place] [address]                            CARDIAC CATHETERIZATION    PATIENT NAME: Nandini Rodarte                    :        1950  MED REC NO:   [id number]                           ROOM:       [id number]  ACCOUNT NO:   [de-identified]                           ADMIT DATE: 2023  PROVIDER:     ARRON Covarrubias OF PROCEDURE:  2023    CARDIOVERSION    CLINICAL HISTORY AND INDICATION:  This is a gentleman with uncontrolled  atrial flutter, referred for MONIQUE-guided cardioversion. PROCEDURES PERFORMED:  1. MONIQUE-guided cardioversion. 2.  Sedation, 6 of Versed and 100 of fentanyl between 02:00 and 02:30  p.m. in my presence under my supervision. 3.  Reversal of sedation with 0.2 mg of Romazicon and 0.4 of Narcan. PROCEDURE IN DETAIL:  The patient was brought to cath lab. Hands-free  pads were applied to right upper chest and left upper back. The patient  received sedation as above. A MONIQUE was done to rule out intracardiac  thrombi. Subsequently, a shock was given from a biphasic defibrillator  at 50 joules converting him back to sinus rhythm without any  complication. The patient was awake, alert, and oriented. CONCLUSIONS:  1. Successful cardioversion with no complication. 2.  Continue with anticoagulation. RECOMMENDATION:  Follow as an outpatient.         Arielle Montelongo M.D.    D: 2023 15:50:28       T: 2023 16:40:52     ROGER/FAUSTINA_RUPERT  Job#: [id number]     Doc#: [id number]    CC:

## 2023-04-13 NOTE — PROGRESS NOTES
Cardiology Progress Note      Patient:  Mindi Nelson  YOB: 1950  MRN: [id number]   Acct: [de-identified]  [facility] Date:  4/11/2023  Primary Cardiologist:  dr Leena Ramos    Note per dr Louis Rear \"CHIEF COMPLAINT: Atrial flutter        HPI: This is a pleasant 68 y.o. male with a past medical history of atrial fibrillation, HTN, HLD, prostate cancer who presented to [facility] for irregular heart beat. Patient states he was at home and noticed for the last few days he was increasingly short of breath when walking. He then noticed the day of admission that he was feeling \"out of it\" and tried to take his BP but the machine kept giving an error message. He then had his daughter come over to take a manual BP but states his heart rate was irregular and he needed to go to the hospital prompting him to come in. At present he denies any chest pain, shortness of breath, left arm pain, numbness or tingling, jaw pain\"    Subjective (Events in last 24 hours): pt awake and alert. NAD. No cp or sob. No edema or orthopnea.   On RA  No complaints      Objective:   BP (!) 141/68   Pulse (!) 104   Temp 97.9 °F (36.6 °C) (Oral)   Resp 18   Ht 6' (1.829 m)   Wt 188 lb (85.3 kg)   SpO2 92%   BMI 25.50 kg/m²        TELEMETRY: aflutter low 100s    Physical Exam:  General Appearance: alert and oriented to person, place and time, in no acute distress  Cardiovascular: irregularly irregular  Pulmonary/Chest: clear to auscultation bilaterally- no wheezes, rales or rhonchi, normal air movement, no respiratory distress  Abdomen: soft, non-tender, non-distended, normal bowel sounds, no masses Extremities: no cyanosis, clubbing or edema, pulse   Skin: warm and dry, no rash or erythema  Head: normocephalic and atraumatic  Eyes: pupils equal, round, and reactive to light  Neck: supple and non-tender without mass, no thyromegaly   Neurological: alert, oriented, normal speech, no focal findings or movement disorder noted    Medications:    metoprolol

## 2023-04-14 ENCOUNTER — TELEPHONE (OUTPATIENT)
Dept: FAMILY MEDICINE CLINIC | Age: 73
End: 2023-04-14

## 2023-04-14 VITALS
RESPIRATION RATE: 18 BRPM | DIASTOLIC BLOOD PRESSURE: 71 MMHG | HEIGHT: 72 IN | TEMPERATURE: 97.6 F | BODY MASS INDEX: 25.47 KG/M2 | OXYGEN SATURATION: 97 % | WEIGHT: 188 LBS | SYSTOLIC BLOOD PRESSURE: 133 MMHG | HEART RATE: 68 BPM

## 2023-04-14 PROBLEM — I51.9 LV DYSFUNCTION: Status: ACTIVE | Noted: 2023-04-14

## 2023-04-14 LAB
ALBUMIN SERPL BCG-MCNC: 3.6 G/DL (ref 3.5–5.1)
ALP SERPL-CCNC: 49 U/L (ref 38–126)
ALT SERPL W/O P-5'-P-CCNC: 20 U/L (ref 11–66)
ANION GAP SERPL CALC-SCNC: 8 MEQ/L (ref 8–16)
AST SERPL-CCNC: 24 U/L (ref 5–40)
BILIRUB CONJ SERPL-MCNC: < 0.2 MG/DL (ref 0–0.3)
BILIRUB SERPL-MCNC: 0.9 MG/DL (ref 0.3–1.2)
BUN SERPL-MCNC: 22 MG/DL (ref 7–22)
CALCIUM SERPL-MCNC: 8.8 MG/DL (ref 8.5–10.5)
CHLORIDE SERPL-SCNC: 101 MEQ/L (ref 98–111)
CO2 SERPL-SCNC: 26 MEQ/L (ref 23–33)
CREAT SERPL-MCNC: 0.8 MG/DL (ref 0.4–1.2)
DEPRECATED RDW RBC AUTO: 43.5 FL (ref 35–45)
ERYTHROCYTE [DISTWIDTH] IN BLOOD BY AUTOMATED COUNT: 12.4 % (ref 11.5–14.5)
GFR SERPL CREATININE-BSD FRML MDRD: > 60 ML/MIN/1.73M2
GLUCOSE SERPL-MCNC: 100 MG/DL (ref 70–108)
HCT VFR BLD AUTO: 39.7 % (ref 42–52)
HGB BLD-MCNC: 12.8 GM/DL (ref 14–18)
MAGNESIUM SERPL-MCNC: 1.8 MG/DL (ref 1.6–2.4)
MCH RBC QN AUTO: 30.7 PG (ref 26–33)
MCHC RBC AUTO-ENTMCNC: 32.2 GM/DL (ref 32.2–35.5)
MCV RBC AUTO: 95.2 FL (ref 80–94)
PHOSPHATE SERPL-MCNC: 3 MG/DL (ref 2.4–4.7)
PLATELET # BLD AUTO: 218 THOU/MM3 (ref 130–400)
PMV BLD AUTO: 10 FL (ref 9.4–12.4)
POTASSIUM SERPL-SCNC: 4.1 MEQ/L (ref 3.5–5.2)
PROT SERPL-MCNC: 6 G/DL (ref 6.1–8)
RBC # BLD AUTO: 4.17 MILL/MM3 (ref 4.7–6.1)
SODIUM SERPL-SCNC: 135 MEQ/L (ref 135–145)
WBC # BLD AUTO: 9.3 THOU/MM3 (ref 4.8–10.8)

## 2023-04-14 PROCEDURE — 82248 BILIRUBIN DIRECT: CPT

## 2023-04-14 PROCEDURE — 6360000002 HC RX W HCPCS: Performed by: PSYCHIATRY & NEUROLOGY

## 2023-04-14 PROCEDURE — 6370000000 HC RX 637 (ALT 250 FOR IP)

## 2023-04-14 PROCEDURE — 84100 ASSAY OF PHOSPHORUS: CPT

## 2023-04-14 PROCEDURE — 80053 COMPREHEN METABOLIC PANEL: CPT

## 2023-04-14 PROCEDURE — 83735 ASSAY OF MAGNESIUM: CPT

## 2023-04-14 PROCEDURE — 99239 HOSP IP/OBS DSCHRG MGMT >30: CPT | Performed by: FAMILY MEDICINE

## 2023-04-14 PROCEDURE — 2580000003 HC RX 258: Performed by: PSYCHIATRY & NEUROLOGY

## 2023-04-14 PROCEDURE — 36415 COLL VENOUS BLD VENIPUNCTURE: CPT

## 2023-04-14 PROCEDURE — 99232 SBSQ HOSP IP/OBS MODERATE 35: CPT | Performed by: PHYSICIAN ASSISTANT

## 2023-04-14 PROCEDURE — 6370000000 HC RX 637 (ALT 250 FOR IP): Performed by: STUDENT IN AN ORGANIZED HEALTH CARE EDUCATION/TRAINING PROGRAM

## 2023-04-14 PROCEDURE — 6370000000 HC RX 637 (ALT 250 FOR IP): Performed by: PHYSICIAN ASSISTANT

## 2023-04-14 PROCEDURE — 6370000000 HC RX 637 (ALT 250 FOR IP): Performed by: PSYCHIATRY & NEUROLOGY

## 2023-04-14 PROCEDURE — 85027 COMPLETE CBC AUTOMATED: CPT

## 2023-04-14 RX ORDER — DILTIAZEM HYDROCHLORIDE 240 MG/1
240 CAPSULE, COATED, EXTENDED RELEASE ORAL DAILY
Status: DISCONTINUED | OUTPATIENT
Start: 2023-04-14 | End: 2023-04-14 | Stop reason: HOSPADM

## 2023-04-14 RX ORDER — DILTIAZEM HYDROCHLORIDE 240 MG/1
240 CAPSULE, COATED, EXTENDED RELEASE ORAL DAILY
Qty: 30 CAPSULE | Refills: 3 | Status: SHIPPED | OUTPATIENT
Start: 2023-04-14

## 2023-04-14 RX ORDER — LISINOPRIL AND HYDROCHLOROTHIAZIDE 25; 20 MG/1; MG/1
0.5 TABLET ORAL DAILY
Qty: 15 TABLET | Refills: 0
Start: 2023-04-14 | End: 2023-05-14

## 2023-04-14 RX ADMIN — SODIUM CHLORIDE, PRESERVATIVE FREE 10 ML: 5 INJECTION INTRAVENOUS at 08:45

## 2023-04-14 RX ADMIN — ENOXAPARIN SODIUM 90 MG: 100 INJECTION SUBCUTANEOUS at 03:30

## 2023-04-14 RX ADMIN — ASPIRIN 81 MG: 81 TABLET, COATED ORAL at 08:45

## 2023-04-14 RX ADMIN — DILTIAZEM HYDROCHLORIDE 60 MG: 60 TABLET, FILM COATED ORAL at 05:36

## 2023-04-14 RX ADMIN — METOPROLOL TARTRATE 25 MG: 25 TABLET, FILM COATED ORAL at 08:45

## 2023-04-14 RX ADMIN — DILTIAZEM HYDROCHLORIDE 240 MG: 240 CAPSULE, EXTENDED RELEASE ORAL at 12:33

## 2023-04-14 NOTE — CARE COORDINATION
4/14/23, 11:44 AM EDT    Patient goals/plan/ treatment preferences discussed by  and . Patient goals/plan/ treatment preferences reviewed with patient/ family. Patient/ family verbalize understanding of discharge plan and are in agreement with goal/plan/treatment preferences. Understanding was demonstrated using the teach back method. AVS provided by RN at time of discharge, which includes all necessary medical information pertaining to the patients current course of illness, treatment, post-discharge goals of care, and treatment preferences. Services At/After Discharge: None       IMM Letter  IMM Letter given to Patient/Family/Significant other/Guardian/POA/by[de-identified] Andrew Murray RN CM  IMM Letter date given[de-identified] 04/13/23  IMM Letter time given[de-identified] 8973  Observation Status Letter date given[de-identified] 04/12/23  Observation Status Letter time given[de-identified] 1910  Observation Status Letter given to Patient/Family/Significant other/Guardian/POA/by[de-identified] Pt Access        Planning home today with wife. Denies needs.

## 2023-04-14 NOTE — PROGRESS NOTES
Cardiology Progress Note      Patient:  Manuel Espinoza  YOB: 1950  MRN: [id number]   Acct: [de-identified]  [facility] Date:  4/11/2023  Primary Cardiologist:  dr Sid Donohue    Note per dr Melissa Gomez \"CHIEF COMPLAINT: Atrial flutter        HPI: This is a pleasant 68 y.o. male with a past medical history of atrial fibrillation, HTN, HLD, prostate cancer who presented to [facility] for irregular heart beat. Patient states he was at home and noticed for the last few days he was increasingly short of breath when walking. He then noticed the day of admission that he was feeling \"out of it\" and tried to take his BP but the machine kept giving an error message. He then had his daughter come over to take a manual BP but states his heart rate was irregular and he needed to go to the hospital prompting him to come in. At present he denies any chest pain, shortness of breath, left arm pain, numbness or tingling, jaw pain\"    Subjective (Events in last 24 hours):   Pt awake and alert. NAD> no cp or sob. No edema or orthopnea  No complaints.   Feels great  On RA  No issues with ambulation  Symptoms of dyspnea and lightheadedness upon admission resolved    Objective:   /73   Pulse 83   Temp 97.7 °F (36.5 °C) (Oral)   Resp 18   Ht 6' (1.829 m)   Wt 188 lb (85.3 kg)   SpO2 94%   BMI 25.50 kg/m²        TELEMETRY: nsr 80    Physical Exam:  General Appearance: alert and oriented to person, place and time, in no acute distress  Cardiovascular: regularly rate and rhythm, no murmurs or extra heart sounds  Pulmonary/Chest: clear to auscultation bilaterally- no wheezes, rales or rhonchi, normal air movement, no respiratory distress  Abdomen: soft, non-tender, non-distended, normal bowel sounds, no masses Extremities: no cyanosis, clubbing or edema, pulse   Skin: warm and dry, no rash or erythema  Head: normocephalic and atraumatic  Eyes: pupils equal, round, and reactive to light  Neck: supple and non-tender without mass, no thyromegaly

## 2023-04-14 NOTE — DISCHARGE SUMMARY
the morning. 4/13/23: Does still feel generally unwell. Occasional dizziness. Successful MONIQUE cardioversion by Dr Pito Moseley. Subjective/HPI:   Ellie Coronado feels stable overall. He denies HA, SOB, CP, palpitations, N/V/D. Exam:     Vitals:  Vitals:    04/13/23 2248 04/14/23 0336 04/14/23 0536 04/14/23 0730   BP: 131/75 129/69 (!) 145/81 139/73   Pulse: 74 82 82 83   Resp: 20 18 18   Temp: 97.9 °F (36.6 °C)   97.7 °F (36.5 °C)   TempSrc:    Oral   SpO2: 94% 92%  94%   Weight:       Height:         Weight: Weight: 188 lb (85.3 kg)     24 hour intake/output:  Intake/Output Summary (Last 24 hours) at 4/14/2023 1130  Last data filed at 4/13/2023 1713  Gross per 24 hour   Intake 2007.11 ml   Output --   Net 2007.11 ml         General appearance:  No apparent distress, appears stated age and cooperative. HEENT:  Normal cephalic, atraumatic without obvious deformity. Pupils equal, round, and reactive to light. Extra ocular muscles intact. Conjunctivae/corneas clear. Neck: Supple, with full range of motion. No jugular venous distention. Trachea midline. Respiratory:  Normal respiratory effort. Clear to auscultation, bilaterally without Rales/Wheezes/Rhonchi. Cardiovascular:  Regular rate and rhythm with normal S1/S2 without murmurs, rubs or gallops. Abdomen: Soft, non-tender, non-distended with normal bowel sounds. Musculoskeletal:  No clubbing, cyanosis or edema bilaterally. Full range of motion without deformity. Skin: Skin color, texture, turgor normal.  No rashes or lesions. Neurologic:  Neurovascularly intact without any focal sensory/motor deficits. Cranial nerves: II-XII intact, grossly non-focal.  Psychiatric:  Alert and oriented, thought content appropriate, normal insight  Capillary Refill: Brisk,< 3 seconds   Peripheral Pulses: +2 palpable, equal bilaterally       Labs:  For convenience and continuity at follow-up the following most recent labs are provided:      CBC:    Lab Results

## 2023-04-17 ENCOUNTER — CARE COORDINATION (OUTPATIENT)
Dept: CASE MANAGEMENT | Age: 73
End: 2023-04-17

## 2023-04-17 DIAGNOSIS — I48.91 ATRIAL FIBRILLATION WITH RVR (HCC): Primary | ICD-10-CM

## 2023-04-17 PROCEDURE — 1111F DSCHRG MED/CURRENT MED MERGE: CPT | Performed by: FAMILY MEDICINE

## 2023-04-17 NOTE — TELEPHONE ENCOUNTER
[facility] Transitions Initial Follow Up Call    Outreach made within 2 business days of discharge: Yes    Patient: Daisy Mukherjee Patient : 1950   MRN: [id number]  Reason for Admission: There are no discharge diagnoses documented for the most recent discharge. Discharge Date: 23       Spoke with: Liasndra Colvin    Discharge department/facility: [facility]    TCM Interactive Patient Contact:  Was patient able to fill all prescriptions: Yes  Was patient instructed to bring all medications to the follow-up visit: Yes  Is patient taking all medications as directed in the discharge summary?  Yes  Does patient understand their discharge instructions: Yes  Does patient have questions or concerns that need addressed prior to 7-14 day follow up office visit: no    Scheduled appointment with PCP within 7-14 days    Follow Up  Future Appointments   Date Time Provider Cam Bonds   2023  9:20 MD ASMITA GomezX [facility] [place]   5/3/2023  8:20 AM MD KIMMIE Gray [facility] [place]   5/3/2023 10:30 AM MD DONNIE Schmitt [facility]   8/15/2023 10:00 AM MD DONNIE Schmitt [facility] Heart [facility] - Tori Dennis LPN

## 2023-04-17 NOTE — CARE COORDINATION
[address] Care Transitions Initial Follow Up Call    Call within 2 business days of discharge: Yes    Patient Current Location:  Home: [address] [address]    Care Transition Nurse contacted the patient by telephone to perform post hospital discharge assessment. Verified name and  with patient as identifiers. Provided introduction to self, and explanation of the Care Transition Nurse role. Patient: Benedicto Merrill Patient : 1950   MRN: [id number]  Reason for Admission: Afib with RVR  Discharge Date: 23 RARS: Readmission Risk Score: 8      Last Discharge  Street       Date Complaint Diagnosis Description Type Department Provider    23 Irregular Heart Beat Atrial fibrillation with RVR ([address] . .. ED to Hosp-Admission (Discharged) (ADMITTED) Darya Braga MD; Holly Villagomez DO. .. Challenges to be reviewed by the provider   Additional needs identified to be addressed with provider: No  none               Method of communication with provider: none. Spoke with Swathi Gladys, said he feels \"really good. \"  Worked out in the yard yesterday until the rain. Denies chest pain, rapid HR. Reports he is having problem with his toe, gout flare up. His BP has been 130-140/70-80, HR 80-90. Was able to get a few doses of new medication for the weekend and will  more today after 3. Reviewed medications/changes. Thought he was to take 1.5 tabs of lisinopril/HCTZ. Reviewed AVS and is to take 1/2 tab unless his SBP is consistently above 130. Referred him to the last page of AVS with the instructions. Said he would adjust the dose. He will continue to monitor BP and will see PCP on Friday. He needs to schedule a stress test but wants to wait until his toe is better. Appetite and fluid intake is good. B&B normal.  Ebony Silverton he wanted to thank all the staff on 3[address], everyone took great care of him. Denies any other needs. No other questions or concerns at this time.   Will

## 2023-04-21 ENCOUNTER — OFFICE VISIT (OUTPATIENT)
Dept: FAMILY MEDICINE CLINIC | Age: 73
End: 2023-04-21

## 2023-04-21 VITALS
HEART RATE: 83 BPM | HEIGHT: 72 IN | WEIGHT: 184 LBS | BODY MASS INDEX: 24.92 KG/M2 | OXYGEN SATURATION: 95 % | SYSTOLIC BLOOD PRESSURE: 118 MMHG | TEMPERATURE: 96.8 F | DIASTOLIC BLOOD PRESSURE: 72 MMHG

## 2023-04-21 DIAGNOSIS — Z09 HOSPITAL DISCHARGE FOLLOW-UP: Primary | ICD-10-CM

## 2023-04-21 DIAGNOSIS — E86.0 DEHYDRATION: ICD-10-CM

## 2023-04-21 DIAGNOSIS — I48.0 PAROXYSMAL A-FIB (HCC): ICD-10-CM

## 2023-04-21 DIAGNOSIS — B00.1 COLD SORE: ICD-10-CM

## 2023-04-21 DIAGNOSIS — B00.9 HERPES SIMPLEX: ICD-10-CM

## 2023-04-21 DIAGNOSIS — R19.7 NAUSEA VOMITING AND DIARRHEA: ICD-10-CM

## 2023-04-21 DIAGNOSIS — M10.00 ACUTE IDIOPATHIC GOUT, UNSPECIFIED SITE: ICD-10-CM

## 2023-04-21 DIAGNOSIS — R11.2 NAUSEA VOMITING AND DIARRHEA: ICD-10-CM

## 2023-04-21 DIAGNOSIS — F51.01 PRIMARY INSOMNIA: ICD-10-CM

## 2023-04-21 RX ORDER — LORAZEPAM 0.5 MG/1
0.5 TABLET ORAL NIGHTLY PRN
Qty: 30 TABLET | Refills: 0 | Status: SHIPPED | OUTPATIENT
Start: 2023-04-21 | End: 2023-05-21

## 2023-04-21 RX ORDER — VALACYCLOVIR HYDROCHLORIDE 1 G/1
1000 TABLET, FILM COATED ORAL 2 TIMES DAILY
Qty: 20 TABLET | Refills: 0 | Status: SHIPPED | OUTPATIENT
Start: 2023-04-21 | End: 2023-05-01

## 2023-04-21 RX ORDER — INDOMETHACIN 50 MG/1
50 CAPSULE ORAL 3 TIMES DAILY PRN
Qty: 60 CAPSULE | Refills: 3 | Status: SHIPPED | OUTPATIENT
Start: 2023-04-21

## 2023-04-21 RX ORDER — DOXEPIN HYDROCHLORIDE 25 MG/1
25 CAPSULE ORAL NIGHTLY PRN
Qty: 30 CAPSULE | Refills: 2 | Status: SHIPPED | OUTPATIENT
Start: 2023-04-21 | End: 2023-07-20

## 2023-04-21 SDOH — ECONOMIC STABILITY: FOOD INSECURITY: WITHIN THE PAST 12 MONTHS, THE FOOD YOU BOUGHT JUST DIDN'T LAST AND YOU DIDN'T HAVE MONEY TO GET MORE.: NEVER TRUE

## 2023-04-21 SDOH — ECONOMIC STABILITY: INCOME INSECURITY: HOW HARD IS IT FOR YOU TO PAY FOR THE VERY BASICS LIKE FOOD, HOUSING, MEDICAL CARE, AND HEATING?: NOT HARD AT ALL

## 2023-04-21 SDOH — ECONOMIC STABILITY: FOOD INSECURITY: WITHIN THE PAST 12 MONTHS, YOU WORRIED THAT YOUR FOOD WOULD RUN OUT BEFORE YOU GOT MONEY TO BUY MORE.: NEVER TRUE

## 2023-04-21 SDOH — ECONOMIC STABILITY: HOUSING INSECURITY
IN THE LAST 12 MONTHS, WAS THERE A TIME WHEN YOU DID NOT HAVE A STEADY PLACE TO SLEEP OR SLEPT IN A SHELTER (INCLUDING NOW)?: NO

## 2023-04-21 ASSESSMENT — PATIENT HEALTH QUESTIONNAIRE - PHQ9
2. FEELING DOWN, DEPRESSED OR HOPELESS: 0
SUM OF ALL RESPONSES TO PHQ9 QUESTIONS 1 & 2: 0
SUM OF ALL RESPONSES TO PHQ QUESTIONS 1-9: 0
1. LITTLE INTEREST OR PLEASURE IN DOING THINGS: 0

## 2023-04-23 LAB
EKG ATRIAL RATE: 300 BPM
EKG ATRIAL RATE: 315 BPM
EKG ATRIAL RATE: 77 BPM
EKG P AXIS: 30 DEGREES
EKG P AXIS: 48 DEGREES
EKG P AXIS: 84 DEGREES
EKG P-R INTERVAL: 168 MS
EKG P-R INTERVAL: 168 MS
EKG P-R INTERVAL: 174 MS
EKG Q-T INTERVAL: 302 MS
EKG Q-T INTERVAL: 330 MS
EKG Q-T INTERVAL: 338 MS
EKG Q-T INTERVAL: 378 MS
EKG Q-T INTERVAL: 386 MS
EKG Q-T INTERVAL: 416 MS
EKG QRS DURATION: 106 MS
EKG QRS DURATION: 112 MS
EKG QRS DURATION: 118 MS
EKG QRS DURATION: 118 MS
EKG QRS DURATION: 124 MS
EKG QRS DURATION: 94 MS
EKG QTC CALCULATION (BAZETT): 440 MS
EKG QTC CALCULATION (BAZETT): 467 MS
EKG QTC CALCULATION (BAZETT): 470 MS
EKG QTC CALCULATION (BAZETT): 509 MS
EKG QTC CALCULATION (BAZETT): 512 MS
EKG QTC CALCULATION (BAZETT): 521 MS
EKG R AXIS: -43 DEGREES
EKG R AXIS: -58 DEGREES
EKG R AXIS: -61 DEGREES
EKG R AXIS: -71 DEGREES
EKG R AXIS: -75 DEGREES
EKG R AXIS: -76 DEGREES
EKG T AXIS: 51 DEGREES
EKG T AXIS: 53 DEGREES
EKG T AXIS: 64 DEGREES
EKG T AXIS: 71 DEGREES
EKG T AXIS: 72 DEGREES
EKG T AXIS: 83 DEGREES
EKG VENTRICULAR RATE: 106 BPM
EKG VENTRICULAR RATE: 128 BPM
EKG VENTRICULAR RATE: 143 BPM
EKG VENTRICULAR RATE: 143 BPM
EKG VENTRICULAR RATE: 77 BPM
EKG VENTRICULAR RATE: 92 BPM

## 2023-04-25 ENCOUNTER — CARE COORDINATION (OUTPATIENT)
Dept: CASE MANAGEMENT | Age: 73
End: 2023-04-25

## 2023-04-25 NOTE — CARE COORDINATION
Care Transitions Outreach Attempt    Attempted to reach patient for transitions of care follow up. Unable to reach patient. Patient: Ozzy Jacob Patient : 1950 MRN: [id number]    Last Discharge  Street       Date Complaint Diagnosis Description Type Department Provider    23 Irregular Heart Beat Atrial fibrillation with RVR ([facility] . .. ED to Hosp-Admission (Discharged) (ADMITTED) Fidel Tinoco MD; Mary Lal DO. .. Was this an external facility discharge?  No Discharge Facility: NA    Noted following upcoming appointments from discharge chart review:   [facility] follow up appointment(s):   Future Appointments   Date Time Provider Cam Bonds   5/3/2023 10:30 AM MD DONNIE Weston SR[facility] Heart [facility] [place] [address]   2023  9:40 AM Cayden Tracy MD SRPX [facility] [place]   8/15/2023 10:00 AM MD DONNIE Weston SRPX Heart [facility] [place] [address]     Non-[facility] follow up appointment(s): NA

## 2023-04-28 ENCOUNTER — CARE COORDINATION (OUTPATIENT)
Dept: CASE MANAGEMENT | Age: 73
End: 2023-04-28

## 2023-04-28 NOTE — CARE COORDINATION
[address] Care Transitions Follow Up Call    Patient Current Location:  Home: [address]    LPN Care Coordinator contacted the patient by telephone to follow up after admission on -. Verified name and  with patient as identifiers. Patient: Carmelita Lung  Patient : 1950   MRN: [id number]  Reason for Admission: afib, s/p MONIQUE cardioversion  Discharge Date: 23 RARS: Readmission Risk Score: 8      Needs to be reviewed by the provider   Additional needs identified to be addressed with provider: No  none             Method of communication with provider: none. CHELSEA CC spoke with patient. States he is good. Has resumed exercise regimen, energy improved. Monitors BP and P regularly. Denies CP, palpitations, dizziness, SOB. Improving diet, hydrating well. Denies problems with bowels or bladder. Gout flare up cleared up. Denies medication changes. Patient has a good understanding of his disease and appears to be making some important lifestyle modifications. Denies needs. Valerie Mahoney LPN CC  Care Transitions  [phone]    Addressed changes since last contact:  none  Discussed follow-up appointments. If no appointment was previously scheduled, appointment scheduling offered: Yes. Is follow up appointment scheduled within 7 days of discharge? Yes. Follow Up  Future Appointments   Date Time Provider Cam Bonds   5/3/2023 10:30 AM MD DONNIE [place] [facility]   2023  9:40 AM Lazarus Osgood, MD [facility]   8/15/2023 10:00 AM MD DONNIE [place] [facility]     Non-[facility] follow up appointment(s): NA    LPN Care Coordinator reviewed medical action plan and red flags with patient and discussed any barriers to care and/or understanding of plan of care after discharge.  Discussed appropriate site of care based on symptoms and resources available to patient including: PCP  Specialist  Urgent care clinics  When to call

## 2023-05-03 ENCOUNTER — OFFICE VISIT (OUTPATIENT)
Dept: CARDIOLOGY CLINIC | Age: 73
End: 2023-05-03
Payer: MEDICARE

## 2023-05-03 VITALS
BODY MASS INDEX: 25.9 KG/M2 | HEART RATE: 82 BPM | WEIGHT: 191.2 LBS | SYSTOLIC BLOOD PRESSURE: 121 MMHG | DIASTOLIC BLOOD PRESSURE: 68 MMHG | HEIGHT: 72 IN

## 2023-05-03 DIAGNOSIS — I48.0 PAF (PAROXYSMAL ATRIAL FIBRILLATION) (HCC): Primary | ICD-10-CM

## 2023-05-03 PROCEDURE — 1123F ACP DISCUSS/DSCN MKR DOCD: CPT | Performed by: INTERNAL MEDICINE

## 2023-05-03 PROCEDURE — 99214 OFFICE O/P EST MOD 30 MIN: CPT | Performed by: INTERNAL MEDICINE

## 2023-05-03 PROCEDURE — G8427 DOCREV CUR MEDS BY ELIG CLIN: HCPCS | Performed by: INTERNAL MEDICINE

## 2023-05-03 PROCEDURE — 1036F TOBACCO NON-USER: CPT | Performed by: INTERNAL MEDICINE

## 2023-05-03 PROCEDURE — G8417 CALC BMI ABV UP PARAM F/U: HCPCS | Performed by: INTERNAL MEDICINE

## 2023-05-03 PROCEDURE — 3074F SYST BP LT 130 MM HG: CPT | Performed by: INTERNAL MEDICINE

## 2023-05-03 PROCEDURE — 3078F DIAST BP <80 MM HG: CPT | Performed by: INTERNAL MEDICINE

## 2023-05-03 PROCEDURE — 3017F COLORECTAL CA SCREEN DOC REV: CPT | Performed by: INTERNAL MEDICINE

## 2023-05-03 PROCEDURE — 1111F DSCHRG MED/CURRENT MED MERGE: CPT | Performed by: INTERNAL MEDICINE

## 2023-05-03 NOTE — PROGRESS NOTES
Negative for arthralgias, joint swelling and neck pain. Neurological: Negative for numbness and headaches. Psychiatric/Behavioral: Negative for agitation, confusion, decreased concentration and dysphoric mood. Objective:     Vitals:    05/03/23 1048   BP: 121/68   Pulse: 82        Wt Readings from Last 3 Encounters:   05/03/23 191 lb 3.2 oz (86.7 kg)   04/21/23 184 lb (83.5 kg)   04/13/23 188 lb (85.3 kg)     BP Readings from Last 3 Encounters:   05/03/23 121/68   04/21/23 118/72   04/14/23 133/71       Nursing note and vitals reviewed. Physical Exam   Constitutional: Oriented to person, place, and time. Appears well-developed and well-nourished. ENT: Moist mucous membranes. No bleeding. Tongue is midline. Head: Normocephalic and atraumatic. Eyes: EOM are normal. Pupils are equal, round, and reactive to light. Neck: Normal range of motion. Neck supple. No JVD present. Cardiovascular: Normal rate, regular rhythm, no murmur, no rubs, and intact distal pulses. Pulmonary/Chest: Effort normal and breath sounds normal. No respiratory distress. No wheezes. No rales. Abdominal: Soft. Bowel sounds are normal. No distension. There is no tenderness. Musculoskeletal: Normal range of motion. no edema. Neurological: Alert and oriented to person, place, and time. No cranial nerve deficit. Coordination normal.   Skin: Skin is warm and dry. Psychiatric: Normal mood and affect.        No results found for: CKTOTAL, CKMB, CKMBINDEX    Lab Results   Component Value Date/Time    WBC 9.3 04/14/2023 03:30 AM    RBC 4.17 04/14/2023 03:30 AM    HGB 12.8 04/14/2023 03:30 AM    HCT 39.7 04/14/2023 03:30 AM    MCV 95.2 04/14/2023 03:30 AM    MCH 30.7 04/14/2023 03:30 AM    MCHC 32.2 04/14/2023 03:30 AM    RDW 13.3 12/02/2020 09:10 AM     04/14/2023 03:30 AM    MPV 10.0 04/14/2023 03:30 AM       Lab Results   Component Value Date/Time     04/14/2023 03:30 AM    K 4.1 04/14/2023 03:30 AM

## 2023-05-04 ENCOUNTER — CARE COORDINATION (OUTPATIENT)
Dept: CASE MANAGEMENT | Age: 73
End: 2023-05-04

## 2023-05-04 NOTE — CARE COORDINATION
[facility] Care Transitions Follow Up Call    Patient Current Location:  Home: [address]    Care Transition Nurse contacted the patient by telephone to follow up after admission on 23. Verified name and  with patient as identifiers. Patient: Elina Baker  Patient : 1950   MRN: [id number]  Reason for Admission: Afib with RVR  Discharge Date: 23 RARS: Readmission Risk Score: 8      Needs to be reviewed by the provider   Additional needs identified to be addressed with provider: No  none             Method of communication with provider: none. Spoke with Dhiraj Rosado, said he feels great. Getting his strength and energy back. Exercising now. BP, HR stable. Denies chest pain, palpitations, dizziness, SOB/LI. Eating, drinking fluids, sleeping well. Saw cardio yesterday but forgot to ask about stress test but doesn't want to do it until he is back from vacation. Will be gone last 2 wks of May and will call him when he is back home. Denies any other needs. No other questions or concerns at this time. Doesn't feel it necessary to continue calling, appreciates the calls and has my number if needs arise. Addressed changes since last contact:  none  Discussed follow-up appointments. Follow Up  Future Appointments   Date Time Provider Cam Bonds   2023  9:40 AM MD KIMMIE Solorzano [facility] [address]   2024 10:45 AM Prasad Jurado MD N KIMMIE Heart [facility] [address] [address]     Non-[facility] follow up appointment(s): diamante    Care Transition Nurse reviewed medical action plan and red flags with patient and discussed any barriers to care and/or understanding of plan of care after discharge. Discussed appropriate site of care based on symptoms and resources available to patient including: PCP  Specialist  Urgent care clinics  When to call [address] The patient agrees to contact the PCP office for questions related to their healthcare.      [facility]

## 2023-05-12 ENCOUNTER — TELEPHONE (OUTPATIENT)
Dept: FAMILY MEDICINE CLINIC | Age: 73
End: 2023-05-12

## 2023-05-12 NOTE — TELEPHONE ENCOUNTER
Called office in regards to paper from [employer] about patients Lorazepam that he dropped off yesterday. Informed from what the paper says he will need to contact his insurance and see exactly what they need. Voiced understanding and will contact insurance and call office back.  We will keep paper in the mean time

## 2023-06-06 ENCOUNTER — PATIENT MESSAGE (OUTPATIENT)
Dept: FAMILY MEDICINE CLINIC | Age: 73
End: 2023-06-06

## 2023-07-08 DIAGNOSIS — I10 ESSENTIAL HYPERTENSION: ICD-10-CM

## 2023-07-10 RX ORDER — LISINOPRIL AND HYDROCHLOROTHIAZIDE 25; 20 MG/1; MG/1
TABLET ORAL
Qty: 135 TABLET | OUTPATIENT
Start: 2023-07-10

## 2023-07-19 DIAGNOSIS — F51.01 PRIMARY INSOMNIA: ICD-10-CM

## 2023-07-19 RX ORDER — DOXEPIN HYDROCHLORIDE 25 MG/1
CAPSULE ORAL
Qty: 90 CAPSULE | Refills: 1 | Status: SHIPPED | OUTPATIENT
Start: 2023-07-19

## 2023-07-19 NOTE — TELEPHONE ENCOUNTER
Josh Raphael called requesting a refill on the following medications:  Requested Prescriptions     Pending Prescriptions Disp Refills    doxepin (SINEQUAN) 25 MG capsule [Pharmacy Med Name: DOXEPIN 25 MG CAPSULE] 30 capsule 2     Sig: take 1 capsule by mouth at bedtime if needed for insomnia       Date of last visit: 4/21/2023  Date of next visit (if applicable):10/16/2023  Date of last refill: 4/21/2023  Pharmacy Name: [facility]  [place], [place]

## 2023-10-16 ENCOUNTER — PATIENT MESSAGE (OUTPATIENT)
Dept: FAMILY MEDICINE CLINIC | Age: 73
End: 2023-10-16

## 2023-10-16 ENCOUNTER — OFFICE VISIT (OUTPATIENT)
Dept: FAMILY MEDICINE CLINIC | Age: 73
End: 2023-10-16

## 2023-10-16 VITALS
HEART RATE: 84 BPM | OXYGEN SATURATION: 95 % | DIASTOLIC BLOOD PRESSURE: 84 MMHG | TEMPERATURE: 98.1 F | SYSTOLIC BLOOD PRESSURE: 132 MMHG | HEIGHT: 72 IN | WEIGHT: 190 LBS | BODY MASS INDEX: 25.73 KG/M2 | RESPIRATION RATE: 16 BRPM

## 2023-10-16 DIAGNOSIS — R10.12 LUQ PAIN: ICD-10-CM

## 2023-10-16 DIAGNOSIS — I48.0 PAROXYSMAL A-FIB (HCC): ICD-10-CM

## 2023-10-16 DIAGNOSIS — F41.9 ANXIETY: ICD-10-CM

## 2023-10-16 DIAGNOSIS — I10 ESSENTIAL HYPERTENSION: ICD-10-CM

## 2023-10-16 DIAGNOSIS — E11.9 TYPE 2 DIABETES MELLITUS WITHOUT COMPLICATION, WITHOUT LONG-TERM CURRENT USE OF INSULIN (HCC): ICD-10-CM

## 2023-10-16 DIAGNOSIS — Z12.5 SCREENING FOR PROSTATE CANCER: ICD-10-CM

## 2023-10-16 DIAGNOSIS — B00.1 COLD SORE: ICD-10-CM

## 2023-10-16 DIAGNOSIS — Z00.00 MEDICARE ANNUAL WELLNESS VISIT, SUBSEQUENT: Primary | ICD-10-CM

## 2023-10-16 DIAGNOSIS — E78.2 MIXED HYPERCHOLESTEROLEMIA AND HYPERTRIGLYCERIDEMIA: ICD-10-CM

## 2023-10-16 DIAGNOSIS — Z12.11 SCREEN FOR COLON CANCER: ICD-10-CM

## 2023-10-16 PROBLEM — I48.91 ATRIAL FIBRILLATION (HCC): Status: RESOLVED | Noted: 2023-04-12 | Resolved: 2023-10-16

## 2023-10-16 PROBLEM — N17.9 AKI (ACUTE KIDNEY INJURY) (HCC): Status: RESOLVED | Noted: 2023-04-12 | Resolved: 2023-10-16

## 2023-10-16 LAB — HBA1C MFR BLD: 6.2 %

## 2023-10-16 RX ORDER — VALACYCLOVIR HYDROCHLORIDE 1 G/1
1000 TABLET, FILM COATED ORAL 2 TIMES DAILY
COMMUNITY
End: 2023-10-16 | Stop reason: SDUPTHER

## 2023-10-16 RX ORDER — LORAZEPAM 0.5 MG/1
0.5 TABLET ORAL EVERY 6 HOURS PRN
COMMUNITY
End: 2023-10-16 | Stop reason: SDUPTHER

## 2023-10-16 RX ORDER — SIMVASTATIN 10 MG
10 TABLET ORAL NIGHTLY
Qty: 90 TABLET | Refills: 1 | Status: SHIPPED | OUTPATIENT
Start: 2023-10-16

## 2023-10-16 RX ORDER — DILTIAZEM HYDROCHLORIDE 240 MG/1
240 CAPSULE, COATED, EXTENDED RELEASE ORAL DAILY
Qty: 90 CAPSULE | Refills: 1 | Status: SHIPPED | OUTPATIENT
Start: 2023-10-16

## 2023-10-16 RX ORDER — LORAZEPAM 0.5 MG/1
0.5 TABLET ORAL EVERY 6 HOURS PRN
Qty: 30 TABLET | Refills: 0 | Status: SHIPPED | OUTPATIENT
Start: 2023-10-16 | End: 2024-01-14

## 2023-10-16 RX ORDER — VALACYCLOVIR HYDROCHLORIDE 1 G/1
1000 TABLET, FILM COATED ORAL 2 TIMES DAILY
Qty: 60 TABLET | Refills: 3 | Status: SHIPPED | OUTPATIENT
Start: 2023-10-16

## 2023-10-16 ASSESSMENT — PATIENT HEALTH QUESTIONNAIRE - PHQ9
SUM OF ALL RESPONSES TO PHQ QUESTIONS 1-9: 0
1. LITTLE INTEREST OR PLEASURE IN DOING THINGS: 0
SUM OF ALL RESPONSES TO PHQ QUESTIONS 1-9: 0
SUM OF ALL RESPONSES TO PHQ9 QUESTIONS 1 & 2: 0
2. FEELING DOWN, DEPRESSED OR HOPELESS: 0

## 2023-10-16 ASSESSMENT — LIFESTYLE VARIABLES
HOW MANY STANDARD DRINKS CONTAINING ALCOHOL DO YOU HAVE ON A TYPICAL DAY: 1 OR 2
HOW OFTEN DO YOU HAVE A DRINK CONTAINING ALCOHOL: 2-4 TIMES A MONTH

## 2023-10-16 NOTE — PROGRESS NOTES
Team:  Caitlyn Shipman MD as PCP - General (Family Medicine)  Caitlyn Shipman MD as PCP - Empaneled Provider  Lola Brush MD as Cardiologist (Cardiology)     Reviewed and updated this visit:  Tobacco  Allergies  Meds  Problems  Med Hx  Surg Hx  Soc Hx  Fam Hx

## 2023-10-16 NOTE — PATIENT INSTRUCTIONS
carrots, peaches, and berries. Foods high in fiber can reduce your cholesterol and provide important vitamins and minerals. High-fiber foods include whole-grain cereals and breads, oatmeal, beans, brown rice, citrus fruits, and apples. Eat lean proteins. Heart-healthy proteins include seafood, lean meats and poultry, eggs, beans, peas, nuts, seeds, and soy products. Limit drinks and foods with added sugar. These include candy, desserts, and soda pop. Lifestyle changes    If your doctor recommends it, get more exercise. Walking is a good choice. Bit by bit, increase the amount you walk every day. Try for at least 30 minutes on most days of the week. You also may want to swim, bike, or do other activities. Do not smoke. If you need help quitting, talk to your doctor about stop-smoking programs and medicines. These can increase your chances of quitting for good. Quitting smoking may be the most important step you can take to protect your heart. It is never too late to quit. Limit alcohol to 2 drinks a day for men and 1 drink a day for women. Too much alcohol can cause health problems. Manage other health problems such as diabetes, high blood pressure, and high cholesterol. If you think you may have a problem with alcohol or drug use, talk to your doctor. Medicines    Take your medicines exactly as prescribed. Call your doctor if you think you are having a problem with your medicine. If your doctor recommends aspirin, take the amount directed each day. Make sure you take aspirin and not another kind of pain reliever, such as acetaminophen (Tylenol). When should you call for help? Call 911 if you have symptoms of a heart attack. These may include:    Chest pain or pressure, or a strange feeling in the chest.     Sweating. Shortness of breath. Pain, pressure, or a strange feeling in the back, neck, jaw, or upper belly or in one or both shoulders or arms.      Lightheadedness or

## 2024-01-22 DIAGNOSIS — F51.01 PRIMARY INSOMNIA: ICD-10-CM

## 2024-01-22 RX ORDER — DOXEPIN HYDROCHLORIDE 25 MG/1
CAPSULE ORAL
Qty: 90 CAPSULE | Refills: 1 | Status: SHIPPED | OUTPATIENT
Start: 2024-01-22

## 2024-01-22 NOTE — TELEPHONE ENCOUNTER
Jaron Hernandez called requesting a refill on the following medications:  Requested Prescriptions     Pending Prescriptions Disp Refills    doxepin (SINEQUAN) 25 MG capsule [Pharmacy Med Name: DOXEPIN 25 MG CAPSULE] 90 capsule 1     Sig: take 1 capsule by mouth at bedtime if needed for insomnia       Date of last visit: 10/16/2023  Date of next visit (if applicable):4/17/2024  Date of last refill: 7/19/23  Pharm[facility] Name: [facility]    Rx pended      Thanks,  Mae Box LPN

## 2024-01-31 NOTE — PROGRESS NOTES
Heparin Consult  Lab Results   Component Value Date    APTT 93.1 12/05/2020     Lab Results   Component Value Date    HGB 11.6 12/05/2020    HCT 35.3 12/05/2020     12/05/2020       Current Rate: 25 units/kg/hr    Plan:  Rate: Continue current rate at 25 units/kg/hr  Next aPTT: 13:00 on 12/5/2020    Candace Crain PharmD, BCPS  12/5/2020  9:08 AM Pt dropped off medication forms to be filled out.  He will pick them up when completed.  Forms have been placed in DKW's  mailbox in MA room.  Please adv pt when complete.  Thank you.

## 2024-04-02 DIAGNOSIS — E78.2 MIXED HYPERCHOLESTEROLEMIA AND HYPERTRIGLYCERIDEMIA: ICD-10-CM

## 2024-04-02 RX ORDER — SIMVASTATIN 10 MG
10 TABLET ORAL NIGHTLY
Qty: 90 TABLET | Refills: 1 | Status: SHIPPED | OUTPATIENT
Start: 2024-04-02

## 2024-04-02 NOTE — TELEPHONE ENCOUNTER
Jaron Hernandez called requesting a refill on the following medications:  Requested Prescriptions     Pending Prescriptions Disp Refills    simvastatin (ZOCOR) 10 MG tablet [Pharmacy Med Name: SIMVASTATIN 10 MG TABLET] 90 tablet 1     Sig: take 1 tablet by mouth every evening       Date of last visit: 10/16/2023  Date of next visit (if applicable):4/17/2024  Date of last refill: 10/16/23  Pharmacy Name: [facility],  Treva Iriwn MA

## 2024-04-10 ENCOUNTER — HOSPITAL ENCOUNTER (OUTPATIENT)
Age: 74
Discharge: HOME | End: 2024-04-10
Payer: MEDICARE

## 2024-04-10 DIAGNOSIS — E78.2 MIXED HYPERCHOLESTEROLEMIA AND HYPERTRIGLYCERIDEMIA: ICD-10-CM

## 2024-04-10 DIAGNOSIS — Z00.00 MEDICARE ANNUAL WELLNESS VISIT, SUBSEQUENT: ICD-10-CM

## 2024-04-10 DIAGNOSIS — I10 ESSENTIAL HYPERTENSION: ICD-10-CM

## 2024-04-10 DIAGNOSIS — E11.9 TYPE 2 DIABETES MELLITUS WITHOUT COMPLICATION, WITHOUT LONG-TERM CURRENT USE OF INSULIN (HCC): ICD-10-CM

## 2024-04-10 DIAGNOSIS — Z12.5 SCREENING FOR PROSTATE CANCER: ICD-10-CM

## 2024-04-10 DIAGNOSIS — I48.0 PAROXYSMAL A-FIB (HCC): ICD-10-CM

## 2024-04-10 DIAGNOSIS — F41.9 ANXIETY: ICD-10-CM

## 2024-04-10 LAB
ALBUMIN SERPL BCG-MCNC: 4.7 G/DL (ref 3.5–5.1)
ALP SERPL-CCNC: 54 U/L (ref 38–126)
ALT SERPL W/O P-5'-P-CCNC: 29 U/L (ref 11–66)
ANION GAP SERPL CALC-SCNC: 14 MEQ/L (ref 8–16)
AST SERPL-CCNC: 26 U/L (ref 5–40)
BASOPHILS ABSOLUTE: 0 THOU/MM3 (ref 0–0.1)
BASOPHILS NFR BLD AUTO: 0.3 %
BILIRUB SERPL-MCNC: 0.7 MG/DL (ref 0.3–1.2)
BUN SERPL-MCNC: 29 MG/DL (ref 7–22)
CALCIUM SERPL-MCNC: 10.1 MG/DL (ref 8.5–10.5)
CHLORIDE SERPL-SCNC: 95 MEQ/L (ref 98–111)
CHOLEST SERPL-MCNC: 197 MG/DL (ref 100–199)
CO2 SERPL-SCNC: 26 MEQ/L (ref 23–33)
CREAT SERPL-MCNC: 1.2 MG/DL (ref 0.4–1.2)
CREAT UR-MCNC: 99.1 MG/DL
DEPRECATED RDW RBC AUTO: 43.1 FL (ref 35–45)
EOSINOPHIL NFR BLD AUTO: 0.8 %
EOSINOPHILS ABSOLUTE: 0.1 THOU/MM3 (ref 0–0.4)
ERYTHROCYTE [DISTWIDTH] IN BLOOD BY AUTOMATED COUNT: 12.3 % (ref 11.5–14.5)
GFR SERPL CREATININE-BSD FRML MDRD: 64 ML/MIN/1.73M2
GLUCOSE SERPL-MCNC: 111 MG/DL (ref 70–108)
HCT VFR BLD AUTO: 45.4 % (ref 42–52)
HDLC SERPL-MCNC: 53 MG/DL
HGB BLD-MCNC: 15.1 GM/DL (ref 14–18)
IMM GRANULOCYTES # BLD AUTO: 0.02 THOU/MM3 (ref 0–0.07)
IMM GRANULOCYTES NFR BLD AUTO: 0.3 %
LDLC SERPL CALC-MCNC: 90 MG/DL
LYMPHOCYTES ABSOLUTE: 2.1 THOU/MM3 (ref 1–4.8)
LYMPHOCYTES NFR BLD AUTO: 25.9 %
MAGNESIUM SERPL-MCNC: 1.9 MG/DL (ref 1.6–2.4)
MCH RBC QN AUTO: 31.6 PG (ref 26–33)
MCHC RBC AUTO-ENTMCNC: 33.3 GM/DL (ref 32.2–35.5)
MCV RBC AUTO: 95 FL (ref 80–94)
MICROALBUMIN UR-MCNC: 6.79 MG/DL
MICROALBUMIN/CREAT RATIO PNL UR: 69 MG/G (ref 0–30)
MONOCYTES ABSOLUTE: 0.8 THOU/MM3 (ref 0.4–1.3)
MONOCYTES NFR BLD AUTO: 9.4 %
NEUTROPHILS NFR BLD AUTO: 63.3 %
NRBC BLD AUTO-RTO: 0 /100 WBC
PLATELET # BLD AUTO: 281 THOU/MM3 (ref 130–400)
PMV BLD AUTO: 10.5 FL (ref 9.4–12.4)
POTASSIUM SERPL-SCNC: 4.3 MEQ/L (ref 3.5–5.2)
PROT SERPL-MCNC: 7.3 G/DL (ref 6.1–8)
RBC # BLD AUTO: 4.78 MILL/MM3 (ref 4.7–6.1)
SEGMENTED NEUTROPHILS ABSOLUTE COUNT: 5.1 THOU/MM3 (ref 1.8–7.7)
SODIUM SERPL-SCNC: 135 MEQ/L (ref 135–145)
TRIGL SERPL-MCNC: 269 MG/DL (ref 0–199)
WBC # BLD AUTO: 8 THOU/MM3 (ref 4.8–10.8)

## 2024-04-10 PROCEDURE — 82607 VITAMIN B-12: CPT

## 2024-04-10 PROCEDURE — 80053 COMPREHEN METABOLIC PANEL: CPT

## 2024-04-10 PROCEDURE — G0103 PSA SCREENING: HCPCS

## 2024-04-10 PROCEDURE — 36415 COLL VENOUS BLD VENIPUNCTURE: CPT

## 2024-04-10 PROCEDURE — 82043 UR ALBUMIN QUANTITATIVE: CPT

## 2024-04-10 PROCEDURE — 84443 ASSAY THYROID STIM HORMONE: CPT

## 2024-04-10 PROCEDURE — 83036 HEMOGLOBIN GLYCOSYLATED A1C: CPT

## 2024-04-10 PROCEDURE — 85025 COMPLETE CBC W/AUTO DIFF WBC: CPT

## 2024-04-10 PROCEDURE — 83735 ASSAY OF MAGNESIUM: CPT

## 2024-04-10 PROCEDURE — 80061 LIPID PANEL: CPT

## 2024-04-11 LAB
DEPRECATED MEAN GLUCOSE BLD GHB EST-ACNC: 138 MG/DL (ref 70–126)
HBA1C MFR BLD HPLC: 6.6 % (ref 4.4–6.4)
PSA SERPL-MCNC: < 0.02 NG/ML (ref 0–1)
TSH SERPL DL<=0.005 MIU/L-ACNC: 1.07 UIU/ML (ref 0.4–4.2)
VIT B12 SERPL-MCNC: 625 PG/ML (ref 211–911)

## 2024-04-15 DIAGNOSIS — I48.0 PAROXYSMAL A-FIB (HCC): ICD-10-CM

## 2024-04-15 RX ORDER — RIVAROXABAN 20 MG/1
20 TABLET, FILM COATED ORAL
Qty: 90 TABLET | Refills: 0 | Status: SHIPPED | OUTPATIENT
Start: 2024-04-15 | End: 2024-04-17 | Stop reason: SDUPTHER

## 2024-04-15 NOTE — TELEPHONE ENCOUNTER
Jaron Hernandez called requesting a refill on the following medications:  Requested Prescriptions     Pending Prescriptions Disp Refills    XARELTO 20 MG TABS tablet [Pharmacy Med Name: XARELTO 20 MG TABLET] 90 tablet 1     Sig: take 1 tablet by mouth once daily WITH BREAKFAST.       Date of last visit: 10/16/2023  Date of next visit (if applicable):4/17/2024  Date of last refill: 10/16/23  Pharmacy Name: [facility],  Daniela Proctor LPN

## 2024-04-17 ENCOUNTER — HOSPITAL ENCOUNTER (OUTPATIENT)
Age: 74
Discharge: HOME | End: 2024-04-17
Payer: MEDICARE

## 2024-04-17 ENCOUNTER — OFFICE VISIT (OUTPATIENT)
Dept: FAMILY MEDICINE CLINIC | Age: 74
End: 2024-04-17

## 2024-04-17 VITALS
BODY MASS INDEX: 25.6 KG/M2 | OXYGEN SATURATION: 96 % | RESPIRATION RATE: 16 BRPM | SYSTOLIC BLOOD PRESSURE: 130 MMHG | HEIGHT: 72 IN | WEIGHT: 189 LBS | HEART RATE: 78 BPM | TEMPERATURE: 97 F | DIASTOLIC BLOOD PRESSURE: 76 MMHG

## 2024-04-17 DIAGNOSIS — R79.89 ELEVATED SERUM CREATININE: ICD-10-CM

## 2024-04-17 DIAGNOSIS — E78.5 HYPERLIPIDEMIA, UNSPECIFIED HYPERLIPIDEMIA TYPE: ICD-10-CM

## 2024-04-17 DIAGNOSIS — N17.9 AKI (ACUTE KIDNEY INJURY) (HCC): ICD-10-CM

## 2024-04-17 DIAGNOSIS — I10 ESSENTIAL HYPERTENSION: ICD-10-CM

## 2024-04-17 DIAGNOSIS — R80.9 MICROALBUMINURIA: ICD-10-CM

## 2024-04-17 DIAGNOSIS — E11.9 TYPE 2 DIABETES MELLITUS TREATED WITHOUT INSULIN (HCC): ICD-10-CM

## 2024-04-17 DIAGNOSIS — Z85.46 HISTORY OF PROSTATE CANCER: ICD-10-CM

## 2024-04-17 DIAGNOSIS — E11.9 TYPE 2 DIABETES MELLITUS TREATED WITHOUT INSULIN (HCC): Primary | ICD-10-CM

## 2024-04-17 DIAGNOSIS — M54.2 NECK PAIN: ICD-10-CM

## 2024-04-17 DIAGNOSIS — F51.01 PRIMARY INSOMNIA: ICD-10-CM

## 2024-04-17 DIAGNOSIS — I48.0 PAROXYSMAL A-FIB (HCC): ICD-10-CM

## 2024-04-17 DIAGNOSIS — R07.81 RIB PAIN ON LEFT SIDE: ICD-10-CM

## 2024-04-17 LAB
ANION GAP SERPL CALC-SCNC: 10 MEQ/L (ref 8–16)
BUN SERPL-MCNC: 34 MG/DL (ref 7–22)
CALCIUM SERPL-MCNC: 9.7 MG/DL (ref 8.5–10.5)
CHLORIDE SERPL-SCNC: 99 MEQ/L (ref 98–111)
CO2 SERPL-SCNC: 27 MEQ/L (ref 23–33)
CREAT SERPL-MCNC: 1.4 MG/DL (ref 0.4–1.2)
GFR SERPL CREATININE-BSD FRML MDRD: 53 ML/MIN/1.73M2
GLUCOSE SERPL-MCNC: 121 MG/DL (ref 70–108)
POTASSIUM SERPL-SCNC: 4.4 MEQ/L (ref 3.5–5.2)
SODIUM SERPL-SCNC: 136 MEQ/L (ref 135–145)

## 2024-04-17 PROCEDURE — 36415 COLL VENOUS BLD VENIPUNCTURE: CPT

## 2024-04-17 PROCEDURE — 80048 BASIC METABOLIC PNL TOTAL CA: CPT

## 2024-04-17 RX ORDER — DILTIAZEM HYDROCHLORIDE 240 MG/1
240 CAPSULE, COATED, EXTENDED RELEASE ORAL DAILY
Qty: 90 CAPSULE | Refills: 3 | Status: SHIPPED | OUTPATIENT
Start: 2024-04-17

## 2024-04-17 RX ORDER — DOXEPIN HYDROCHLORIDE 25 MG/1
CAPSULE ORAL
Qty: 90 CAPSULE | Refills: 3 | Status: SHIPPED | OUTPATIENT
Start: 2024-04-17

## 2024-04-17 ASSESSMENT — PATIENT HEALTH QUESTIONNAIRE - PHQ9
1. LITTLE INTEREST OR PLEASURE IN DOING THINGS: NOT AT ALL
SUM OF ALL RESPONSES TO PHQ QUESTIONS 1-9: 0
SUM OF ALL RESPONSES TO PHQ9 QUESTIONS 1 & 2: 0
SUM OF ALL RESPONSES TO PHQ QUESTIONS 1-9: 0
2. FEELING DOWN, DEPRESSED OR HOPELESS: NOT AT ALL

## 2024-04-17 NOTE — PROGRESS NOTES
patients  <18 years of age.  eGFR results are calculated without a race factor  using the 2021 CKD-EPI equation.  Careful clinical  correlation is recommended, particularly when comparing  to results calculated using previous equations. The  CKD-EPI equation is less accurate in patients with  extremes of muscle mass, extra-renal metabolism of  creatinine, excessive creatine ingestion, or following  therapy that affects renal tubular secretion.  [place] [address]            This office note may have been at least partially dictated. Effort was made to review for errors but some may have been missed. Please contact author of note for clarification if needed.     An electronic signature was used to authenticate this note.    --Romana Yoder MD

## 2024-04-18 ENCOUNTER — TELEPHONE (OUTPATIENT)
Dept: FAMILY MEDICINE CLINIC | Age: 74
End: 2024-04-18

## 2024-04-18 ASSESSMENT — ENCOUNTER SYMPTOMS
SHORTNESS OF BREATH: 0
BACK PAIN: 1

## 2024-04-18 NOTE — RESULT ENCOUNTER NOTE
Please let patient know that his follow BMP shows a higher Creatinine.  I recommend avoidance of nsaids and a renal ultrasound (ordered).  Thank you.

## 2024-04-18 NOTE — TELEPHONE ENCOUNTER
----- Message from Romana Yoder MD sent at 4/18/2024  1:55 PM EDT -----  Please let patient know that his follow BMP shows a higher Creatinine.  I recommend avoidance of nsaids and a renal ultrasound (ordered).  Thank you.

## 2024-04-23 NOTE — TELEPHONE ENCOUNTER
Patient notified  States he has been avoiding NSAIDs x 2 weeks  Central scheduling ph# given to schedule renal ultrasound

## 2024-05-02 ENCOUNTER — HOSPITAL ENCOUNTER (OUTPATIENT)
Dept: ULTRASOUND IMAGING | Age: 74
Discharge: HOME | End: 2024-05-02
Attending: FAMILY MEDICINE
Payer: MEDICARE

## 2024-05-02 ENCOUNTER — HOSPITAL ENCOUNTER (EMERGENCY)
Age: 74
Discharge: HOME | End: 2024-05-02
Attending: EMERGENCY MEDICINE
Payer: MEDICARE

## 2024-05-02 VITALS
SYSTOLIC BLOOD PRESSURE: 134 MMHG | WEIGHT: 193 LBS | HEART RATE: 86 BPM | BODY MASS INDEX: 26.14 KG/M2 | DIASTOLIC BLOOD PRESSURE: 71 MMHG | OXYGEN SATURATION: 95 % | RESPIRATION RATE: 16 BRPM | TEMPERATURE: 98 F | HEIGHT: 72 IN

## 2024-05-02 DIAGNOSIS — N17.9 AKI (ACUTE KIDNEY INJURY) (HCC): ICD-10-CM

## 2024-05-02 DIAGNOSIS — R79.89 ELEVATED SERUM CREATININE: ICD-10-CM

## 2024-05-02 DIAGNOSIS — I10 ESSENTIAL HYPERTENSION: ICD-10-CM
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
## 2024-05-02 DIAGNOSIS — R80.9 MICROALBUMINURIA: ICD-10-CM

## 2024-05-02 DIAGNOSIS — R80.9 MICROALBUMINURIA: Primary | ICD-10-CM

## 2024-05-02 DIAGNOSIS — S20.362A TICK BITE OF LEFT FRONT WALL OF THORAX, INITIAL ENCOUNTER: Primary | ICD-10-CM

## 2024-05-02 PROCEDURE — 99283 EMERGENCY DEPT VISIT LOW MDM: CPT

## 2024-05-02 PROCEDURE — 76770 US EXAM ABDO BACK WALL COMP: CPT

## 2024-05-02 RX ORDER — DOXYCYCLINE HYCLATE 100 MG
100 TABLET ORAL 2 TIMES DAILY
Qty: 20 TABLET | Refills: 0 | Status: SHIPPED | OUTPATIENT
Start: 2024-05-02 | End: 2024-05-12

## 2024-05-02 ASSESSMENT — PAIN - FUNCTIONAL ASSESSMENT: PAIN_FUNCTIONAL_ASSESSMENT: NONE - DENIES PAIN

## 2024-05-02 NOTE — ED PROVIDER NOTES
[facility]  [address] [place] [address]  Phone: [phone]  EMERGENCY DEPARTMENT ENCOUNTER      Pt Name: Jaron Hernandez  MRN: [id number]  Birthdate 1950  Date of evaluation: 5/2/2024  Provider: Lenard Cross MD    CHIEF COMPLAINT       Chief Complaint   Patient presents with    Insect Bite         HISTORY OF PRESENT ILLNESS      Jaorn Hernandez is a 74 y.o. male who presents to the emergency department with above-noted complaint.  While in [place] patient had a tick and pulled it off himself.  He was concerned he had some slight redness to the area although not target lesion he talked to his family member his pharmacist and they recommend antibiotics.  They picked at the area.        REVIEW OF SYSTEMS     Positive for tick bite.  No fever no drainage  Review of Systems  All systems negative except as marked.     PAST MEDICAL HISTORY     Past Medical History:   Diagnosis Date    Gout     Hyperlipidemia     Hypertension     Prostate cancer (HCC) 8/9/2011      External Beam radiation + Intersititial Brachytherapy for Rhodes score 4+3, PSA 3.6, stage T2A. Adenocarcinoma of the Prostate completed in October 2010.             SURGICAL HISTORY       Past Surgical History:   Procedure Laterality Date    COLONOSCOPY      JOINT REPLACEMENT      right hip    MA SCROTAL EXPLORATION Left 12/3/2020    LEFT TESTICLE  EXPLORATION AND WASH OUT performed by Shelton Murray MD at [facility] OR    PROSTATE SURGERY  2010    brachytherapy    TONSILLECTOMY      TOTAL HIP ARTHROPLASTY      right         CURRENT MEDICATIONS       Discharge Medication List as of 5/2/2024  1:17 PM        CONTINUE these medications which have NOT CHANGED    Details   dilTIAZem (CARDIZEM CD) 240 MG extended release capsule Take 1 capsule by mouth daily, Disp-90 capsule, R-3Normal      doxepin (SINEQUAN) 25 MG capsule take 1 capsule by mouth at bedtime if needed for insomnia, Disp-90 capsule, R-3Normal

## 2024-05-02 NOTE — ED NOTES
Patient presents today complaining of a tick bite while hiking in [place] on 4/27.  He noticed it about 12 hours later and noted that some of the tick was still stuck in his skin, he is concerned it needs removed or may need antibiotics.  Pin point scab noted to left abdomen.  Denies any fever or chills.

## 2024-05-02 NOTE — DISCHARGE INSTRUCTIONS
Monitor for infection redness or other problems.  Start doxycycline twice a day.  Avoid laying flat after taking doxycycline for at least 1 hour as we talked about.  Follow-up with primary care

## 2024-05-03 DIAGNOSIS — F41.9 ANXIETY: ICD-10-CM

## 2024-05-03 RX ORDER — LORAZEPAM 0.5 MG/1
0.5 TABLET ORAL EVERY 6 HOURS PRN
Qty: 30 TABLET | Refills: 0 | Status: SHIPPED | OUTPATIENT
Start: 2024-05-03 | End: 2024-08-01

## 2024-05-03 NOTE — TELEPHONE ENCOUNTER
----- Message from Jaron Hernandez sent at 5/2/2024  3:07 PM EDT -----  Regarding: lorazepam prescription  Contact: [phone]  Emerson Yoder, Would it be possible to get my prescription refilled for lorazepam?   thank you, Blu Hernandez

## 2024-05-03 NOTE — TELEPHONE ENCOUNTER
Jaron Hernandez called requesting a refill on the following medications:  Requested Prescriptions     Pending Prescriptions Disp Refills    LORazepam (ATIVAN) 0.5 MG tablet 30 tablet 0     Sig: Take 1 tablet by mouth every 6 hours as needed for Anxiety for up to 90 days. Max Daily Amount: 2 mg       Date of last visit: 4/17/2024  Date of next visit (if applicable):10/22/2024  Date of last refill: 10/26/23  Pharmacy Name: [facility],  Daniela Proctor LPN

## 2024-05-21 ENCOUNTER — OFFICE VISIT (OUTPATIENT)
Dept: CARDIOLOGY CLINIC | Age: 74
End: 2024-05-21
Payer: MEDICARE

## 2024-05-21 VITALS
DIASTOLIC BLOOD PRESSURE: 62 MMHG | BODY MASS INDEX: 26.01 KG/M2 | WEIGHT: 192 LBS | SYSTOLIC BLOOD PRESSURE: 116 MMHG | HEART RATE: 79 BPM | HEIGHT: 72 IN

## 2024-05-21 DIAGNOSIS — I48.0 PAF (PAROXYSMAL ATRIAL FIBRILLATION) (HCC): Primary | ICD-10-CM

## 2024-05-21 DIAGNOSIS — R06.02 SHORTNESS OF BREATH: ICD-10-CM

## 2024-05-21 DIAGNOSIS — I35.0 NONRHEUMATIC AORTIC VALVE STENOSIS: ICD-10-CM

## 2024-05-21 DIAGNOSIS — I10 ESSENTIAL HYPERTENSION: ICD-10-CM

## 2024-05-21 DIAGNOSIS — I20.9 ANGINA PECTORIS (HCC): ICD-10-CM

## 2024-05-21 PROCEDURE — 1036F TOBACCO NON-USER: CPT | Performed by: INTERNAL MEDICINE

## 2024-05-21 PROCEDURE — 93000 ELECTROCARDIOGRAM COMPLETE: CPT | Performed by: INTERNAL MEDICINE

## 2024-05-21 PROCEDURE — G8417 CALC BMI ABV UP PARAM F/U: HCPCS | Performed by: INTERNAL MEDICINE

## 2024-05-21 PROCEDURE — 1123F ACP DISCUSS/DSCN MKR DOCD: CPT | Performed by: INTERNAL MEDICINE

## 2024-05-21 PROCEDURE — 3078F DIAST BP <80 MM HG: CPT | Performed by: INTERNAL MEDICINE

## 2024-05-21 PROCEDURE — 3017F COLORECTAL CA SCREEN DOC REV: CPT | Performed by: INTERNAL MEDICINE

## 2024-05-21 PROCEDURE — G8427 DOCREV CUR MEDS BY ELIG CLIN: HCPCS | Performed by: INTERNAL MEDICINE

## 2024-05-21 PROCEDURE — 99214 OFFICE O/P EST MOD 30 MIN: CPT | Performed by: INTERNAL MEDICINE

## 2024-05-21 PROCEDURE — 3074F SYST BP LT 130 MM HG: CPT | Performed by: INTERNAL MEDICINE

## 2024-05-21 NOTE — PATIENT INSTRUCTIONS
Your  Nurses  Today:  Mo   Your Provider for Today: Dr. Peña          You may receive a survey regarding the care you received during your visit.  Your input is valuable to us.  We encourage you to complete and return your survey.  We hope you will choose us in the future for your healthcare needs.

## 2024-05-21 NOTE — PROGRESS NOTES
1 year follow up  EKG completed today,.   Med list up to date and pharmacy verified. Denies need for refills  Denies any cardiac concerns/symptoms today   
AM    ALT 29 04/10/2024 11:19 AM    AST 26 04/10/2024 11:19 AM    BILITOT 0.7 04/10/2024 11:19 AM    BILIDIR <0.2 04/14/2023 03:30 AM       Lab Results   Component Value Date/Time    MG 1.9 04/10/2024 11:19 AM       Lab Results   Component Value Date    INR 1.06 04/13/2023         Lab Results   Component Value Date/Time    LABA1C 6.6 04/10/2024 11:19 AM       Lab Results   Component Value Date/Time    TRIG 269 04/10/2024 11:19 AM    HDL 53 04/10/2024 11:19 AM       Lab Results   Component Value Date/Time    TSH 1.070 04/10/2024 11:19 AM         Testing Reviewed:      I have individually reviewed the cardiac test below:    ECHO:   Summary   Ejection fraction is visually estimated at 45%.   There was mild global hypokinesis of the left ventricle.      Signature      ----------------------------------------------------------------   Electronically signed by Kimberley Shirley MD (Interpreting   physician) on 04/13/2023 at 03:27 PM   ----------------------------------------------------------------    Echo 4/2023  Summary   Ejection fraction is visually estimated at 50%.   Overall left ventricular function is normal.   The aortic valve leaflets were not well visualized.   Thickened aortic valve leaflets noted.   Aortic valve leaflets are Moderately calcified.   Mild to moderate aortic stenosis is present.      Signature      ----------------------------------------------------------------   Electronically signed by Kimberley Shirley MD (Interpreting   physician) on 04/12/2023 at 06:26 PM   ----------------------------------------------------------------    Ekg:   EKG Interpretation:  Sinus Rhythm   -Right bundle branch block with left axis -bifascicular block.    ABNORMAL  .    AssessmentPlan:     Dyspnea on exertion   Aortic stenosis   Paroxysmal atrial flutter   S/p DCCV 12/2020 and 4/2023  Mild cardiomyopathy, ? Tachy induced   Chadsvasc score 3  HTN  Dyslipidemia     H/o AF with prior cardioversions*2, most recently

## 2024-05-29 ENCOUNTER — HOSPITAL ENCOUNTER (OUTPATIENT)
Age: 74
Discharge: HOME | End: 2024-05-29
Payer: MEDICARE

## 2024-05-29 ENCOUNTER — HOSPITAL ENCOUNTER (OUTPATIENT)
Dept: GENERAL RADIOLOGY | Age: 74
Discharge: HOME | End: 2024-05-29
Payer: MEDICARE

## 2024-05-29 DIAGNOSIS — M25.551 RIGHT HIP PAIN: ICD-10-CM

## 2024-05-29 DIAGNOSIS — R07.81 RIB PAIN ON LEFT SIDE: ICD-10-CM

## 2024-05-29 PROCEDURE — 73502 X-RAY EXAM HIP UNI 2-3 VIEWS: CPT

## 2024-05-29 PROCEDURE — 71101 X-RAY EXAM UNILAT RIBS/CHEST: CPT

## 2024-06-04 ENCOUNTER — HOSPITAL ENCOUNTER (OUTPATIENT)
Dept: NUCLEAR MEDICINE | Age: 74
Discharge: HOME | End: 2024-06-04
Attending: INTERNAL MEDICINE
Payer: MEDICARE

## 2024-06-04 ENCOUNTER — HOSPITAL ENCOUNTER (OUTPATIENT)
Age: 74
Discharge: HOME | End: 2024-06-06
Attending: INTERNAL MEDICINE
Payer: MEDICARE

## 2024-06-04 ENCOUNTER — TELEPHONE (OUTPATIENT)
Dept: CARDIOLOGY CLINIC | Age: 74
End: 2024-06-04

## 2024-06-04 VITALS
DIASTOLIC BLOOD PRESSURE: 62 MMHG | HEIGHT: 72 IN | SYSTOLIC BLOOD PRESSURE: 116 MMHG | WEIGHT: 192 LBS | BODY MASS INDEX: 26.01 KG/M2

## 2024-06-04 DIAGNOSIS — I20.9 ANGINA PECTORIS (HCC): ICD-10-CM

## 2024-06-04 DIAGNOSIS — R06.02 SHORTNESS OF BREATH: ICD-10-CM

## 2024-06-04 DIAGNOSIS — I35.0 NONRHEUMATIC AORTIC VALVE STENOSIS: ICD-10-CM

## 2024-06-04 LAB
ECHO AV AREA PEAK VELOCITY: 1.4 CM2
ECHO AV AREA VTI: 1.4 CM2
ECHO AV AREA/BSA PEAK VELOCITY: 0.7 CM2/M2
ECHO AV AREA/BSA VTI: 0.7 CM2/M2
ECHO AV CUSP MM: 1.8 CM
ECHO AV MEAN GRADIENT: 11 MMHG
ECHO AV MEAN VELOCITY: 1.6 M/S
ECHO AV PEAK GRADIENT: 17 MMHG
ECHO AV PEAK VELOCITY: 2.1 M/S
ECHO AV VELOCITY RATIO: 0.38
ECHO AV VTI: 47.7 CM
ECHO BSA: 2.1 M2
ECHO BSA: 2.1 M2
ECHO EST RA PRESSURE: 5 MMHG
ECHO LA AREA 2C: 14.3 CM2
ECHO LA AREA 4C: 14.9 CM2
ECHO LA DIAMETER INDEX: 1.44 CM/M2
ECHO LA DIAMETER: 3 CM
ECHO LA MAJOR AXIS: 5.5 CM
ECHO LA MINOR AXIS: 5.2 CM
ECHO LA VOL BP: 33 ML (ref 18–58)
ECHO LA VOL MOD A2C: 32 ML (ref 18–58)
ECHO LA VOL MOD A4C: 33 ML (ref 18–58)
ECHO LA VOL/BSA BIPLANE: 16 ML/M2 (ref 16–34)
ECHO LA VOLUME INDEX MOD A2C: 15 ML/M2 (ref 16–34)
ECHO LA VOLUME INDEX MOD A4C: 16 ML/M2 (ref 16–34)
ECHO LV E' LATERAL VELOCITY: 7 CM/S
ECHO LV E' SEPTAL VELOCITY: 6 CM/S
ECHO LV FRACTIONAL SHORTENING: 26 % (ref 28–44)
ECHO LV INTERNAL DIMENSION DIASTOLE INDEX: 2.2 CM/M2
ECHO LV INTERNAL DIMENSION DIASTOLIC: 4.6 CM (ref 4.2–5.9)
ECHO LV INTERNAL DIMENSION SYSTOLIC INDEX: 1.63 CM/M2
ECHO LV INTERNAL DIMENSION SYSTOLIC: 3.4 CM
ECHO LV ISOVOLUMETRIC RELAXATION TIME (IVRT): 85 MS
ECHO LV IVSD: 0.9 CM (ref 0.6–1)
ECHO LV MASS 2D: 137.7 G (ref 88–224)
ECHO LV MASS INDEX 2D: 65.9 G/M2 (ref 49–115)
ECHO LV POSTERIOR WALL DIASTOLIC: 0.9 CM (ref 0.6–1)
ECHO LV RELATIVE WALL THICKNESS RATIO: 0.39
ECHO LVOT AREA: 3.5 CM2
ECHO LVOT AV VTI INDEX: 0.4
ECHO LVOT DIAM: 2.1 CM
ECHO LVOT MEAN GRADIENT: 2 MMHG
ECHO LVOT PEAK GRADIENT: 3 MMHG
ECHO LVOT PEAK VELOCITY: 0.8 M/S
ECHO LVOT STROKE VOLUME INDEX: 31.8 ML/M2
ECHO LVOT SV: 66.5 ML
ECHO LVOT VTI: 19.2 CM
ECHO MV A VELOCITY: 0.87 M/S
ECHO MV E DECELERATION TIME (DT): 250 MS
ECHO MV E VELOCITY: 0.52 M/S
ECHO MV E/A RATIO: 0.6
ECHO MV E/E' LATERAL: 7.43
ECHO MV E/E' RATIO (AVERAGED): 8.05
ECHO MV E/E' SEPTAL: 8.67
ECHO PV MAX VELOCITY: 0.6 M/S
ECHO PV PEAK GRADIENT: 2 MMHG
ECHO RIGHT VENTRICULAR SYSTOLIC PRESSURE (RVSP): 17 MMHG
ECHO RV INTERNAL DIMENSION: 2.9 CM
ECHO RV TAPSE: 2.7 CM (ref 1.7–?)
ECHO TV E WAVE: 0.5 M/S
ECHO TV REGURGITANT MAX VELOCITY: 1.7 M/S
ECHO TV REGURGITANT PEAK GRADIENT: 12 MMHG
NUC STRESS EJECTION FRACTION: 66 %
STRESS BASELINE DIAS BP: 71 MMHG
STRESS BASELINE HR: 72 BPM
STRESS BASELINE SYS BP: 130 MMHG
STRESS STAGE 1 BP: NORMAL MMHG
STRESS STAGE 1 DURATION: 1 MIN:SEC
STRESS STAGE 1 HR: 79 BPM
STRESS STAGE 2 BP: NORMAL MMHG
STRESS STAGE 2 DURATION: 1 MIN:SEC
STRESS STAGE 2 HR: 91 BPM
STRESS STAGE 3 BP: NORMAL MMHG
STRESS STAGE 3 DURATION: 1 MIN:SEC
STRESS STAGE 3 HR: 90 BPM
STRESS STAGE RECOVERY 1 BP: NORMAL MMHG
STRESS STAGE RECOVERY 1 DURATION: 1 MIN:SEC
STRESS STAGE RECOVERY 1 HR: 85 BPM
STRESS STAGE RECOVERY 2 BP: NORMAL MMHG
STRESS STAGE RECOVERY 2 DURATION: 1 MIN:SEC
STRESS STAGE RECOVERY 2 HR: 82 BPM
STRESS STAGE RECOVERY 3 BP: NORMAL MMHG
STRESS STAGE RECOVERY 3 DURATION: 1 MIN:SEC
STRESS STAGE RECOVERY 3 HR: 80 BPM
STRESS STAGE RECOVERY 4 BP: NORMAL MMHG
STRESS STAGE RECOVERY 4 DURATION: 2 MIN:SEC
STRESS STAGE RECOVERY 4 HR: 78 BPM
STRESS TARGET HR: 147 BPM
TID: 1.06

## 2024-06-04 PROCEDURE — 3430000000 HC RX DIAGNOSTIC RADIOPHARMACEUTICAL: Performed by: INTERNAL MEDICINE

## 2024-06-04 PROCEDURE — A9500 TC99M SESTAMIBI: HCPCS | Performed by: INTERNAL MEDICINE

## 2024-06-04 PROCEDURE — 78452 HT MUSCLE IMAGE SPECT MULT: CPT

## 2024-06-04 PROCEDURE — 93017 CV STRESS TEST TRACING ONLY: CPT

## 2024-06-04 PROCEDURE — 93306 TTE W/DOPPLER COMPLETE: CPT

## 2024-06-04 PROCEDURE — 6360000002 HC RX W HCPCS: Performed by: INTERNAL MEDICINE

## 2024-06-04 RX ORDER — REGADENOSON 0.08 MG/ML
0.4 INJECTION, SOLUTION INTRAVENOUS ONCE
Status: COMPLETED | OUTPATIENT
Start: 2024-06-04 | End: 2024-06-04

## 2024-06-04 RX ORDER — TETRAKIS(2-METHOXYISOBUTYLISOCYANIDE)COPPER(I) TETRAFLUOROBORATE 1 MG/ML
31.3 INJECTION, POWDER, LYOPHILIZED, FOR SOLUTION INTRAVENOUS
Status: COMPLETED | OUTPATIENT
Start: 2024-06-04 | End: 2024-06-04

## 2024-06-04 RX ORDER — TETRAKIS(2-METHOXYISOBUTYLISOCYANIDE)COPPER(I) TETRAFLUOROBORATE 1 MG/ML
9.1 INJECTION, POWDER, LYOPHILIZED, FOR SOLUTION INTRAVENOUS
Status: COMPLETED | OUTPATIENT
Start: 2024-06-04 | End: 2024-06-04

## 2024-06-04 RX ADMIN — REGADENOSON 0.4 MG: 0.08 INJECTION, SOLUTION INTRAVENOUS at 10:59

## 2024-06-04 RX ADMIN — Medication 9.1 MILLICURIE: at 10:06

## 2024-06-04 RX ADMIN — Medication 31.3 MILLICURIE: at 11:00

## 2024-06-04 NOTE — TELEPHONE ENCOUNTER
Result note for Echo     ----- Message from Gaby Peña MD sent at 6/4/2024 10:46 AM EDT -----  EF has improved  Mild to moderate stenosis of the aortic valve is stable  Inform patient

## 2024-07-01 ENCOUNTER — HOSPITAL ENCOUNTER (OUTPATIENT)
Age: 74
Discharge: HOME | End: 2024-07-01
Payer: MEDICARE

## 2024-07-01 DIAGNOSIS — E78.5 HYPERLIPIDEMIA, UNSPECIFIED HYPERLIPIDEMIA TYPE: ICD-10-CM

## 2024-07-01 DIAGNOSIS — R80.9 MICROALBUMINURIA: ICD-10-CM

## 2024-07-01 DIAGNOSIS — Z85.46 HISTORY OF PROSTATE CANCER: ICD-10-CM

## 2024-07-01 DIAGNOSIS — E11.9 TYPE 2 DIABETES MELLITUS TREATED WITHOUT INSULIN (HCC): ICD-10-CM

## 2024-07-01 LAB
CREAT UR-MCNC: 159.9 MG/DL
MICROALBUMIN UR-MCNC: 11.17 MG/DL
MICROALBUMIN/CREAT RATIO PNL UR: 70 MG/G (ref 0–30)

## 2024-07-01 PROCEDURE — 82043 UR ALBUMIN QUANTITATIVE: CPT

## 2024-08-04 DIAGNOSIS — I48.0 PAROXYSMAL A-FIB (HCC): ICD-10-CM

## 2024-08-06 NOTE — CONSULTS
Liver US with doppler ordered with evidence of cirrhotic liver morphology with portal HTN, splenomegaly and moderate volume ascites. Last seen in hepatology clinic by Dr. Gutierres in 4/2023 with plans for TJ liver biopsy with pressure measurements, but seems patient was lost to follow up. LFTs and ALP wnl. Tbili today slightly elevated at 1.2 with direct bili 0.8.     Ascitic fluid studies with initial SAAG was 1.0 with total ascitic protein of 3.2. Repeat paracentesis with removal of 10L on 7/30 had SAAG of 1.5 with total ascitic protein of 2.9. Consistent with exudative fluid, as protein >2.5 g. If overload in setting of heart failure, would expect ascitic fluid to be transudative.    , troponin <0.006. EKG shows RBBB. Has been receiving IV furosemide 80 mg BID. Accuracy of UOP documentation questionable as pt has been reporting frequent urination, which do not match recorded I/O's. Pt did state to feel as if she was in afib all day on 8/1. She is currently compliant on metoprolol succinate 100 mg and nifedipine 60 mg for rate control at home. Also s/p watchman placement in 2023. 7/28 TTE with EF 60-65%, moderate RV enlargement, LA severe dilation, CVP 3 mmHg, moderate pulmonary hypertension with pulmonary artery systolic pressure 54 mmHg, and mild AS. S4 heart sound on exam, likely in setting of pulmonary HTN. Abdominal swelling also notably increased, though pt without c/o worsening CP or SOB. Lungs CTAB. Given low CVP, not convinced volume overload is d/t heart failure. Right heart catheterization performed this AM. Results show low right heart pressures and low PA pressure, which often suggests liver disease. There is no evidence that volume overload is cardiac in etiology. Will sign off on pt.    [facility]'s  Consult    Patient's Name/Date of Birth: Chang Boucher / 1950 (27 y.o.)    Date: April 12, 2023     Referring Provider: Mauri Teran MD    CHIEF COMPLAINT: Atrial flutter      HPI: This is a pleasant 68 y.o. male with a past medical history of atrial fibrillation, HTN, HLD, prostate cancer who presented to [facility] for irregular heart beat. Patient states he was at home and noticed for the last few days he was increasingly short of breath when walking. He then noticed the day of admission that he was feeling \"out of it\" and tried to take his BP but the machine kept giving an error message. He then had his daughter come over to take a manual BP but states his heart rate was irregular and he needed to go to the hospital prompting him to come in. At present he denies any chest pain, shortness of breath, left arm pain, numbness or tingling, jaw pain. Echo: Echo from 2020 shows an EF of 55%. Repeat echo is pending. All labs, EKG's, diagnostic testing and images as well as cardiac cath, stress testing were reviewed during this encounter    Past Medical History:   Diagnosis Date    Gout     Hyperlipidemia     Hypertension     Prostate cancer ([facility] [address] 8/9/2011      External Beam radiation + Intersititial Brachytherapy for [place] score 4+3, PSA 3.6, stage T2A. Adenocarcinoma of the Prostate completed in October 2010.          Past Surgical History:   Procedure Laterality Date    COLONOSCOPY      JOINT REPLACEMENT      right hip    MS SCROTAL EXPLORATION Left 12/3/2020    LEFT TESTICLE  EXPLORATION AND [address] OUT performed by Sharon Bence, MD at [address]  2010    brachytherapy    TONSILLECTOMY      TOTAL HIP ARTHROPLASTY      right     Current Facility-Administered Medications   Medication Dose Route Frequency Provider Last Rate Last Admin    aspirin EC tablet 81 mg  81 mg Oral Daily [facility], DO   81 mg at 04/12/23 0816    atorvastatin

## 2024-08-11 DIAGNOSIS — B00.1 COLD SORE: ICD-10-CM

## 2024-08-12 RX ORDER — VALACYCLOVIR HYDROCHLORIDE 1 G/1
1000 TABLET, FILM COATED ORAL 2 TIMES DAILY
Qty: 20 TABLET | OUTPATIENT
Start: 2024-08-12

## 2024-09-03 ENCOUNTER — HOSPITAL ENCOUNTER (OUTPATIENT)
Dept: CT IMAGING | Age: 74
Discharge: HOME | End: 2024-09-03
Attending: FAMILY MEDICINE
Payer: MEDICARE

## 2024-09-03 ENCOUNTER — OFFICE VISIT (OUTPATIENT)
Dept: FAMILY MEDICINE CLINIC | Age: 74
End: 2024-09-03

## 2024-09-03 VITALS
OXYGEN SATURATION: 96 % | HEART RATE: 113 BPM | WEIGHT: 187 LBS | DIASTOLIC BLOOD PRESSURE: 76 MMHG | BODY MASS INDEX: 25.33 KG/M2 | SYSTOLIC BLOOD PRESSURE: 132 MMHG | RESPIRATION RATE: 16 BRPM | HEIGHT: 72 IN

## 2024-09-03 DIAGNOSIS — R30.0 DYSURIA: ICD-10-CM

## 2024-09-03 DIAGNOSIS — N50.811 RIGHT TESTICULAR PAIN: ICD-10-CM

## 2024-09-03 DIAGNOSIS — N20.0 NEPHROLITHIASIS: ICD-10-CM

## 2024-09-03 DIAGNOSIS — Z85.46 HISTORY OF PROSTATE CANCER: ICD-10-CM

## 2024-09-03 DIAGNOSIS — R31.9 HEMATURIA, UNSPECIFIED TYPE: Primary | ICD-10-CM

## 2024-09-03 DIAGNOSIS — F41.9 ANXIETY: ICD-10-CM

## 2024-09-03 DIAGNOSIS — R31.9 HEMATURIA, UNSPECIFIED TYPE: ICD-10-CM

## 2024-09-03 LAB
BILIRUBIN, POC: NEGATIVE
BLOOD URINE, POC: NORMAL
CLARITY, POC: NORMAL
COLOR, POC: NORMAL
GLUCOSE URINE, POC: 500 MG/DL
KETONES, POC: NEGATIVE MG/DL
LEUKOCYTE EST, POC: NORMAL
NITRITE, POC: NEGATIVE
PH, POC: 6
PROTEIN, POC: 30 MG/DL
SPECIFIC GRAVITY, POC: 1.02
UROBILINOGEN, POC: 0.2 MG/DL

## 2024-09-03 PROCEDURE — 74176 CT ABD & PELVIS W/O CONTRAST: CPT

## 2024-09-03 RX ORDER — LORAZEPAM 0.5 MG/1
TABLET ORAL
Qty: 30 TABLET | Refills: 0 | Status: SHIPPED | OUTPATIENT
Start: 2024-09-03 | End: 2025-03-03

## 2024-09-03 RX ORDER — CIPROFLOXACIN 500 MG/1
500 TABLET, FILM COATED ORAL 2 TIMES DAILY
Qty: 20 TABLET | Refills: 0 | Status: SHIPPED | OUTPATIENT
Start: 2024-09-03 | End: 2024-09-13

## 2024-09-03 RX ORDER — ALPRAZOLAM 0.25 MG
0.25 TABLET ORAL NIGHTLY PRN
Qty: 1 TABLET | Refills: 0 | Status: CANCELLED | OUTPATIENT
Start: 2024-09-03 | End: 2024-09-04

## 2024-09-03 SDOH — ECONOMIC STABILITY: FOOD INSECURITY: WITHIN THE PAST 12 MONTHS, THE FOOD YOU BOUGHT JUST DIDN'T LAST AND YOU DIDN'T HAVE MONEY TO GET MORE.: NEVER TRUE

## 2024-09-03 SDOH — ECONOMIC STABILITY: INCOME INSECURITY: HOW HARD IS IT FOR YOU TO PAY FOR THE VERY BASICS LIKE FOOD, HOUSING, MEDICAL CARE, AND HEATING?: NOT HARD AT ALL

## 2024-09-03 SDOH — ECONOMIC STABILITY: FOOD INSECURITY: WITHIN THE PAST 12 MONTHS, YOU WORRIED THAT YOUR FOOD WOULD RUN OUT BEFORE YOU GOT MONEY TO BUY MORE.: NEVER TRUE

## 2024-09-03 NOTE — PROGRESS NOTES
Jaron Hernandez (:  1950) is a 74 y.o. male,Established patient, here for evaluation of the following chief complaint(s):  Hematuria (Noticed about 5-6 times on  evening but has since stopped. Burning with urination. Stated there has been some pain/swelling to his testicle. )      Assessment & Plan   ASSESSMENT/PLAN:  1. Hematuria, unspecified type  -     POCT Urinalysis No Micro (Auto)  -     CT ABDOMEN PELVIS WO CONTRAST Additional Contrast? None; Future  -     Culture, Urine  2. Anxiety  -     LORazepam (ATIVAN) 0.5 MG tablet; Use orally BID prn anxiety, Disp-30 tablet, R-0Normal  3. Dysuria  -     CT ABDOMEN PELVIS WO CONTRAST Additional Contrast? None; Future  -     ciprofloxacin (CIPRO) 500 MG tablet; Take 1 tablet by mouth 2 times daily for 10 days, Disp-20 tablet, R-0Normal  -     Culture, Urine  4. History of prostate cancer  -     CT ABDOMEN PELVIS WO CONTRAST Additional Contrast? None; Future  5. Right testicular pain  -     CT ABDOMEN PELVIS WO CONTRAST Additional Contrast? None; Future  -     US SCROTUM AND TESTICLES; Future  -     ciprofloxacin (CIPRO) 500 MG tablet; Take 1 tablet by mouth 2 times daily for 10 days, Disp-20 tablet, R-0Normal  6. Nephrolithiasis  -     CT ABDOMEN PELVIS WO CONTRAST Additional Contrast? None; Future      Return for 10/22.         Subjective   SUBJECTIVE/OBJECTIVE:  HPI    Patient with h/o prostate cancer, and left testicular cancer, along with h/o kidney stones presents with hematuria and dysuria. No fever/chills.   Pain when urine comes out but also settle  (Prostate history. External Beam radiation + Intersititial Brachytherapy for [place] score 4+3, PSA 3.6, stage T2A. Adenocarcinoma of the Prostate completed in 2010)    Lab Results   Component Value Date    PSA <0.02 04/10/2024     May 2024 US  IMPRESSION:  1. Left-sided nephrolithiasis.  2. Moderate amount of postvoid residual urine but otherwise unremarkable urinary bladder.    Having more

## 2024-09-05 LAB
BACTERIA UR CULT: ABNORMAL
ORGANISM: ABNORMAL

## 2024-09-09 ENCOUNTER — HOSPITAL ENCOUNTER (OUTPATIENT)
Dept: ULTRASOUND IMAGING | Age: 74
Discharge: HOME | End: 2024-09-09
Attending: FAMILY MEDICINE
Payer: MEDICARE

## 2024-09-09 DIAGNOSIS — N50.811 RIGHT TESTICULAR PAIN: ICD-10-CM

## 2024-09-09 PROCEDURE — 76870 US EXAM SCROTUM: CPT

## 2024-09-17 ENCOUNTER — OFFICE VISIT (OUTPATIENT)
Dept: SURGERY | Age: 74
End: 2024-09-17
Payer: MEDICARE

## 2024-09-17 VITALS
SYSTOLIC BLOOD PRESSURE: 114 MMHG | WEIGHT: 184.2 LBS | TEMPERATURE: 97.5 F | DIASTOLIC BLOOD PRESSURE: 62 MMHG | HEIGHT: 72 IN | BODY MASS INDEX: 24.95 KG/M2 | OXYGEN SATURATION: 97 % | HEART RATE: 76 BPM

## 2024-09-17 DIAGNOSIS — K40.90 LEFT INGUINAL HERNIA: ICD-10-CM

## 2024-09-17 DIAGNOSIS — K40.90 RIGHT INGUINAL HERNIA: Primary | ICD-10-CM

## 2024-09-17 DIAGNOSIS — Z01.818 PRE-OP TESTING: ICD-10-CM

## 2024-09-17 DIAGNOSIS — I10 ESSENTIAL HYPERTENSION: ICD-10-CM

## 2024-09-17 PROCEDURE — 3074F SYST BP LT 130 MM HG: CPT | Performed by: SURGERY

## 2024-09-17 PROCEDURE — 3078F DIAST BP <80 MM HG: CPT | Performed by: SURGERY

## 2024-09-17 PROCEDURE — 99204 OFFICE O/P NEW MOD 45 MIN: CPT | Performed by: SURGERY

## 2024-09-17 PROCEDURE — G8420 CALC BMI NORM PARAMETERS: HCPCS | Performed by: SURGERY

## 2024-09-17 PROCEDURE — G8427 DOCREV CUR MEDS BY ELIG CLIN: HCPCS | Performed by: SURGERY

## 2024-10-18 ENCOUNTER — HOSPITAL ENCOUNTER (OUTPATIENT)
Age: 74
Discharge: HOME | End: 2024-10-18
Payer: MEDICARE

## 2024-10-18 ENCOUNTER — OFFICE VISIT (OUTPATIENT)
Dept: FAMILY MEDICINE CLINIC | Age: 74
End: 2024-10-18

## 2024-10-18 VITALS
BODY MASS INDEX: 25.19 KG/M2 | WEIGHT: 186 LBS | HEIGHT: 72 IN | OXYGEN SATURATION: 96 % | HEART RATE: 69 BPM | DIASTOLIC BLOOD PRESSURE: 78 MMHG | SYSTOLIC BLOOD PRESSURE: 132 MMHG

## 2024-10-18 DIAGNOSIS — Z23 NEED FOR INFLUENZA VACCINATION: ICD-10-CM

## 2024-10-18 DIAGNOSIS — E78.2 MIXED HYPERCHOLESTEROLEMIA AND HYPERTRIGLYCERIDEMIA: ICD-10-CM

## 2024-10-18 DIAGNOSIS — F41.9 ANXIETY: ICD-10-CM

## 2024-10-18 DIAGNOSIS — F51.01 PRIMARY INSOMNIA: ICD-10-CM

## 2024-10-18 DIAGNOSIS — Z00.00 MEDICARE ANNUAL WELLNESS VISIT, SUBSEQUENT: Primary | ICD-10-CM

## 2024-10-18 DIAGNOSIS — E11.9 TYPE 2 DIABETES MELLITUS WITHOUT COMPLICATION, WITHOUT LONG-TERM CURRENT USE OF INSULIN (HCC): ICD-10-CM

## 2024-10-18 LAB
ANION GAP SERPL CALC-SCNC: 10 MEQ/L (ref 8–16)
BASOPHILS ABSOLUTE: 0 THOU/MM3 (ref 0–0.1)
BASOPHILS NFR BLD AUTO: 0.1 %
BUN SERPL-MCNC: 39 MG/DL (ref 7–22)
CALCIUM SERPL-MCNC: 10.1 MG/DL (ref 8.5–10.5)
CHLORIDE SERPL-SCNC: 101 MEQ/L (ref 98–111)
CO2 SERPL-SCNC: 27 MEQ/L (ref 23–33)
CREAT SERPL-MCNC: 1.4 MG/DL (ref 0.4–1.2)
DEPRECATED MEAN GLUCOSE BLD GHB EST-ACNC: 138 MG/DL (ref 70–126)
DEPRECATED RDW RBC AUTO: 43.1 FL (ref 35–45)
EOSINOPHIL NFR BLD AUTO: 0.9 %
EOSINOPHILS ABSOLUTE: 0.1 THOU/MM3 (ref 0–0.4)
ERYTHROCYTE [DISTWIDTH] IN BLOOD BY AUTOMATED COUNT: 12.7 % (ref 11.5–14.5)
GFR SERPL CREATININE-BSD FRML MDRD: 53 ML/MIN/1.73M2
GLUCOSE SERPL-MCNC: 136 MG/DL (ref 70–108)
HBA1C MFR BLD HPLC: 6.6 % (ref 4.4–6.4)
HCT VFR BLD AUTO: 39.8 % (ref 42–52)
HGB BLD-MCNC: 13.1 GM/DL (ref 14–18)
IMM GRANULOCYTES # BLD AUTO: 0.03 THOU/MM3 (ref 0–0.07)
IMM GRANULOCYTES NFR BLD AUTO: 0.4 %
LYMPHOCYTES ABSOLUTE: 1.3 THOU/MM3 (ref 1–4.8)
LYMPHOCYTES NFR BLD AUTO: 19 %
MCH RBC QN AUTO: 30.3 PG (ref 26–33)
MCHC RBC AUTO-ENTMCNC: 32.9 GM/DL (ref 32.2–35.5)
MCV RBC AUTO: 92.1 FL (ref 80–94)
MONOCYTES ABSOLUTE: 0.6 THOU/MM3 (ref 0.4–1.3)
MONOCYTES NFR BLD AUTO: 9.4 %
NEUTROPHILS ABSOLUTE: 4.7 THOU/MM3 (ref 1.8–7.7)
NEUTROPHILS NFR BLD AUTO: 70.2 %
NRBC BLD AUTO-RTO: 0 /100 WBC
PLATELET # BLD AUTO: 339 THOU/MM3 (ref 130–400)
PMV BLD AUTO: 9.7 FL (ref 9.4–12.4)
POTASSIUM SERPL-SCNC: 4.4 MEQ/L (ref 3.5–5.2)
RBC # BLD AUTO: 4.32 MILL/MM3 (ref 4.7–6.1)
SODIUM SERPL-SCNC: 138 MEQ/L (ref 135–145)
WBC # BLD AUTO: 6.7 THOU/MM3 (ref 4.8–10.8)

## 2024-10-18 PROCEDURE — 80048 BASIC METABOLIC PNL TOTAL CA: CPT

## 2024-10-18 PROCEDURE — 85025 COMPLETE CBC W/AUTO DIFF WBC: CPT

## 2024-10-18 PROCEDURE — 83036 HEMOGLOBIN GLYCOSYLATED A1C: CPT

## 2024-10-18 PROCEDURE — 36415 COLL VENOUS BLD VENIPUNCTURE: CPT

## 2024-10-18 RX ORDER — DOXEPIN HYDROCHLORIDE 25 MG/1
CAPSULE ORAL
Qty: 180 CAPSULE | Refills: 1 | Status: SHIPPED | OUTPATIENT
Start: 2024-10-18

## 2024-10-18 RX ORDER — LORAZEPAM 0.5 MG/1
TABLET ORAL
Qty: 30 TABLET | Refills: 0 | Status: SHIPPED | OUTPATIENT
Start: 2024-10-18 | End: 2025-04-17

## 2024-10-18 RX ORDER — SIMVASTATIN 10 MG
10 TABLET ORAL NIGHTLY
Qty: 90 TABLET | Refills: 1 | Status: SHIPPED | OUTPATIENT
Start: 2024-10-18

## 2024-10-18 SDOH — HEALTH STABILITY: PHYSICAL HEALTH: ON AVERAGE, HOW MANY MINUTES DO YOU ENGAGE IN EXERCISE AT THIS LEVEL?: 40 MIN

## 2024-10-18 SDOH — HEALTH STABILITY: PHYSICAL HEALTH: ON AVERAGE, HOW MANY DAYS PER WEEK DO YOU ENGAGE IN MODERATE TO STRENUOUS EXERCISE (LIKE A BRISK WALK)?: 4 DAYS

## 2024-10-18 ASSESSMENT — PATIENT HEALTH QUESTIONNAIRE - PHQ9
SUM OF ALL RESPONSES TO PHQ9 QUESTIONS 1 & 2: 2
SUM OF ALL RESPONSES TO PHQ QUESTIONS 1-9: 2
2. FEELING DOWN, DEPRESSED OR HOPELESS: SEVERAL DAYS
SUM OF ALL RESPONSES TO PHQ QUESTIONS 1-9: 2
1. LITTLE INTEREST OR PLEASURE IN DOING THINGS: SEVERAL DAYS

## 2024-10-18 ASSESSMENT — LIFESTYLE VARIABLES
HOW MANY STANDARD DRINKS CONTAINING ALCOHOL DO YOU HAVE ON A TYPICAL DAY: 3 OR 4
HAVE YOU OR SOMEONE ELSE BEEN INJURED AS A RESULT OF YOUR DRINKING: NO
HOW OFTEN DURING THE LAST YEAR HAVE YOU FAILED TO DO WHAT WAS NORMALLY EXPECTED FROM YOU BECAUSE OF DRINKING: NEVER
HOW OFTEN DURING THE LAST YEAR HAVE YOU FOUND THAT YOU WERE NOT ABLE TO STOP DRINKING ONCE YOU HAD STARTED: NEVER
HOW OFTEN DURING THE LAST YEAR HAVE YOU FOUND THAT YOU WERE NOT ABLE TO STOP DRINKING ONCE YOU HAD STARTED: NEVER
HOW OFTEN DURING THE LAST YEAR HAVE YOU NEEDED AN ALCOHOLIC DRINK FIRST THING IN THE MORNING TO GET YOURSELF GOING AFTER A NIGHT OF HEAVY DRINKING: NEVER
HOW OFTEN DO YOU HAVE SIX OR MORE DRINKS ON ONE OCCASION: 1
HOW OFTEN DURING THE LAST YEAR HAVE YOU HAD A FEELING OF GUILT OR REMORSE AFTER DRINKING: NEVER
HOW OFTEN DO YOU HAVE A DRINK CONTAINING ALCOHOL: 2-4 TIMES A MONTH
HAS A RELATIVE, FRIEND, DOCTOR, OR ANOTHER HEALTH PROFESSIONAL EXPRESSED CONCERN ABOUT YOUR DRINKING OR SUGGESTED YOU CUT DOWN: NO
HOW OFTEN DURING THE LAST YEAR HAVE YOU BEEN UNABLE TO REMEMBER WHAT HAPPENED THE NIGHT BEFORE BECAUSE YOU HAD BEEN DRINKING: NEVER
HOW OFTEN DO YOU HAVE A DRINK CONTAINING ALCOHOL: 3
HAS A RELATIVE, FRIEND, DOCTOR, OR ANOTHER HEALTH PROFESSIONAL EXPRESSED CONCERN ABOUT YOUR DRINKING OR SUGGESTED YOU CUT DOWN: NO
HAVE YOU OR SOMEONE ELSE BEEN INJURED AS A RESULT OF YOUR DRINKING: NO
HOW OFTEN DURING THE LAST YEAR HAVE YOU NEEDED AN ALCOHOLIC DRINK FIRST THING IN THE MORNING TO GET YOURSELF GOING AFTER A NIGHT OF HEAVY DRINKING: NEVER
HOW OFTEN DURING THE LAST YEAR HAVE YOU FAILED TO DO WHAT WAS NORMALLY EXPECTED FROM YOU BECAUSE OF DRINKING: NEVER
HOW MANY STANDARD DRINKS CONTAINING ALCOHOL DO YOU HAVE ON A TYPICAL DAY: 2
HOW OFTEN DURING THE LAST YEAR HAVE YOU HAD A FEELING OF GUILT OR REMORSE AFTER DRINKING: NEVER
HOW OFTEN DURING THE LAST YEAR HAVE YOU BEEN UNABLE TO REMEMBER WHAT HAPPENED THE NIGHT BEFORE BECAUSE YOU HAD BEEN DRINKING: NEVER

## 2024-10-18 NOTE — PROGRESS NOTES
Medicare Annual Wellness Visit    aJron Hernandez is here for Medicare AWV    Assessment & Plan   Medicare annual wellness visit, subsequent  -      DIABETES FOOT EXAM  Type 2 diabetes mellitus without complication, without long-term current use of insulin (HCC)  -      DIABETES FOOT EXAM  -     Hemoglobin A1C; Future  -     CBC with Auto Differential; Future  -     Basic Metabolic Panel; Future  Anxiety  -     LORazepam (ATIVAN) 0.5 MG tablet; Use orally BID prn anxiety, Disp-30 tablet, R-0Normal  -     Hemoglobin A1C; Future  -     CBC with Auto Differential; Future  -     Basic Metabolic Panel; Future  Primary insomnia  -     doxepin (SINEQUAN) 25 MG capsule; take 1-2  capsule by mouth at bedtime if needed for insomnia, Disp-180 capsule, R-1Normal  -     Hemoglobin A1C; Future  -     CBC with Auto Differential; Future  -     Basic Metabolic Panel; Future  Mixed hypercholesterolemia and hypertriglyceridemia  -     simvastatin (ZOCOR) 10 MG tablet; Take 1 tablet by mouth nightly, Disp-90 tablet, R-1Normal  -     Hemoglobin A1C; Future  -     CBC with Auto Differential; Future  -     Basic Metabolic Panel; Future  Need for influenza vaccination  -     Influenza, FLUAD Trivalent, (age 65 y+), IM, Preservative Free, 0.5mL    Recommendations for Preventive Services Due: see orders and patient instructions/AVS.    Improved urinary and pelvic issues.  Decreasing soda is next goal, may help both bladder irritation and metabolic issues.  Increase activity   Chiropractor for core and rib issues     We discussed Ativan refilled.  He admits to stress and difficulty sleeping.  He is able to take doxepin and we can increase the dose.  He will try that for now.  We discussed the habit-forming nature of benzodiazepines and cognitive issues reported with long-term use.    Recommended screening schedule for the next 5-10 years is provided to the patient in written form: see Patient Instructions/AVS.     No follow-ups on file.

## 2024-10-18 NOTE — PATIENT INSTRUCTIONS

## 2024-12-13 ENCOUNTER — OFFICE VISIT (OUTPATIENT)
Dept: FAMILY MEDICINE CLINIC | Age: 74
End: 2024-12-13
Payer: MEDICARE

## 2024-12-13 VITALS
RESPIRATION RATE: 18 BRPM | SYSTOLIC BLOOD PRESSURE: 138 MMHG | WEIGHT: 190 LBS | OXYGEN SATURATION: 98 % | HEART RATE: 89 BPM | DIASTOLIC BLOOD PRESSURE: 72 MMHG | BODY MASS INDEX: 25.77 KG/M2

## 2024-12-13 DIAGNOSIS — I10 ESSENTIAL HYPERTENSION: ICD-10-CM

## 2024-12-13 DIAGNOSIS — I48.0 PAROXYSMAL A-FIB (HCC): ICD-10-CM

## 2024-12-13 DIAGNOSIS — B00.1 COLD SORE: ICD-10-CM

## 2024-12-13 DIAGNOSIS — M70.21 OLECRANON BURSITIS OF RIGHT ELBOW: Primary | ICD-10-CM

## 2024-12-13 PROCEDURE — 3078F DIAST BP <80 MM HG: CPT | Performed by: FAMILY MEDICINE

## 2024-12-13 PROCEDURE — 1160F RVW MEDS BY RX/DR IN RCRD: CPT | Performed by: FAMILY MEDICINE

## 2024-12-13 PROCEDURE — G8427 DOCREV CUR MEDS BY ELIG CLIN: HCPCS | Performed by: FAMILY MEDICINE

## 2024-12-13 PROCEDURE — 1123F ACP DISCUSS/DSCN MKR DOCD: CPT | Performed by: FAMILY MEDICINE

## 2024-12-13 PROCEDURE — G8417 CALC BMI ABV UP PARAM F/U: HCPCS | Performed by: FAMILY MEDICINE

## 2024-12-13 PROCEDURE — 3075F SYST BP GE 130 - 139MM HG: CPT | Performed by: FAMILY MEDICINE

## 2024-12-13 PROCEDURE — 3017F COLORECTAL CA SCREEN DOC REV: CPT | Performed by: FAMILY MEDICINE

## 2024-12-13 PROCEDURE — 1036F TOBACCO NON-USER: CPT | Performed by: FAMILY MEDICINE

## 2024-12-13 PROCEDURE — G8482 FLU IMMUNIZE ORDER/ADMIN: HCPCS | Performed by: FAMILY MEDICINE

## 2024-12-13 PROCEDURE — 99214 OFFICE O/P EST MOD 30 MIN: CPT | Performed by: FAMILY MEDICINE

## 2024-12-13 PROCEDURE — 1159F MED LIST DOCD IN RCRD: CPT | Performed by: FAMILY MEDICINE

## 2024-12-13 RX ORDER — LISINOPRIL AND HYDROCHLOROTHIAZIDE 20; 25 MG/1; MG/1
1.5 TABLET ORAL DAILY
Qty: 135 TABLET | Refills: 3 | Status: SHIPPED | OUTPATIENT
Start: 2024-12-13

## 2024-12-13 RX ORDER — DILTIAZEM HYDROCHLORIDE 240 MG/1
240 CAPSULE, COATED, EXTENDED RELEASE ORAL DAILY
Qty: 90 CAPSULE | Refills: 3 | Status: SHIPPED | OUTPATIENT
Start: 2024-12-13

## 2024-12-13 RX ORDER — VALACYCLOVIR HYDROCHLORIDE 1 G/1
1000 TABLET, FILM COATED ORAL 2 TIMES DAILY
Qty: 60 TABLET | Refills: 3 | Status: SHIPPED | OUTPATIENT
Start: 2024-12-13

## 2024-12-13 RX ORDER — NAPROXEN 500 MG/1
500 TABLET ORAL 2 TIMES DAILY WITH MEALS
Qty: 30 TABLET | Refills: 0 | Status: SHIPPED | OUTPATIENT
Start: 2024-12-13 | End: 2024-12-28

## 2024-12-13 NOTE — PROGRESS NOTES
Jaron Hernandez (:  1950) is a 74 y.o. male,Established patient, here for evaluation of the following chief complaint(s):  Elbow Injury (Injury to elbow, no pain- swollen elbow x2 weeks)      Assessment & Plan   ASSESSMENT/PLAN:  1. Olecranon bursitis of right elbow  -     naproxen (NAPROSYN) 500 MG tablet; Take 1 tablet by mouth 2 times daily (with meals) for 15 days, Disp-30 tablet, R-0Normal  2. Essential hypertension  -     dilTIAZem (CARDIZEM CD) 240 MG extended release capsule; Take 1 capsule by mouth daily, Disp-90 capsule, R-3Normal  -     lisinopril-hydroCHLOROthiazide (PRINZIDE;ZESTORETIC) 20-25 MG per tablet; Take 1.5 tablets by mouth daily, Disp-135 tablet, R-3Normal  3. Paroxysmal A-fib (HCC)  -     dilTIAZem (CARDIZEM CD) 240 MG extended release capsule; Take 1 capsule by mouth daily, Disp-90 capsule, R-3Normal  4. Cold sore  -     valACYclovir (VALTREX) 1 g tablet; Take 1 tablet by mouth 2 times daily, Disp-60 tablet, R-3Normal      Assessment & Plan  1. Right elbow bursitis, traumatic -- patient on xarelto, hematoma likely contributing.  The condition is identified as traumatic bursitis, which typically resolves spontaneously over time. The absence of warmth or redness currently. He is advised to apply warm compresses to the affected area for 5 minutes once or twice daily and to use a brace or figure-of-eight bandage as needed. Gentle massage of the area is also recommended. If pain occurs after significant arm use, icing the elbow is suggested. A prescription for Naprosyn 500 mg, to be taken twice daily for 1 to 2 weeks, will be provided -- briefly only due to xarelto use and kidney health. Alternatively, meloxicam can be used for a similar duration. If there is no improvement within 2 to 3 weeks or worsening or changes concerning for infection, drainage of the bursa may be considered.    2. Hypertension  Continue on current medication.  Refilled.     3.  Paroxysmal atrial

## 2025-03-18 ENCOUNTER — TELEPHONE (OUTPATIENT)
Dept: CARDIOLOGY CLINIC | Age: 75
End: 2025-03-18

## 2025-03-18 NOTE — TELEPHONE ENCOUNTER
Spoke with pt.  Appt scheduled.    Spoke with Nell at [facility] medical records.  Nell  will fax over results.      Please leave encounter open to make sure we get results.

## 2025-03-18 NOTE — TELEPHONE ENCOUNTER
Zenaida from [facility] radiology called- pt had a critical calcium scoring of 2,367 today.  He has a follow up with Dr. Peña on 4/29/25. Please advise.

## 2025-03-25 ENCOUNTER — OFFICE VISIT (OUTPATIENT)
Dept: CARDIOLOGY CLINIC | Age: 75
End: 2025-03-25
Payer: MEDICARE

## 2025-03-25 VITALS
HEART RATE: 92 BPM | WEIGHT: 198 LBS | SYSTOLIC BLOOD PRESSURE: 106 MMHG | DIASTOLIC BLOOD PRESSURE: 54 MMHG | HEIGHT: 72 IN | BODY MASS INDEX: 26.82 KG/M2

## 2025-03-25 DIAGNOSIS — R93.1 ELEVATED CORONARY ARTERY CALCIUM SCORE: ICD-10-CM

## 2025-03-25 DIAGNOSIS — E78.2 MIXED HYPERCHOLESTEROLEMIA AND HYPERTRIGLYCERIDEMIA: ICD-10-CM

## 2025-03-25 DIAGNOSIS — R06.02 SOB (SHORTNESS OF BREATH): Primary | ICD-10-CM

## 2025-03-25 DIAGNOSIS — I48.0 PAROXYSMAL A-FIB (HCC): ICD-10-CM

## 2025-03-25 PROCEDURE — G8427 DOCREV CUR MEDS BY ELIG CLIN: HCPCS | Performed by: PHYSICIAN ASSISTANT

## 2025-03-25 PROCEDURE — G8417 CALC BMI ABV UP PARAM F/U: HCPCS | Performed by: PHYSICIAN ASSISTANT

## 2025-03-25 PROCEDURE — 3078F DIAST BP <80 MM HG: CPT | Performed by: PHYSICIAN ASSISTANT

## 2025-03-25 PROCEDURE — 1036F TOBACCO NON-USER: CPT | Performed by: PHYSICIAN ASSISTANT

## 2025-03-25 PROCEDURE — 99214 OFFICE O/P EST MOD 30 MIN: CPT | Performed by: PHYSICIAN ASSISTANT

## 2025-03-25 PROCEDURE — 3074F SYST BP LT 130 MM HG: CPT | Performed by: PHYSICIAN ASSISTANT

## 2025-03-25 PROCEDURE — 3017F COLORECTAL CA SCREEN DOC REV: CPT | Performed by: PHYSICIAN ASSISTANT

## 2025-03-25 PROCEDURE — 1123F ACP DISCUSS/DSCN MKR DOCD: CPT | Performed by: PHYSICIAN ASSISTANT

## 2025-03-25 PROCEDURE — 1159F MED LIST DOCD IN RCRD: CPT | Performed by: PHYSICIAN ASSISTANT

## 2025-03-25 RX ORDER — GINSENG 100 MG
50 CAPSULE ORAL DAILY
COMMUNITY

## 2025-03-25 RX ORDER — SELENIUM 200 MCG
200 TABLET ORAL DAILY
COMMUNITY

## 2025-03-25 RX ORDER — SIMVASTATIN 40 MG
40 TABLET ORAL NIGHTLY
Qty: 90 TABLET | Refills: 3 | Status: SHIPPED | OUTPATIENT
Start: 2025-03-25

## 2025-03-25 RX ORDER — ACETAMINOPHEN 160 MG
2000 TABLET,DISINTEGRATING ORAL DAILY
COMMUNITY

## 2025-03-25 RX ORDER — MAGNESIUM GLUCONATE 27 MG(500)
500 TABLET ORAL 2 TIMES DAILY
COMMUNITY

## 2025-03-25 NOTE — PATIENT INSTRUCTIONS
You may receive a survey regarding the care you received during your visit. We encourage you to complete and return your survey, as your input is valuable to us. We hope you will choose us in the future for your healthcare needs. Thank you!    Your Medical Assistant today: Kristopher  Thank you for coming to our office! It was a pleasure to serve you.

## 2025-03-26 ENCOUNTER — TELEPHONE (OUTPATIENT)
Dept: CARDIOLOGY CLINIC | Age: 75
End: 2025-03-26

## 2025-03-26 NOTE — PROGRESS NOTES
[facility] [place] [facility]  [facility] CARDIOLOGY  [address]  [address]  [facility] [address]  Dept: [phone]  Dept Fax: [phone]  Loc: [phone]    Chief Complaint   Patient presents with    Follow-up       History of Present Illness  The patient is a 75-year-old gentleman who recently had a CT with calcium scoring, presenting for a follow-up.    The calcium scoring test was initiated due to a history of smoking and the influence of peers. No recent episodes of atrial fibrillation are reported, with the last recorded episode in 07/2023. Non-acute shortness of breath is experienced, attributed to increased physical exertion. An exercise regimen includes rowing 2 to 3 times a week for 30 minutes and weight training, without associated issues. Mild dizziness is occasionally experienced post-exercise. Heart rate during workouts typically ranges between 115 and 135, occasionally spiking to 150, prompting a reduction in workout intensity. No chest pain or discomfort is experienced during these episodes. Simvastatin has been taken since its introduction without adverse effects, and an increase in dosage was suggested by the primary care physician.      Blood pressure is regularly monitored at home, typically ranging between 120/70 and 140/80.    A past medical history includes atrial flutter, high cholesterol, and hypertension. Current medications include diltiazem 240 mg daily, lisinopril/hydrochlorothiazide combo pill 0.5 to 1.5 tablets daily, Xarelto 20 mg daily, and simvastatin 10 mg at night.    SOCIAL HISTORY  - Exercise: Rowing two or three times a week for 30 minutes; weight training  - Tobacco: Smoked cigarettes in the past; quit in 2017      FAMILY HISTORY  - Mother: Congestive heart failure  - Sister: Prediabetic  - Suspected family history of diabetes in both parents       Cardiologist:  Dr. Peña        General:   No fever, no chills, No fatigue or weight

## 2025-03-26 NOTE — TELEPHONE ENCOUNTER
Jaron Hernandez \"Blu\" to [facility] Clinical Staff (supporting Gaby Peña MD)         3/26/25  9:47 AM  i have a couple of questions:  i would like to continue with my workouts(as discussed, rowing and weight training). What are your thoughts on this?  In addition, the dosage of my simvastatin was increased from 10 mg to 40 mg daily.  Would it be advisable to take 4 of the 10 mg until they run out, then begin with the new dosage? Thanks Blu Hernandez

## 2025-04-09 NOTE — PRE-PROCEDURE INSTRUCTIONS
Notified of Heart Cath procedure arrival time 0600 on Monday  [place] on 2nd floor [facility]  NPO after midnight  Bring drivers license and insurance information  Wear comfortable clean clothes  Shower morning of and night before with liquid antibacterial soap  Remove jewelry   May have to stay overnight if have PTCA/stent - bring small overnight bag  Bring medications in original bottles - may take am meds with small amount of water.    Do not take any diabetic meds day of procedure.  Made aware of visitors limit to 2 at a time  Follow all instructions given by your physician  Please notify doctor office if you need to cancel or reschedule your procedure   needed at discharge  If you use CPap or BiPap for sleep apnea, please bring machine with you  Leave valuables at home

## 2025-04-11 ENCOUNTER — PREP FOR PROCEDURE (OUTPATIENT)
Dept: CARDIOLOGY | Age: 75
End: 2025-04-11

## 2025-04-11 RX ORDER — NITROGLYCERIN 0.4 MG/1
0.4 TABLET SUBLINGUAL EVERY 5 MIN PRN
Status: CANCELLED | OUTPATIENT
Start: 2025-04-11

## 2025-04-11 RX ORDER — SODIUM CHLORIDE 9 MG/ML
INJECTION, SOLUTION INTRAVENOUS CONTINUOUS
Status: CANCELLED | OUTPATIENT
Start: 2025-04-11

## 2025-04-11 RX ORDER — SODIUM CHLORIDE 0.9 % (FLUSH) 0.9 %
5-40 SYRINGE (ML) INJECTION EVERY 12 HOURS SCHEDULED
Status: CANCELLED | OUTPATIENT
Start: 2025-04-11

## 2025-04-11 RX ORDER — HYDROCORTISONE SODIUM SUCCINATE 100 MG/2ML
200 INJECTION INTRAMUSCULAR; INTRAVENOUS ONCE
Status: CANCELLED | OUTPATIENT
Start: 2025-04-11 | End: 2025-04-11

## 2025-04-11 RX ORDER — SODIUM CHLORIDE 0.9 % (FLUSH) 0.9 %
5-40 SYRINGE (ML) INJECTION PRN
Status: CANCELLED | OUTPATIENT
Start: 2025-04-11

## 2025-04-11 RX ORDER — DIPHENHYDRAMINE HYDROCHLORIDE 50 MG/ML
50 INJECTION, SOLUTION INTRAMUSCULAR; INTRAVENOUS ONCE
Status: CANCELLED | OUTPATIENT
Start: 2025-04-11 | End: 2025-04-11

## 2025-04-11 RX ORDER — ASPIRIN 325 MG
325 TABLET ORAL ONCE
Status: CANCELLED | OUTPATIENT
Start: 2025-04-11 | End: 2025-04-11

## 2025-04-14 ENCOUNTER — HOSPITAL ENCOUNTER (OUTPATIENT)
Age: 75
Setting detail: OUTPATIENT SURGERY
Discharge: HOME | End: 2025-04-14
Attending: INTERNAL MEDICINE | Admitting: INTERNAL MEDICINE
Payer: MEDICARE

## 2025-04-14 VITALS
SYSTOLIC BLOOD PRESSURE: 113 MMHG | DIASTOLIC BLOOD PRESSURE: 66 MMHG | HEART RATE: 77 BPM | TEMPERATURE: 97.8 F | RESPIRATION RATE: 16 BRPM | HEIGHT: 72 IN | BODY MASS INDEX: 25.81 KG/M2 | WEIGHT: 190.6 LBS | OXYGEN SATURATION: 92 %

## 2025-04-14 DIAGNOSIS — R06.02 SOB (SHORTNESS OF BREATH): ICD-10-CM

## 2025-04-14 LAB
ABO GROUP BLD: NORMAL
ANION GAP SERPL CALC-SCNC: 12 MEQ/L (ref 8–16)
APTT PPP: 26.4 SECONDS (ref 22–38)
BUN SERPL-MCNC: 36 MG/DL (ref 8–23)
CALCIUM SERPL-MCNC: 9.6 MG/DL (ref 8.8–10.2)
CHLORIDE SERPL-SCNC: 102 MEQ/L (ref 98–111)
CO2 SERPL-SCNC: 24 MEQ/L (ref 22–29)
CREAT SERPL-MCNC: 1.6 MG/DL (ref 0.7–1.2)
DEPRECATED RDW RBC AUTO: 42.4 FL (ref 35–45)
ECHO BSA: 2.1 M2
EKG ATRIAL RATE: 83 BPM
EKG P AXIS: 16 DEGREES
EKG P-R INTERVAL: 186 MS
EKG Q-T INTERVAL: 410 MS
EKG QRS DURATION: 126 MS
EKG QTC CALCULATION (BAZETT): 481 MS
EKG R AXIS: -66 DEGREES
EKG T AXIS: 56 DEGREES
EKG VENTRICULAR RATE: 83 BPM
ERYTHROCYTE [DISTWIDTH] IN BLOOD BY AUTOMATED COUNT: 12.5 % (ref 11.5–14.5)
GFR SERPL CREATININE-BSD FRML MDRD: 45 ML/MIN/1.73M2
GLUCOSE SERPL-MCNC: 110 MG/DL (ref 74–109)
HCT VFR BLD AUTO: 39.9 % (ref 42–52)
HGB BLD-MCNC: 13.6 GM/DL (ref 14–18)
IAT IGG-SP REAG SERPL QL: NORMAL
INR PPP: 1.03 (ref 0.85–1.13)
MCH RBC QN AUTO: 31.5 PG (ref 26–33)
MCHC RBC AUTO-ENTMCNC: 34.1 GM/DL (ref 32.2–35.5)
MCV RBC AUTO: 92.4 FL (ref 80–94)
PLATELET # BLD AUTO: 260 THOU/MM3 (ref 130–400)
PMV BLD AUTO: 9.7 FL (ref 9.4–12.4)
POTASSIUM SERPL-SCNC: 3.9 MEQ/L (ref 3.5–5.2)
RBC # BLD AUTO: 4.32 MILL/MM3 (ref 4.7–6.1)
RH BLD: NORMAL
SODIUM SERPL-SCNC: 138 MEQ/L (ref 135–145)
WBC # BLD AUTO: 9.9 THOU/MM3 (ref 4.8–10.8)

## 2025-04-14 PROCEDURE — 7100000011 HC PHASE II RECOVERY - ADDTL 15 MIN: Performed by: INTERNAL MEDICINE

## 2025-04-14 PROCEDURE — 86900 BLOOD TYPING SEROLOGIC ABO: CPT

## 2025-04-14 PROCEDURE — 6370000000 HC RX 637 (ALT 250 FOR IP): Performed by: STUDENT IN AN ORGANIZED HEALTH CARE EDUCATION/TRAINING PROGRAM

## 2025-04-14 PROCEDURE — 85027 COMPLETE CBC AUTOMATED: CPT

## 2025-04-14 PROCEDURE — 99153 MOD SED SAME PHYS/QHP EA: CPT | Performed by: INTERNAL MEDICINE

## 2025-04-14 PROCEDURE — 93458 L HRT ARTERY/VENTRICLE ANGIO: CPT | Performed by: INTERNAL MEDICINE

## 2025-04-14 PROCEDURE — 2580000003 HC RX 258: Performed by: STUDENT IN AN ORGANIZED HEALTH CARE EDUCATION/TRAINING PROGRAM

## 2025-04-14 PROCEDURE — 6360000004 HC RX CONTRAST MEDICATION: Performed by: INTERNAL MEDICINE

## 2025-04-14 PROCEDURE — 85730 THROMBOPLASTIN TIME PARTIAL: CPT

## 2025-04-14 PROCEDURE — 80048 BASIC METABOLIC PNL TOTAL CA: CPT

## 2025-04-14 PROCEDURE — 2709999900 HC NON-CHARGEABLE SUPPLY: Performed by: INTERNAL MEDICINE

## 2025-04-14 PROCEDURE — 6360000002 HC RX W HCPCS: Performed by: INTERNAL MEDICINE

## 2025-04-14 PROCEDURE — C1894 INTRO/SHEATH, NON-LASER: HCPCS | Performed by: INTERNAL MEDICINE

## 2025-04-14 PROCEDURE — 93005 ELECTROCARDIOGRAM TRACING: CPT | Performed by: STUDENT IN AN ORGANIZED HEALTH CARE EDUCATION/TRAINING PROGRAM

## 2025-04-14 PROCEDURE — 85610 PROTHROMBIN TIME: CPT

## 2025-04-14 PROCEDURE — 36415 COLL VENOUS BLD VENIPUNCTURE: CPT

## 2025-04-14 PROCEDURE — 7100000010 HC PHASE II RECOVERY - FIRST 15 MIN: Performed by: INTERNAL MEDICINE

## 2025-04-14 PROCEDURE — 86901 BLOOD TYPING SEROLOGIC RH(D): CPT

## 2025-04-14 PROCEDURE — 2500000003 HC RX 250 WO HCPCS: Performed by: INTERNAL MEDICINE

## 2025-04-14 PROCEDURE — 99152 MOD SED SAME PHYS/QHP 5/>YRS: CPT | Performed by: INTERNAL MEDICINE

## 2025-04-14 PROCEDURE — C1769 GUIDE WIRE: HCPCS | Performed by: INTERNAL MEDICINE

## 2025-04-14 PROCEDURE — 86885 COOMBS TEST INDIRECT QUAL: CPT

## 2025-04-14 RX ORDER — ASPIRIN 81 MG/1
81 TABLET ORAL DAILY
Qty: 90 TABLET | Refills: 0 | Status: SHIPPED | OUTPATIENT
Start: 2025-04-14

## 2025-04-14 RX ORDER — IOPAMIDOL 755 MG/ML
INJECTION, SOLUTION INTRAVASCULAR PRN
Status: DISCONTINUED | OUTPATIENT
Start: 2025-04-14 | End: 2025-04-14 | Stop reason: HOSPADM

## 2025-04-14 RX ORDER — HYDROCORTISONE SODIUM SUCCINATE 100 MG/2ML
200 INJECTION INTRAMUSCULAR; INTRAVENOUS ONCE
Status: DISCONTINUED | OUTPATIENT
Start: 2025-04-14 | End: 2025-04-14

## 2025-04-14 RX ORDER — SODIUM CHLORIDE 9 MG/ML
INJECTION, SOLUTION INTRAVENOUS PRN
Status: DISCONTINUED | OUTPATIENT
Start: 2025-04-14 | End: 2025-04-14 | Stop reason: HOSPADM

## 2025-04-14 RX ORDER — SODIUM CHLORIDE 9 MG/ML
INJECTION, SOLUTION INTRAVENOUS CONTINUOUS
Status: DISCONTINUED | OUTPATIENT
Start: 2025-04-14 | End: 2025-04-14 | Stop reason: HOSPADM

## 2025-04-14 RX ORDER — NITROGLYCERIN 0.4 MG/1
0.4 TABLET SUBLINGUAL EVERY 5 MIN PRN
Status: DISCONTINUED | OUTPATIENT
Start: 2025-04-14 | End: 2025-04-14 | Stop reason: HOSPADM

## 2025-04-14 RX ORDER — ACETAMINOPHEN 325 MG/1
650 TABLET ORAL EVERY 4 HOURS PRN
Status: DISCONTINUED | OUTPATIENT
Start: 2025-04-14 | End: 2025-04-14 | Stop reason: HOSPADM

## 2025-04-14 RX ORDER — SODIUM CHLORIDE 0.9 % (FLUSH) 0.9 %
5-40 SYRINGE (ML) INJECTION PRN
Status: DISCONTINUED | OUTPATIENT
Start: 2025-04-14 | End: 2025-04-14 | Stop reason: HOSPADM

## 2025-04-14 RX ORDER — DIPHENHYDRAMINE HYDROCHLORIDE 50 MG/ML
50 INJECTION, SOLUTION INTRAMUSCULAR; INTRAVENOUS ONCE
Status: DISCONTINUED | OUTPATIENT
Start: 2025-04-14 | End: 2025-04-14

## 2025-04-14 RX ORDER — SODIUM CHLORIDE 0.9 % (FLUSH) 0.9 %
5-40 SYRINGE (ML) INJECTION EVERY 12 HOURS SCHEDULED
Status: DISCONTINUED | OUTPATIENT
Start: 2025-04-14 | End: 2025-04-14 | Stop reason: HOSPADM

## 2025-04-14 RX ORDER — METOPROLOL TARTRATE 25 MG/1
12.5 TABLET, FILM COATED ORAL 2 TIMES DAILY
Qty: 30 TABLET | Refills: 3 | Status: SHIPPED | OUTPATIENT
Start: 2025-04-14

## 2025-04-14 RX ORDER — ASPIRIN 325 MG
325 TABLET ORAL ONCE
Status: COMPLETED | OUTPATIENT
Start: 2025-04-14 | End: 2025-04-14

## 2025-04-14 RX ORDER — MIDAZOLAM HYDROCHLORIDE 1 MG/ML
INJECTION, SOLUTION INTRAMUSCULAR; INTRAVENOUS PRN
Status: DISCONTINUED | OUTPATIENT
Start: 2025-04-14 | End: 2025-04-14 | Stop reason: HOSPADM

## 2025-04-14 RX ORDER — ATROPINE SULFATE 0.4 MG/ML
0.5 INJECTION, SOLUTION INTRAVENOUS
Status: DISCONTINUED | OUTPATIENT
Start: 2025-04-14 | End: 2025-04-14 | Stop reason: HOSPADM

## 2025-04-14 RX ORDER — ROSUVASTATIN CALCIUM 20 MG/1
20 TABLET, COATED ORAL DAILY
Qty: 30 TABLET | Refills: 3 | Status: SHIPPED | OUTPATIENT
Start: 2025-04-14

## 2025-04-14 RX ORDER — FENTANYL CITRATE 50 UG/ML
INJECTION, SOLUTION INTRAMUSCULAR; INTRAVENOUS PRN
Status: DISCONTINUED | OUTPATIENT
Start: 2025-04-14 | End: 2025-04-14 | Stop reason: HOSPADM

## 2025-04-14 RX ADMIN — SODIUM CHLORIDE: 0.9 INJECTION, SOLUTION INTRAVENOUS at 06:43

## 2025-04-14 RX ADMIN — ASPIRIN 325 MG: 325 TABLET ORAL at 07:04

## 2025-04-14 NOTE — DISCHARGE INSTRUCTIONS
--fenofibrate, gemfibrozil.  This list is not complete. Other drugs may affect rosuvastatin, including prescription and over-the-counter medicines, vitamins, and herbal products. Not all possible drug interactions are listed here.  Where can I get more information?  Your pharmacist can provide more information about rosuvastatin.  Remember, keep this and all other medicines out of the reach of children, never share your medicines with others, and use this medication only for the indication prescribed.   Every effort has been made to ensure that the information provided by [employer]') is accurate, up-to-date, and complete, but no guarantee is made to that effect. Drug information contained herein may be time sensitive. [employer] information has been compiled for use by healthcare practitioners and consumers in the [place] and therefore [employer] does not warrant that uses outside of the [place] are appropriate, unless specifically indicated otherwise. [employer]'s drug information does not endorse drugs, diagnose patients or recommend therapy. [employer] drug information is an informational resource designed to assist licensed healthcare practitioners in caring for their patients and/or to serve consumers viewing this service as a supplement to, and not a substitute for, the expertise, skill, knowledge and judgment of healthcare practitioners. The absence of a warning for a given drug or drug combination in no way should be construed to indicate that the drug or drug combination is safe, effective or appropriate for any given patient. [employer] does not assume any responsibility for any aspect of healthcare administered with the aid of information [employer] provides. The information contained herein is not intended to cover all possible uses, directions, precautions, warnings, drug interactions, allergic reactions, or adverse effects. If you have questions about the drugs you are taking, check with your

## 2025-04-14 NOTE — H&P
[facility]  Sedation/Analgesia History & Physical    Pt Name: Jaron Hernandez  Account number: [id number]  MRN: [id number]  YOB: 1950  Provider Performing Procedure: Gaby Peña MD MD  Referring Provider: Gaby Peña MD   Primary Care Physician: Romana Yoder MD  Date: 4/14/2025    PRE-PROCEDURE    Code Status: FULL CODE  Brief History/Pre-Procedure Diagnosis:   Angina  Coronary Artery Disease: Calcium score 2367   Abnormal stress test  Consent: : I have discussed with the patient risks, benefits, and alternatives (and relevant risks, benefits, and side effects related to alternatives or not receiving care), and likelihood of the success.   The patient and/or representative appear to understand and agree to proceed.  The discussion encompasses risks, benefits, and side effects related to the alternatives and the risks related to not receiving the proposed care, treatment, and services.     The indication, risks and benefits of the procedure and possible therapeutic consequences and alternatives were discussed with the patient. The patient was given the opportunity to ask questions and to have them answered to his/her satisfaction. Risks of the procedure include but are not limited to the following: Bleeding, hematoma including retroperitoneal hemmorhage, infection, pain and discomfort, injury to the aorta and other blood vessels, rhythm disturbance, low blood pressure, myocardial infarction, stroke, kidney damage/failure, myocardial perforation, allergic reactions to sedatives/contrast material, loss of pulse/vascular compromise requiring surgery, aneurysm/pseudoaneurysm formation, possible loss of a limb/hand/leg, needing blood transfusion, requiring emergent open heart surgery or emergent coronary intervention, the need for intubation/respiratory support, the requirement for defibrillation/cardioversion, and death. Alternatives to and omission of the suggested procedure

## 2025-04-14 NOTE — PROGRESS NOTES
1409 care taken over from Laurie Rn, site with vascband to rt radial site,  dry and intact.      Bandaid applied to site at 1430 and vascband removed, armboard continued, site stable.   Patient instructed not to bend, twist, lift, push or pull with right wrist, voices understanding.       1430Discharge instructions given, voices understanding.     1415 Up in room, activity tolerated well.     1525 Discharged per wheelchair, stable condition.

## 2025-04-14 NOTE — PROGRESS NOTES
0600  Pt admitted to  2E03 ambulatory for prehydration / left heart cath.  Pt NPO. Patient accompanied by spouse, Zaida.   Vital signs obtained.  Assessment and data collection initiated.   Oriented to room.   Policies and procedures for 2E explained   All questions answered with no further questions at this time.   Fall prevention and safety precautions discussed with patient.   0825 Dr Peña on unit, results of blood work reviewed   Orders received to increase IV rate    1130  to procedure per bed

## 2025-04-15 DIAGNOSIS — F51.01 PRIMARY INSOMNIA: ICD-10-CM

## 2025-04-15 RX ORDER — DOXEPIN HYDROCHLORIDE 25 MG/1
CAPSULE ORAL
Qty: 180 CAPSULE | Refills: 0 | Status: SHIPPED | OUTPATIENT
Start: 2025-04-15

## 2025-04-15 NOTE — TELEPHONE ENCOUNTER
This medication refill is regarding a electronic request. Refill requested by  [facility] .    Requested Prescriptions     Pending Prescriptions Disp Refills    doxepin (SINEQUAN) 25 MG capsule [Pharmacy Med Name: Doxepin HCl 25 MG Oral Capsule] 180 capsule 0     Sig: TAKE 1 TO 2 CAPSULES BY MOUTH AT BEDTIME AS NEEDED FOR INSOMNIA       Date of last visit: 12/13/2024   Date of next visit: 4/24/2025  Date of last refill: 10/18/2024  180/1    Last Lipid Panel:    Lab Results   Component Value Date/Time    CHOL 197 04/10/2024 11:19 AM    TRIG 269 04/10/2024 11:19 AM    HDL 53 04/10/2024 11:19 AM     Last CMP:   Lab Results   Component Value Date     04/14/2025    K 3.9 04/14/2025     04/14/2025    CO2 24 04/14/2025    BUN 36 (H) 04/14/2025    CREATININE 1.6 (H) 04/14/2025    GLUCOSE 110 (H) 04/14/2025    CALCIUM 9.6 04/14/2025    BILITOT 0.7 04/10/2024    ALKPHOS 54 04/10/2024    AST 26 04/10/2024    ALT 29 04/10/2024    LABGLOM 45 (A) 04/14/2025       Last Thyroid:    Lab Results   Component Value Date    TSH 1.070 04/10/2024    T4FREE 1.58 04/11/2023     Last Hemoglobin A1C:    Lab Results   Component Value Date/Time    LABA1C 6.6 10/18/2024 10:42 AM       Rx verified, ordered and set to EP.

## 2025-04-21 SDOH — HEALTH STABILITY: PHYSICAL HEALTH: ON AVERAGE, HOW MANY MINUTES DO YOU ENGAGE IN EXERCISE AT THIS LEVEL?: 40 MIN

## 2025-04-21 SDOH — ECONOMIC STABILITY: FOOD INSECURITY: WITHIN THE PAST 12 MONTHS, THE FOOD YOU BOUGHT JUST DIDN'T LAST AND YOU DIDN'T HAVE MONEY TO GET MORE.: NEVER TRUE

## 2025-04-21 SDOH — ECONOMIC STABILITY: FOOD INSECURITY: WITHIN THE PAST 12 MONTHS, YOU WORRIED THAT YOUR FOOD WOULD RUN OUT BEFORE YOU GOT MONEY TO BUY MORE.: NEVER TRUE

## 2025-04-21 SDOH — ECONOMIC STABILITY: INCOME INSECURITY: IN THE LAST 12 MONTHS, WAS THERE A TIME WHEN YOU WERE NOT ABLE TO PAY THE MORTGAGE OR RENT ON TIME?: NO

## 2025-04-21 SDOH — HEALTH STABILITY: PHYSICAL HEALTH: ON AVERAGE, HOW MANY DAYS PER WEEK DO YOU ENGAGE IN MODERATE TO STRENUOUS EXERCISE (LIKE A BRISK WALK)?: 3 DAYS

## 2025-04-21 ASSESSMENT — PATIENT HEALTH QUESTIONNAIRE - PHQ9
2. FEELING DOWN, DEPRESSED OR HOPELESS: NOT AT ALL
SUM OF ALL RESPONSES TO PHQ QUESTIONS 1-9: 0
1. LITTLE INTEREST OR PLEASURE IN DOING THINGS: NOT AT ALL
SUM OF ALL RESPONSES TO PHQ QUESTIONS 1-9: 0

## 2025-04-21 ASSESSMENT — LIFESTYLE VARIABLES
HOW OFTEN DURING THE LAST YEAR HAVE YOU FOUND THAT YOU WERE NOT ABLE TO STOP DRINKING ONCE YOU HAD STARTED: NEVER
HOW OFTEN DURING THE LAST YEAR HAVE YOU HAD A FEELING OF GUILT OR REMORSE AFTER DRINKING: NEVER
HOW OFTEN DURING THE LAST YEAR HAVE YOU FOUND THAT YOU WERE NOT ABLE TO STOP DRINKING ONCE YOU HAD STARTED: NEVER
HOW OFTEN DURING THE LAST YEAR HAVE YOU HAD A FEELING OF GUILT OR REMORSE AFTER DRINKING: NEVER
HOW OFTEN DURING THE LAST YEAR HAVE YOU FAILED TO DO WHAT WAS NORMALLY EXPECTED FROM YOU BECAUSE OF DRINKING: NEVER
HOW MANY STANDARD DRINKS CONTAINING ALCOHOL DO YOU HAVE ON A TYPICAL DAY: 2
HOW OFTEN DURING THE LAST YEAR HAVE YOU FAILED TO DO WHAT WAS NORMALLY EXPECTED FROM YOU BECAUSE OF DRINKING: NEVER
HAS A RELATIVE, FRIEND, DOCTOR, OR ANOTHER HEALTH PROFESSIONAL EXPRESSED CONCERN ABOUT YOUR DRINKING OR SUGGESTED YOU CUT DOWN: NO
HAVE YOU OR SOMEONE ELSE BEEN INJURED AS A RESULT OF YOUR DRINKING: NO
HOW MANY STANDARD DRINKS CONTAINING ALCOHOL DO YOU HAVE ON A TYPICAL DAY: 3 OR 4
HOW OFTEN DURING THE LAST YEAR HAVE YOU BEEN UNABLE TO REMEMBER WHAT HAPPENED THE NIGHT BEFORE BECAUSE YOU HAD BEEN DRINKING: NEVER
HOW OFTEN DURING THE LAST YEAR HAVE YOU BEEN UNABLE TO REMEMBER WHAT HAPPENED THE NIGHT BEFORE BECAUSE YOU HAD BEEN DRINKING: NEVER
HOW OFTEN DO YOU HAVE A DRINK CONTAINING ALCOHOL: MONTHLY OR LESS
HAVE YOU OR SOMEONE ELSE BEEN INJURED AS A RESULT OF YOUR DRINKING: NO
HOW OFTEN DURING THE LAST YEAR HAVE YOU NEEDED AN ALCOHOLIC DRINK FIRST THING IN THE MORNING TO GET YOURSELF GOING AFTER A NIGHT OF HEAVY DRINKING: NEVER
HOW OFTEN DO YOU HAVE SIX OR MORE DRINKS ON ONE OCCASION: 1
HAS A RELATIVE, FRIEND, DOCTOR, OR ANOTHER HEALTH PROFESSIONAL EXPRESSED CONCERN ABOUT YOUR DRINKING OR SUGGESTED YOU CUT DOWN: NO
HOW OFTEN DO YOU HAVE A DRINK CONTAINING ALCOHOL: 2
HOW OFTEN DURING THE LAST YEAR HAVE YOU NEEDED AN ALCOHOLIC DRINK FIRST THING IN THE MORNING TO GET YOURSELF GOING AFTER A NIGHT OF HEAVY DRINKING: NEVER

## 2025-04-24 ENCOUNTER — RESULTS FOLLOW-UP (OUTPATIENT)
Dept: FAMILY MEDICINE CLINIC | Age: 75
End: 2025-04-24

## 2025-04-24 ENCOUNTER — HOSPITAL ENCOUNTER (OUTPATIENT)
Dept: GENERAL RADIOLOGY | Age: 75
Discharge: HOME | End: 2025-04-24
Payer: MEDICARE

## 2025-04-24 ENCOUNTER — OFFICE VISIT (OUTPATIENT)
Dept: FAMILY MEDICINE CLINIC | Age: 75
End: 2025-04-24

## 2025-04-24 ENCOUNTER — HOSPITAL ENCOUNTER (OUTPATIENT)
Age: 75
Discharge: HOME | End: 2025-04-24
Payer: MEDICARE

## 2025-04-24 VITALS
HEART RATE: 86 BPM | WEIGHT: 196 LBS | HEIGHT: 72 IN | DIASTOLIC BLOOD PRESSURE: 50 MMHG | SYSTOLIC BLOOD PRESSURE: 100 MMHG | OXYGEN SATURATION: 95 % | BODY MASS INDEX: 26.55 KG/M2

## 2025-04-24 DIAGNOSIS — I10 ESSENTIAL HYPERTENSION: ICD-10-CM

## 2025-04-24 DIAGNOSIS — M54.2 NECK PAIN: ICD-10-CM

## 2025-04-24 DIAGNOSIS — E11.9 TYPE 2 DIABETES MELLITUS WITHOUT COMPLICATION, WITHOUT LONG-TERM CURRENT USE OF INSULIN (HCC): ICD-10-CM

## 2025-04-24 DIAGNOSIS — R10.12 INTERMITTENT LEFT UPPER QUADRANT ABDOMINAL PAIN: ICD-10-CM

## 2025-04-24 DIAGNOSIS — Z00.00 MEDICARE ANNUAL WELLNESS VISIT, SUBSEQUENT: Primary | ICD-10-CM

## 2025-04-24 DIAGNOSIS — M25.551 RIGHT HIP PAIN: ICD-10-CM

## 2025-04-24 PROCEDURE — 73502 X-RAY EXAM HIP UNI 2-3 VIEWS: CPT

## 2025-04-24 PROCEDURE — 72040 X-RAY EXAM NECK SPINE 2-3 VW: CPT

## 2025-04-24 RX ORDER — LISINOPRIL AND HYDROCHLOROTHIAZIDE 20; 25 MG/1; MG/1
1 TABLET ORAL DAILY
Qty: 90 TABLET | Refills: 3
Start: 2025-04-24

## 2025-04-24 RX ORDER — LORAZEPAM 0.5 MG/1
0.5 TABLET ORAL 2 TIMES DAILY
COMMUNITY

## 2025-04-24 NOTE — PROGRESS NOTES
Medicare Annual Wellness Visit    Jaron Hernandez is here for Medicare AWV (Pt in ER a 4/14- had heart cath. Pain on left  side abd/ recurring-now steady. Neck is weak and sore. Wants to be sure pain is from old age, rather than something being wrong. And pt has a spot on left leg/ will dermatologist, next Wednesday 4/30. )    Assessment & Plan   Medicare annual wellness visit, subsequent  Neck pain  -     XR CERVICAL SPINE (2-3 VIEWS); Future  Type 2 diabetes mellitus without complication, without long-term current use of insulin (HCC)  -     XR CERVICAL SPINE (2-3 VIEWS); Future  -     Comprehensive Metabolic Panel; Future  -     CBC with Auto Differential; Future  -     Hemoglobin A1C; Future  -     Magnesium; Future  -     Albumin/Creatinine Ratio, Urine; Future  -     TSH reflex to FT4; Future  -     Vitamin B12; Future  -     lisinopril-hydroCHLOROthiazide (PRINZIDE;ZESTORETIC) 20-25 MG per tablet; Take 1 tablet by mouth daily, Disp-90 tablet, R-3NO PRINT  -     Lipid Panel; Future  Essential hypertension  -     XR CERVICAL SPINE (2-3 VIEWS); Future  -     Comprehensive Metabolic Panel; Future  -     CBC with Auto Differential; Future  -     Hemoglobin A1C; Future  -     Magnesium; Future  -     Albumin/Creatinine Ratio, Urine; Future  -     TSH reflex to FT4; Future  -     Vitamin B12; Future  -     lisinopril-hydroCHLOROthiazide (PRINZIDE;ZESTORETIC) 20-25 MG per tablet; Take 1 tablet by mouth daily, Disp-90 tablet, R-3NO PRINT  -     Lipid Panel; Future  Right hip pain  -     XR HIP RIGHT (2-3 VIEWS); Future     BP low -- will decrease lisinopril-hctz to 1 tablet daily, until he sees cardiolgoy   Neck stretches. xrays    Also core stretches for left sided pain. Appears MSK.  May consider chiropractic intervention.    Right hip xray with ongoing pain, appear more muscular with a back component. Stretches may help.     Recommend seeing ortho if not improved    Return in about 6 months (around 10/24/2025).

## 2025-04-24 NOTE — PATIENT INSTRUCTIONS
your healthcare professional. [employer], disclaims any warranty or liability for your use of this information.         A Healthy Heart: Care Instructions  Overview     Coronary artery disease, also called heart disease, occurs when a substance called plaque builds up in the vessels that supply oxygen-rich blood to your heart muscle. This can narrow the blood vessels and reduce blood flow. A heart attack happens when blood flow is completely blocked. A high-fat diet, smoking, and other factors increase the risk of heart disease.  Your doctor has found that you have a chance of having heart disease. A heart-healthy lifestyle can help keep your heart healthy and prevent heart disease. This lifestyle includes eating healthy, being active, staying at a weight that's healthy for you, and not smoking or using tobacco. It also includes taking medicines as directed, managing other health conditions, and trying to get a healthy amount of sleep.  Follow-up care is a key part of your treatment and safety. Be sure to make and go to all appointments, and call your doctor if you are having problems. It's also a good idea to know your test results and keep a list of the medicines you take.  How can you care for yourself at home?  Diet    Use less salt when you cook and eat. This helps lower your blood pressure. Taste food before salting. Add only a little salt when you think you need it. With time, your taste buds will adjust to less salt.     Eat fewer snack items, fast foods, canned soups, and other high-salt, high-fat, processed foods.     Read food labels and try to avoid saturated and trans fats. They increase your risk of heart disease by raising cholesterol levels.     Limit the amount of solid fat--butter, margarine, and shortening--you eat. Use olive, peanut, or canola oil when you cook. Bake, broil, and steam foods instead of frying them.     Eat a variety of fruit and vegetables every day. Dark green, deep

## 2025-04-27 PROBLEM — R10.12 INTERMITTENT LEFT UPPER QUADRANT ABDOMINAL PAIN: Status: ACTIVE | Noted: 2025-04-27

## 2025-04-29 DIAGNOSIS — F41.9 ANXIETY DISORDER, UNSPECIFIED: ICD-10-CM

## 2025-04-29 RX ORDER — LORAZEPAM 0.5 MG/1
0.5 TABLET ORAL 2 TIMES DAILY
Qty: 30 TABLET | Refills: 0 | Status: SHIPPED | OUTPATIENT
Start: 2025-04-29 | End: 2025-07-28

## 2025-04-29 NOTE — TELEPHONE ENCOUNTER
Jaron Hernandez called requesting a refill on the following medications:  Requested Prescriptions     Pending Prescriptions Disp Refills    LORazepam (ATIVAN) 0.5 MG tablet [Pharmacy Med Name: LORazepam 0.5 MG Oral Tablet] 30 tablet 0     Sig: Take 1 tablet by mouth 2 times daily as needed.       Date of last visit: 4/24/2025  Date of next visit (if applicable):10/24/2025  Date of last refill: 10/18/2024  Pharmacy Name: Randee Guillen MA

## 2025-05-02 ENCOUNTER — TELEPHONE (OUTPATIENT)
Dept: CARDIOLOGY CLINIC | Age: 75
End: 2025-05-02

## 2025-05-02 NOTE — TELEPHONE ENCOUNTER
Jaron Hernandez \"Blu\" to [facility] Clinical Staff (supporting Gaby Peña MD) (Selected Message)        5/2/25  1:12 PM  ok, thanks…also… heart rate high (80-90bpm)

## 2025-05-02 NOTE — TELEPHONE ENCOUNTER
Please see My Chart Message:    Jaron Hernandez \"Blu\" to [facility] Clinical Staff (supporting Gaby Peña MD) (Selected Message)        5/2/25  1:03 PM  my blood pressure has been low (/ 50-65) and have been getting lightheaded..it  started after starting my new medication after heart cath..    porting Gaby Peña MD)        5/2/25  1:07 PM  I have an appt. on 5/14. Roseann

## 2025-05-05 NOTE — TELEPHONE ENCOUNTER
Jaron Hernandez \"Blu\" to [facility] (supporting Gaby Peña MD)        5/5/25 11:45 AM  I will split my lisinopril 20-25 mg daily tabs and begin taking that lower dosage tomorrow.. Also, there is no blood in my stool or visible in my urine.   My lab preference is which ever lab Dr. Yoder sends my prior samples to.  I have a nearly full supply of lisinopril 20-25 mg that I can split.  We can discuss  anything further at my next appt.  May 14th.  Pharmacy used is Ronni Hernandez \"Blu\" to [facility] (supporting Gaby Peña MD)        5/5/25 11:49 AM  My current Bp is 122/61 and Hr is 81  Treva Jimenez, NAZARIO to Jaron Hernandez \"Blu\"  NS      5/5/25 12:52 PM  Kenrick Hurt   We are not sure what labs she uses, we can give you the slip at your appt.

## 2025-05-05 NOTE — TELEPHONE ENCOUNTER
Need to lower dose of Lisinopril-Hydrochlorothiazide to 10 mg/12.5 mg po daily   Has CKD, Cr was 1.6  Repeat BMP in one week   Has h/o A Fib  Inquire about bleeding complaints (Ex. Blood in stool)  Cont lopressor and Cardizem  Also, patient needs to monitor home BP/HR  If symptoms, may need further adjustment in meds and/or Holter monitor

## 2025-05-05 NOTE — TELEPHONE ENCOUNTER
[employer] message sent to patient.  Need to know pharmacy and lab?  Also if patient is having any blooding?

## 2025-05-09 NOTE — PROGRESS NOTES
[facility] [place] [facility]  [facility] CARDIOLOG[facility]  [address]  [address]  [place] [address]  Dept: [phone]  Dept Fax: [phone]  Loc: [phone]    Visit Date: 2025    Jaron Hernandez is a 75 y.o. male who presents todayfor:  Chief Complaint   Patient presents with    Follow-Up from Hospital     S/p heart cath     Cardiologist: Mariana    Hx of smoking, HTN, atrial flutter, HLD    HPI: f/u, recent cath with non obs CAD  Having more dizzy episodes with lisinoprili-hctz 20-25. He has been feeling well otherwise. No afib issues he has noticed. HR does run a little higher at baseline. 90s today. Still smoking marijuana for pain control.   Past Surgical History:   Procedure Laterality Date    CARDIAC PROCEDURE N/A 2025    Left heart cath / coronary angiography performed by Gaby Peña MD at [facility] CARDIAC CATH LAB    COLONOSCOPY      JOINT REPLACEMENT      right hip    MO SCROTAL EXPLORATION Left 12/3/2020    LEFT TESTICLE  EXPLORATION AND WASH OUT performed by Shelton Murray MD at [facility] OR    PROSTATE SURGERY      brachytherapy    TONSILLECTOMY      TOTAL HIP ARTHROPLASTY      right     Family History   Problem Relation Age of Onset    Cancer Mother 83        Pancreas,  CHF, DM    Cancer Father 83        Lung cancer, COPD    Prostate Cancer Neg Hx      Social History     Tobacco Use    Smoking status: Former     Current packs/day: 0.00     Average packs/day: 0.5 packs/day for 37.0 years (18.5 ttl pk-yrs)     Types: Cigarettes     Start date: 1980     Quit date: 2017     Years since quittin.3    Smokeless tobacco: Never    Tobacco comments:     10 cigarettes per day   Substance Use Topics    Alcohol use: Yes     Comment: occasionally      Current Outpatient Medications   Medication Sig Dispense Refill    metoprolol tartrate (LOPRESSOR) 25 MG tablet Take 0.5 tablets by mouth 2 times daily 30 tablet 3    rosuvastatin (CRESTOR) 20 MG tablet Take 1 tablet by

## 2025-05-14 ENCOUNTER — OFFICE VISIT (OUTPATIENT)
Dept: CARDIOLOGY CLINIC | Age: 75
End: 2025-05-14
Payer: MEDICARE

## 2025-05-14 VITALS
SYSTOLIC BLOOD PRESSURE: 141 MMHG | HEIGHT: 72 IN | BODY MASS INDEX: 26.55 KG/M2 | WEIGHT: 196 LBS | HEART RATE: 99 BPM | DIASTOLIC BLOOD PRESSURE: 82 MMHG

## 2025-05-14 DIAGNOSIS — N18.31 CHRONIC KIDNEY DISEASE, STAGE 3A (HCC): ICD-10-CM

## 2025-05-14 DIAGNOSIS — Z71.6 TOBACCO ABUSE COUNSELING: ICD-10-CM

## 2025-05-14 DIAGNOSIS — I10 ESSENTIAL HYPERTENSION: ICD-10-CM

## 2025-05-14 DIAGNOSIS — I48.0 PAF (PAROXYSMAL ATRIAL FIBRILLATION) (HCC): ICD-10-CM

## 2025-05-14 DIAGNOSIS — I25.10 CORONARY ARTERY DISEASE INVOLVING NATIVE CORONARY ARTERY OF NATIVE HEART WITHOUT ANGINA PECTORIS: Primary | ICD-10-CM

## 2025-05-14 PROBLEM — E11.22 TYPE 2 DIABETES MELLITUS WITH CHRONIC KIDNEY DISEASE (HCC): Status: ACTIVE | Noted: 2025-05-14

## 2025-05-14 PROCEDURE — 3077F SYST BP >= 140 MM HG: CPT | Performed by: STUDENT IN AN ORGANIZED HEALTH CARE EDUCATION/TRAINING PROGRAM

## 2025-05-14 PROCEDURE — 1036F TOBACCO NON-USER: CPT | Performed by: STUDENT IN AN ORGANIZED HEALTH CARE EDUCATION/TRAINING PROGRAM

## 2025-05-14 PROCEDURE — 99214 OFFICE O/P EST MOD 30 MIN: CPT | Performed by: STUDENT IN AN ORGANIZED HEALTH CARE EDUCATION/TRAINING PROGRAM

## 2025-05-14 PROCEDURE — 1159F MED LIST DOCD IN RCRD: CPT | Performed by: STUDENT IN AN ORGANIZED HEALTH CARE EDUCATION/TRAINING PROGRAM

## 2025-05-14 PROCEDURE — 3017F COLORECTAL CA SCREEN DOC REV: CPT | Performed by: STUDENT IN AN ORGANIZED HEALTH CARE EDUCATION/TRAINING PROGRAM

## 2025-05-14 PROCEDURE — G8417 CALC BMI ABV UP PARAM F/U: HCPCS | Performed by: STUDENT IN AN ORGANIZED HEALTH CARE EDUCATION/TRAINING PROGRAM

## 2025-05-14 PROCEDURE — 3079F DIAST BP 80-89 MM HG: CPT | Performed by: STUDENT IN AN ORGANIZED HEALTH CARE EDUCATION/TRAINING PROGRAM

## 2025-05-14 PROCEDURE — 1123F ACP DISCUSS/DSCN MKR DOCD: CPT | Performed by: STUDENT IN AN ORGANIZED HEALTH CARE EDUCATION/TRAINING PROGRAM

## 2025-05-14 PROCEDURE — G8427 DOCREV CUR MEDS BY ELIG CLIN: HCPCS | Performed by: STUDENT IN AN ORGANIZED HEALTH CARE EDUCATION/TRAINING PROGRAM

## 2025-05-14 RX ORDER — LISINOPRIL AND HYDROCHLOROTHIAZIDE 20; 25 MG/1; MG/1
1 TABLET ORAL DAILY
Qty: 90 TABLET | Refills: 3 | Status: CANCELLED | OUTPATIENT
Start: 2025-05-14

## 2025-05-14 RX ORDER — LISINOPRIL AND HYDROCHLOROTHIAZIDE 10; 12.5 MG/1; MG/1
1 TABLET ORAL DAILY
Qty: 30 TABLET | Refills: 3 | Status: SHIPPED | OUTPATIENT
Start: 2025-05-14

## 2025-05-14 RX ORDER — METOPROLOL TARTRATE 25 MG/1
12.5 TABLET, FILM COATED ORAL 2 TIMES DAILY
Qty: 30 TABLET | Refills: 3 | Status: SHIPPED | OUTPATIENT
Start: 2025-05-14

## 2025-05-14 RX ORDER — ROSUVASTATIN CALCIUM 20 MG/1
20 TABLET, COATED ORAL DAILY
Qty: 30 TABLET | Refills: 3 | Status: SHIPPED | OUTPATIENT
Start: 2025-05-14

## 2025-05-20 DIAGNOSIS — I48.0 PAROXYSMAL A-FIB (HCC): ICD-10-CM

## 2025-05-20 RX ORDER — RIVAROXABAN 20 MG/1
20 TABLET, FILM COATED ORAL
Qty: 90 TABLET | Refills: 3 | Status: SHIPPED | OUTPATIENT
Start: 2025-05-20

## 2025-05-20 NOTE — TELEPHONE ENCOUNTER
Jaron Hernandez called requesting a refill on the following medications:  Requested Prescriptions     Pending Prescriptions Disp Refills    XARELTO 20 MG TABS tablet [Pharmacy Med Name: Xarelto 20 MG Oral Tablet] 30 tablet 0     Sig: Take 1 tablet by mouth once daily with breakfast       Date of last visit: 4/24/2025  Date of next visit (if applicable):10/24/2025  Date of last refill: 04/17/2024  Pharmacy Name: [facility], [place], [place].      Thanks,  TALI FRAIRE LPN

## 2025-05-29 ENCOUNTER — RESULTS FOLLOW-UP (OUTPATIENT)
Dept: CARDIOLOGY CLINIC | Age: 75
End: 2025-05-29

## 2025-05-29 ENCOUNTER — TELEPHONE (OUTPATIENT)
Dept: CARDIOLOGY CLINIC | Age: 75
End: 2025-05-29

## 2025-05-29 ENCOUNTER — HOSPITAL ENCOUNTER (OUTPATIENT)
Age: 75
Discharge: HOME | End: 2025-05-29
Payer: MEDICARE

## 2025-05-29 DIAGNOSIS — I25.10 CORONARY ARTERY DISEASE INVOLVING NATIVE CORONARY ARTERY OF NATIVE HEART WITHOUT ANGINA PECTORIS: ICD-10-CM

## 2025-05-29 LAB
ANION GAP SERPL CALC-SCNC: 10 MEQ/L (ref 8–16)
BUN SERPL-MCNC: 33 MG/DL (ref 8–23)
CALCIUM SERPL-MCNC: 9.6 MG/DL (ref 8.8–10.2)
CHLORIDE SERPL-SCNC: 101 MEQ/L (ref 98–111)
CO2 SERPL-SCNC: 26 MEQ/L (ref 22–29)
CREAT SERPL-MCNC: 1.4 MG/DL (ref 0.7–1.2)
GFR SERPL CREATININE-BSD FRML MDRD: 53 ML/MIN/1.73M2
GLUCOSE SERPL-MCNC: 116 MG/DL (ref 74–109)
POTASSIUM SERPL-SCNC: 4.5 MEQ/L (ref 3.5–5.2)
SODIUM SERPL-SCNC: 137 MEQ/L (ref 135–145)

## 2025-05-29 PROCEDURE — 80048 BASIC METABOLIC PNL TOTAL CA: CPT

## 2025-05-29 PROCEDURE — 36415 COLL VENOUS BLD VENIPUNCTURE: CPT

## 2025-05-29 NOTE — TELEPHONE ENCOUNTER
Mortimer, Connor, PA-C  [facility] Clinical Staff  Labs look fine. Kidney function stable. Nothing else for now.

## 2025-07-16 DIAGNOSIS — F51.01 PRIMARY INSOMNIA: ICD-10-CM

## 2025-07-17 RX ORDER — DOXEPIN HYDROCHLORIDE 25 MG/1
CAPSULE ORAL
Qty: 180 CAPSULE | Refills: 0 | Status: SHIPPED | OUTPATIENT
Start: 2025-07-17

## 2025-07-17 NOTE — TELEPHONE ENCOUNTER
Jaron Hernandez called requesting a refill on the following medications:  Requested Prescriptions     Pending Prescriptions Disp Refills    doxepin (SINEQUAN) 25 MG capsule [Pharmacy Med Name: Doxepin HCl 25 MG Oral Capsule] 180 capsule 0     Sig: TAKE 1 TO 2 CAPSULES BY MOUTH AT BEDTIME AS NEEDED FOR INSOMNIA       Date of last visit: 4/24/2025  Date of next visit (if applicable):10/24/2025  Date of last refill: 4/15/25  Pharmacy Name: Daniela Corona LPN

## 2025-07-29 ENCOUNTER — HOSPITAL ENCOUNTER (OUTPATIENT)
Age: 75
Discharge: HOME | End: 2025-07-29
Payer: MEDICARE

## 2025-07-29 ENCOUNTER — OFFICE VISIT (OUTPATIENT)
Dept: FAMILY MEDICINE CLINIC | Age: 75
End: 2025-07-29

## 2025-07-29 VITALS
TEMPERATURE: 97.6 F | BODY MASS INDEX: 26.47 KG/M2 | SYSTOLIC BLOOD PRESSURE: 138 MMHG | HEART RATE: 63 BPM | DIASTOLIC BLOOD PRESSURE: 82 MMHG | RESPIRATION RATE: 16 BRPM | OXYGEN SATURATION: 96 % | HEIGHT: 72 IN | WEIGHT: 195.4 LBS

## 2025-07-29 DIAGNOSIS — R22.1 LUMP IN NECK: Primary | ICD-10-CM

## 2025-07-29 DIAGNOSIS — R22.1 LUMP IN NECK: ICD-10-CM

## 2025-07-29 DIAGNOSIS — F41.1 GENERALIZED ANXIETY DISORDER: ICD-10-CM

## 2025-07-29 LAB
ALBUMIN SERPL BCG-MCNC: 4.2 G/DL (ref 3.4–4.9)
ALP SERPL-CCNC: 54 U/L (ref 40–129)
ALT SERPL W/O P-5'-P-CCNC: 30 U/L (ref 10–50)
ANION GAP SERPL CALC-SCNC: 12 MEQ/L (ref 8–16)
AST SERPL-CCNC: 33 U/L (ref 10–50)
BASOPHILS ABSOLUTE: 0 THOU/MM3 (ref 0–0.1)
BASOPHILS NFR BLD AUTO: 0.2 %
BILIRUB SERPL-MCNC: 0.5 MG/DL (ref 0.3–1.2)
BUN SERPL-MCNC: 29 MG/DL (ref 8–23)
CALCIUM SERPL-MCNC: 9.9 MG/DL (ref 8.8–10.2)
CHLORIDE SERPL-SCNC: 103 MEQ/L (ref 98–111)
CO2 SERPL-SCNC: 24 MEQ/L (ref 22–29)
CREAT SERPL-MCNC: 1.5 MG/DL (ref 0.7–1.2)
DEPRECATED RDW RBC AUTO: 43.7 FL (ref 35–45)
EOSINOPHIL NFR BLD AUTO: 2.3 %
EOSINOPHILS ABSOLUTE: 0.2 THOU/MM3 (ref 0–0.4)
ERYTHROCYTE [DISTWIDTH] IN BLOOD BY AUTOMATED COUNT: 12.5 % (ref 11.5–14.5)
GFR SERPL CREATININE-BSD FRML MDRD: 48 ML/MIN/1.73M2
GLUCOSE SERPL-MCNC: 133 MG/DL (ref 74–109)
HCT VFR BLD AUTO: 42.8 % (ref 42–52)
HGB BLD-MCNC: 14 GM/DL (ref 14–18)
IMM GRANULOCYTES # BLD AUTO: 0.02 THOU/MM3 (ref 0–0.07)
IMM GRANULOCYTES NFR BLD AUTO: 0.2 %
LYMPHOCYTES ABSOLUTE: 2.1 THOU/MM3 (ref 1–4.8)
LYMPHOCYTES NFR BLD AUTO: 25.1 %
MCH RBC QN AUTO: 31.1 PG (ref 26–33)
MCHC RBC AUTO-ENTMCNC: 32.7 GM/DL (ref 32.2–35.5)
MCV RBC AUTO: 95.1 FL (ref 80–94)
MONOCYTES ABSOLUTE: 0.9 THOU/MM3 (ref 0.4–1.3)
MONOCYTES NFR BLD AUTO: 10.5 %
NEUTROPHILS ABSOLUTE: 5.2 THOU/MM3 (ref 1.8–7.7)
NEUTROPHILS NFR BLD AUTO: 61.7 %
NRBC BLD AUTO-RTO: 0 /100 WBC
PLATELET # BLD AUTO: 250 THOU/MM3 (ref 130–400)
PMV BLD AUTO: 10.2 FL (ref 9.4–12.4)
POTASSIUM SERPL-SCNC: 4.1 MEQ/L (ref 3.5–5.2)
PROT SERPL-MCNC: 6.7 G/DL (ref 6.4–8.3)
RBC # BLD AUTO: 4.5 MILL/MM3 (ref 4.7–6.1)
SODIUM SERPL-SCNC: 139 MEQ/L (ref 135–145)
WBC # BLD AUTO: 8.4 THOU/MM3 (ref 4.8–10.8)

## 2025-07-29 PROCEDURE — 80053 COMPREHEN METABOLIC PANEL: CPT

## 2025-07-29 PROCEDURE — 85025 COMPLETE CBC W/AUTO DIFF WBC: CPT

## 2025-07-29 PROCEDURE — 36415 COLL VENOUS BLD VENIPUNCTURE: CPT

## 2025-07-29 RX ORDER — LORAZEPAM 0.5 MG/1
0.5 TABLET ORAL 2 TIMES DAILY
Qty: 30 TABLET | Refills: 0 | Status: SHIPPED | OUTPATIENT
Start: 2025-07-29 | End: 2025-10-27

## 2025-07-29 RX ORDER — AMMONIUM LACTATE 12 G/100G
LOTION TOPICAL
COMMUNITY
Start: 2025-04-30

## 2025-07-29 NOTE — PROGRESS NOTES
help with sleep and has been taking more frequently recently due to increased anxiety.  - Prescription Drug Monitoring Program was reviewed.  - Reassessment of medication usage will be conducted at the next visit.       Jaron \"Blu\" was seen today for mass.    Diagnoses and all orders for this visit:    Lump in neck  -     CBC with Auto Differential; Future  -     Comprehensive Metabolic Panel; Future  -     US HEAD NECK SOFT TISSUE; Future    Anxiety disorder, unspecified  -     LORazepam (ATIVAN) 0.5 MG tablet; Take 1 tablet by mouth 2 times daily for 90 days. Prn/anxiety Max Daily Amount: 1 mg        Patient given educational materials - see patient instructions.  Discussed use, benefit, and side effects of prescribed medications.  All patient questions answered.  Pt voiced understanding.  Reviewed health maintenance.       (Please note that portions of this note may have been completed with a voice recognition program.  Efforts were made to edit the dictation but occasionally words are mis-transcribed.)    The patient (or guardian, if applicable) and other individuals in attendance with the patient were advised that Artificial Intelligence will be utilized during this visit to record, process the conversation to generate a clinical note, and support improvement of the AI technology. The patient (or guardian, if applicable) and other individuals in attendance at the appointment consented to the use of AI, including the recording.                    Return if symptoms worsen or fail to improve.  Electronically signed by CAROLINE Corrigan CNP on 7/29/2025 at 9:09 AM HAILEYT

## 2025-07-30 ENCOUNTER — RESULTS FOLLOW-UP (OUTPATIENT)
Dept: FAMILY MEDICINE CLINIC | Age: 75
End: 2025-07-30

## 2025-07-31 ENCOUNTER — HOSPITAL ENCOUNTER (OUTPATIENT)
Dept: ULTRASOUND IMAGING | Age: 75
Discharge: HOME | End: 2025-07-31
Payer: MEDICARE

## 2025-07-31 DIAGNOSIS — R22.1 LUMP IN NECK: ICD-10-CM

## 2025-07-31 PROCEDURE — 76536 US EXAM OF HEAD AND NECK: CPT

## 2025-08-19 ENCOUNTER — OFFICE VISIT (OUTPATIENT)
Dept: FAMILY MEDICINE CLINIC | Age: 75
End: 2025-08-19
Payer: MEDICARE

## 2025-08-19 VITALS
BODY MASS INDEX: 26.31 KG/M2 | TEMPERATURE: 98 F | SYSTOLIC BLOOD PRESSURE: 122 MMHG | HEART RATE: 76 BPM | RESPIRATION RATE: 16 BRPM | DIASTOLIC BLOOD PRESSURE: 64 MMHG | WEIGHT: 194 LBS

## 2025-08-19 DIAGNOSIS — H66.011 NON-RECURRENT ACUTE SUPPURATIVE OTITIS MEDIA OF RIGHT EAR WITH SPONTANEOUS RUPTURE OF TYMPANIC MEMBRANE: Primary | ICD-10-CM

## 2025-08-19 PROCEDURE — 3078F DIAST BP <80 MM HG: CPT | Performed by: NURSE PRACTITIONER

## 2025-08-19 PROCEDURE — 1123F ACP DISCUSS/DSCN MKR DOCD: CPT | Performed by: NURSE PRACTITIONER

## 2025-08-19 PROCEDURE — 3017F COLORECTAL CA SCREEN DOC REV: CPT | Performed by: NURSE PRACTITIONER

## 2025-08-19 PROCEDURE — 99213 OFFICE O/P EST LOW 20 MIN: CPT | Performed by: NURSE PRACTITIONER

## 2025-08-19 PROCEDURE — 3074F SYST BP LT 130 MM HG: CPT | Performed by: NURSE PRACTITIONER

## 2025-08-19 PROCEDURE — G8427 DOCREV CUR MEDS BY ELIG CLIN: HCPCS | Performed by: NURSE PRACTITIONER

## 2025-08-19 PROCEDURE — G8417 CALC BMI ABV UP PARAM F/U: HCPCS | Performed by: NURSE PRACTITIONER

## 2025-08-19 PROCEDURE — 1036F TOBACCO NON-USER: CPT | Performed by: NURSE PRACTITIONER

## 2025-08-19 PROCEDURE — 1159F MED LIST DOCD IN RCRD: CPT | Performed by: NURSE PRACTITIONER

## 2025-08-19 RX ORDER — PREDNISONE 20 MG/1
TABLET ORAL
Qty: 15 TABLET | Refills: 0 | Status: SHIPPED | OUTPATIENT
Start: 2025-08-19 | End: 2025-08-29

## 2025-09-03 ENCOUNTER — OFFICE VISIT (OUTPATIENT)
Dept: FAMILY MEDICINE CLINIC | Age: 75
End: 2025-09-03
Payer: MEDICARE

## 2025-09-03 VITALS
RESPIRATION RATE: 14 BRPM | DIASTOLIC BLOOD PRESSURE: 84 MMHG | BODY MASS INDEX: 26.2 KG/M2 | OXYGEN SATURATION: 97 % | WEIGHT: 193.2 LBS | SYSTOLIC BLOOD PRESSURE: 128 MMHG | HEART RATE: 66 BPM

## 2025-09-03 DIAGNOSIS — H66.011 NON-RECURRENT ACUTE SUPPURATIVE OTITIS MEDIA OF RIGHT EAR WITH SPONTANEOUS RUPTURE OF TYMPANIC MEMBRANE: Primary | ICD-10-CM

## 2025-09-03 PROCEDURE — 3074F SYST BP LT 130 MM HG: CPT | Performed by: NURSE PRACTITIONER

## 2025-09-03 PROCEDURE — 1036F TOBACCO NON-USER: CPT | Performed by: NURSE PRACTITIONER

## 2025-09-03 PROCEDURE — 1123F ACP DISCUSS/DSCN MKR DOCD: CPT | Performed by: NURSE PRACTITIONER

## 2025-09-03 PROCEDURE — 1160F RVW MEDS BY RX/DR IN RCRD: CPT | Performed by: NURSE PRACTITIONER

## 2025-09-03 PROCEDURE — 3017F COLORECTAL CA SCREEN DOC REV: CPT | Performed by: NURSE PRACTITIONER

## 2025-09-03 PROCEDURE — 99213 OFFICE O/P EST LOW 20 MIN: CPT | Performed by: NURSE PRACTITIONER

## 2025-09-03 PROCEDURE — 3079F DIAST BP 80-89 MM HG: CPT | Performed by: NURSE PRACTITIONER

## 2025-09-03 PROCEDURE — G8427 DOCREV CUR MEDS BY ELIG CLIN: HCPCS | Performed by: NURSE PRACTITIONER

## 2025-09-03 PROCEDURE — 1159F MED LIST DOCD IN RCRD: CPT | Performed by: NURSE PRACTITIONER

## 2025-09-03 PROCEDURE — G8417 CALC BMI ABV UP PARAM F/U: HCPCS | Performed by: NURSE PRACTITIONER

## 2025-09-03 RX ORDER — LEVOFLOXACIN 500 MG/1
500 TABLET, FILM COATED ORAL DAILY
Qty: 7 TABLET | Refills: 0 | Status: SHIPPED | OUTPATIENT
Start: 2025-09-03 | End: 2025-09-10

## (undated) DEVICE — GLIDESHEATH SLENDER ACCESS KIT: Brand: GLIDESHEATH SLENDER

## (undated) DEVICE — SOLUTION IV 1000ML 0.9% SOD CHL PH 5 INJ USP VIAFLX PLAS

## (undated) DEVICE — BLANKET WRM W29.9XL79.1IN UP BODY FORC AIR MISTRAL-AIR

## (undated) DEVICE — SPONGE GZ W4XL4IN COT 12 PLY TYP VII WVN C FLD DSGN

## (undated) DEVICE — ADHESIVE SKIN CLSR 0.7ML TOP DERMBND ADV

## (undated) DEVICE — GLOVE ORANGE PI 7   MSG9070

## (undated) DEVICE — GLOVE SURG SZ 65 THK91MIL LTX FREE SYN POLYISOPRENE

## (undated) DEVICE — SET ADMIN 25ML L117IN PMP MOD CK VLV RLER CLMP 2 SMRTSITE

## (undated) DEVICE — Device

## (undated) DEVICE — SPONGE LAP W18XL18IN WHT COT 4 PLY FLD STRUNG RADPQ DISP ST

## (undated) DEVICE — GUIDEWIRE VASC L260CM DIA0.035IN RAD 3MM J TIP L7CM PTFE

## (undated) DEVICE — DRESSING TRNSPAR W4XL10IN FLM MIC POR SURESITE 123

## (undated) DEVICE — DRAPE KIT RAMAPR RADIATION SHIELD

## (undated) DEVICE — GARMENT,MEDLINE,DVT,INT,CALF,MED, GEN2: Brand: MEDLINE

## (undated) DEVICE — CATHETER ANGIO JL3.5 0.056 INX6 FRX100 CM THRULUMEN EXPO

## (undated) DEVICE — GOWN,SIRUS,NON REINFRCD,LARGE,SET IN SL: Brand: MEDLINE

## (undated) DEVICE — BLANKET WRM W40.2XL55.9IN IORT LO BODY + MISTRAL AIR

## (undated) DEVICE — KIT MFLD ISOLATN NACL CNTRST PRT TBNG SPIK W/ PRSS TRNSDUC

## (undated) DEVICE — WAX SURG 2.5GM HEMSTAT BNE BEESWAX PARAFFIN ISO PALMITATE

## (undated) DEVICE — BAND COMPR L24CM REG CLR PLAS HEMSTAT EXT HK AND LOOP RETEN

## (undated) DEVICE — KIT ANGIO W/ AT P65 PREM HND CTRL FOR CNTRST DEL ANGIOTOUCH

## (undated) DEVICE — CATHETER DIAG 6FR L100CM LUMN ID0.056IN JR4 CRV 0 SIDE H

## (undated) DEVICE — GLOVE ORANGE PI 8   MSG9080